# Patient Record
Sex: MALE | Race: WHITE | Employment: OTHER | URBAN - METROPOLITAN AREA
[De-identification: names, ages, dates, MRNs, and addresses within clinical notes are randomized per-mention and may not be internally consistent; named-entity substitution may affect disease eponyms.]

---

## 2017-03-04 ENCOUNTER — HOSPITAL ENCOUNTER (INPATIENT)
Age: 72
LOS: 2 days | Discharge: HOME | DRG: 282 | End: 2017-03-06
Attending: EMERGENCY MEDICINE | Admitting: INTERNAL MEDICINE
Payer: MEDICARE

## 2017-03-04 ENCOUNTER — APPOINTMENT (OUTPATIENT)
Dept: GENERAL RADIOLOGY | Age: 72
DRG: 282 | End: 2017-03-04
Attending: EMERGENCY MEDICINE
Payer: MEDICARE

## 2017-03-04 DIAGNOSIS — I21.4 NSTEMI (NON-ST ELEVATED MYOCARDIAL INFARCTION) (HCC): Primary | ICD-10-CM

## 2017-03-04 PROBLEM — I10 HTN (HYPERTENSION): Status: ACTIVE | Noted: 2017-03-04

## 2017-03-04 PROBLEM — I25.10 CAD (CORONARY ARTERY DISEASE): Status: ACTIVE | Noted: 2017-03-04

## 2017-03-04 PROBLEM — I10 HTN (HYPERTENSION): Chronic | Status: ACTIVE | Noted: 2017-03-04

## 2017-03-04 PROBLEM — I25.10 CAD (CORONARY ARTERY DISEASE): Chronic | Status: ACTIVE | Noted: 2017-03-04

## 2017-03-04 PROBLEM — I48.91 ATRIAL FIBRILLATION (HCC): Chronic | Status: ACTIVE | Noted: 2017-03-04

## 2017-03-04 LAB
ANION GAP BLD CALC-SCNC: 14 MMOL/L (ref 5–15)
BASOPHILS # BLD AUTO: 0 K/UL (ref 0–0.1)
BASOPHILS # BLD: 0 % (ref 0–1)
BUN SERPL-MCNC: 21 MG/DL (ref 6–20)
BUN/CREAT SERPL: 19 (ref 12–20)
CALCIUM SERPL-MCNC: 9.6 MG/DL (ref 8.5–10.1)
CHLORIDE SERPL-SCNC: 105 MMOL/L (ref 97–108)
CO2 SERPL-SCNC: 21 MMOL/L (ref 21–32)
CREAT SERPL-MCNC: 1.12 MG/DL (ref 0.7–1.3)
DIFFERENTIAL METHOD BLD: ABNORMAL
EOSINOPHIL # BLD: 0.3 K/UL (ref 0–0.4)
EOSINOPHIL NFR BLD: 3 % (ref 0–7)
ERYTHROCYTE [DISTWIDTH] IN BLOOD BY AUTOMATED COUNT: 13.3 % (ref 11.5–14.5)
GLUCOSE SERPL-MCNC: 95 MG/DL (ref 65–100)
HCT VFR BLD AUTO: 43.3 % (ref 36.6–50.3)
HGB BLD-MCNC: 14.5 G/DL (ref 12.1–17)
LYMPHOCYTES # BLD AUTO: 52 % (ref 12–49)
LYMPHOCYTES # BLD: 5.8 K/UL
MCH RBC QN AUTO: 31.3 PG (ref 26–34)
MCHC RBC AUTO-ENTMCNC: 33.5 G/DL (ref 30–36.5)
MCV RBC AUTO: 93.3 FL (ref 80–99)
MONOCYTES # BLD: 1.2 K/UL (ref 0–1)
MONOCYTES NFR BLD AUTO: 11 % (ref 5–13)
NEUTS SEG # BLD: 3.8 K/UL (ref 1.8–8)
NEUTS SEG NFR BLD AUTO: 34 % (ref 32–75)
PLATELET # BLD AUTO: 215 K/UL (ref 150–400)
POTASSIUM SERPL-SCNC: 3.5 MMOL/L (ref 3.5–5.1)
RBC # BLD AUTO: 4.64 M/UL (ref 4.1–5.7)
SODIUM SERPL-SCNC: 140 MMOL/L (ref 136–145)
TROPONIN I SERPL-MCNC: 0.04 NG/ML
TROPONIN I SERPL-MCNC: 0.18 NG/ML
WBC # BLD AUTO: 11.2 K/UL (ref 4.1–11.1)

## 2017-03-04 PROCEDURE — 82550 ASSAY OF CK (CPK): CPT | Performed by: INTERNAL MEDICINE

## 2017-03-04 PROCEDURE — 74011250636 HC RX REV CODE- 250/636: Performed by: EMERGENCY MEDICINE

## 2017-03-04 PROCEDURE — 71020 XR CHEST PA LAT: CPT

## 2017-03-04 PROCEDURE — 74011250637 HC RX REV CODE- 250/637: Performed by: INTERNAL MEDICINE

## 2017-03-04 PROCEDURE — B246ZZZ ULTRASONOGRAPHY OF RIGHT AND LEFT HEART: ICD-10-PCS | Performed by: INTERNAL MEDICINE

## 2017-03-04 PROCEDURE — 84484 ASSAY OF TROPONIN QUANT: CPT | Performed by: EMERGENCY MEDICINE

## 2017-03-04 PROCEDURE — 36415 COLL VENOUS BLD VENIPUNCTURE: CPT | Performed by: INTERNAL MEDICINE

## 2017-03-04 PROCEDURE — 85025 COMPLETE CBC W/AUTO DIFF WBC: CPT | Performed by: EMERGENCY MEDICINE

## 2017-03-04 PROCEDURE — 74011250636 HC RX REV CODE- 250/636: Performed by: INTERNAL MEDICINE

## 2017-03-04 PROCEDURE — 96374 THER/PROPH/DIAG INJ IV PUSH: CPT

## 2017-03-04 PROCEDURE — 93005 ELECTROCARDIOGRAM TRACING: CPT

## 2017-03-04 PROCEDURE — 65660000000 HC RM CCU STEPDOWN

## 2017-03-04 PROCEDURE — 99285 EMERGENCY DEPT VISIT HI MDM: CPT

## 2017-03-04 PROCEDURE — 80048 BASIC METABOLIC PNL TOTAL CA: CPT | Performed by: EMERGENCY MEDICINE

## 2017-03-04 PROCEDURE — 74011250637 HC RX REV CODE- 250/637: Performed by: EMERGENCY MEDICINE

## 2017-03-04 RX ORDER — METOPROLOL SUCCINATE 25 MG/1
25 TABLET, EXTENDED RELEASE ORAL 2 TIMES DAILY
Status: ON HOLD | COMMUNITY
End: 2017-03-04

## 2017-03-04 RX ORDER — BENZONATATE 100 MG/1
100 CAPSULE ORAL
Status: DISCONTINUED | OUTPATIENT
Start: 2017-03-04 | End: 2017-03-06 | Stop reason: HOSPADM

## 2017-03-04 RX ORDER — DICYCLOMINE HYDROCHLORIDE 20 MG/1
20 TABLET ORAL
COMMUNITY

## 2017-03-04 RX ORDER — PANTOPRAZOLE SODIUM 40 MG/1
40 TABLET, DELAYED RELEASE ORAL
Status: DISCONTINUED | OUTPATIENT
Start: 2017-03-05 | End: 2017-03-06 | Stop reason: HOSPADM

## 2017-03-04 RX ORDER — OXYCODONE AND ACETAMINOPHEN 5; 325 MG/1; MG/1
1 TABLET ORAL
Status: DISCONTINUED | OUTPATIENT
Start: 2017-03-04 | End: 2017-03-06 | Stop reason: HOSPADM

## 2017-03-04 RX ORDER — METOPROLOL TARTRATE 25 MG/1
25 TABLET, FILM COATED ORAL 2 TIMES DAILY
Status: DISCONTINUED | OUTPATIENT
Start: 2017-03-05 | End: 2017-03-06 | Stop reason: HOSPADM

## 2017-03-04 RX ORDER — ALPRAZOLAM 0.5 MG/1
0.5 TABLET ORAL
Status: DISCONTINUED | OUTPATIENT
Start: 2017-03-04 | End: 2017-03-06 | Stop reason: HOSPADM

## 2017-03-04 RX ORDER — ALPRAZOLAM 0.25 MG/1
.25-.5 TABLET ORAL
Status: DISCONTINUED | OUTPATIENT
Start: 2017-03-04 | End: 2017-03-04

## 2017-03-04 RX ORDER — ALPRAZOLAM 0.5 MG/1
.25-.5 TABLET ORAL
COMMUNITY
End: 2019-02-25

## 2017-03-04 RX ORDER — BENZONATATE 100 MG/1
100 CAPSULE ORAL
COMMUNITY
End: 2019-02-25

## 2017-03-04 RX ORDER — HYDROMORPHONE HYDROCHLORIDE 1 MG/ML
0.5 INJECTION, SOLUTION INTRAMUSCULAR; INTRAVENOUS; SUBCUTANEOUS
Status: DISCONTINUED | OUTPATIENT
Start: 2017-03-04 | End: 2017-03-06 | Stop reason: HOSPADM

## 2017-03-04 RX ORDER — OXYMETAZOLINE HCL 0.05 %
2 SPRAY, NON-AEROSOL (ML) NASAL
COMMUNITY
End: 2019-02-25

## 2017-03-04 RX ORDER — ACETAMINOPHEN 325 MG/1
650 TABLET ORAL
Status: DISCONTINUED | OUTPATIENT
Start: 2017-03-04 | End: 2017-03-06 | Stop reason: HOSPADM

## 2017-03-04 RX ORDER — ZOLPIDEM TARTRATE 5 MG/1
5 TABLET ORAL
Status: DISCONTINUED | OUTPATIENT
Start: 2017-03-04 | End: 2017-03-06 | Stop reason: HOSPADM

## 2017-03-04 RX ORDER — SODIUM CHLORIDE 0.9 % (FLUSH) 0.9 %
5-10 SYRINGE (ML) INJECTION EVERY 8 HOURS
Status: DISCONTINUED | OUTPATIENT
Start: 2017-03-04 | End: 2017-03-06 | Stop reason: HOSPADM

## 2017-03-04 RX ORDER — DILTIAZEM HYDROCHLORIDE 240 MG/1
240 CAPSULE, COATED, EXTENDED RELEASE ORAL DAILY
COMMUNITY
End: 2019-03-12

## 2017-03-04 RX ORDER — GUAIFENESIN 100 MG/5ML
324 LIQUID (ML) ORAL
Status: COMPLETED | OUTPATIENT
Start: 2017-03-04 | End: 2017-03-04

## 2017-03-04 RX ORDER — AZITHROMYCIN 250 MG/1
250 TABLET, FILM COATED ORAL DAILY
COMMUNITY
Start: 2017-02-28 | End: 2017-03-06

## 2017-03-04 RX ORDER — ENOXAPARIN SODIUM 100 MG/ML
1 INJECTION SUBCUTANEOUS EVERY 12 HOURS
Status: DISCONTINUED | OUTPATIENT
Start: 2017-03-05 | End: 2017-03-06 | Stop reason: HOSPADM

## 2017-03-04 RX ORDER — DILTIAZEM HYDROCHLORIDE 240 MG/1
240 CAPSULE, COATED, EXTENDED RELEASE ORAL
Status: DISCONTINUED | OUTPATIENT
Start: 2017-03-05 | End: 2017-03-06

## 2017-03-04 RX ORDER — DICYCLOMINE HYDROCHLORIDE 20 MG/1
20 TABLET ORAL
Status: DISCONTINUED | OUTPATIENT
Start: 2017-03-04 | End: 2017-03-06 | Stop reason: HOSPADM

## 2017-03-04 RX ORDER — PANTOPRAZOLE SODIUM 40 MG/1
40 TABLET, DELAYED RELEASE ORAL
COMMUNITY
End: 2019-03-09

## 2017-03-04 RX ORDER — SODIUM CHLORIDE 0.9 % (FLUSH) 0.9 %
5-10 SYRINGE (ML) INJECTION AS NEEDED
Status: DISCONTINUED | OUTPATIENT
Start: 2017-03-04 | End: 2017-03-06 | Stop reason: HOSPADM

## 2017-03-04 RX ORDER — MORPHINE SULFATE 4 MG/ML
4 INJECTION, SOLUTION INTRAMUSCULAR; INTRAVENOUS ONCE
Status: COMPLETED | OUTPATIENT
Start: 2017-03-04 | End: 2017-03-04

## 2017-03-04 RX ORDER — ALPRAZOLAM 0.25 MG/1
0.25 TABLET ORAL
Status: DISCONTINUED | OUTPATIENT
Start: 2017-03-04 | End: 2017-03-06 | Stop reason: HOSPADM

## 2017-03-04 RX ORDER — ENOXAPARIN SODIUM 100 MG/ML
1 INJECTION SUBCUTANEOUS
Status: COMPLETED | OUTPATIENT
Start: 2017-03-04 | End: 2017-03-04

## 2017-03-04 RX ORDER — LISINOPRIL 5 MG/1
10 TABLET ORAL
Status: DISCONTINUED | OUTPATIENT
Start: 2017-03-05 | End: 2017-03-06 | Stop reason: HOSPADM

## 2017-03-04 RX ORDER — METOPROLOL TARTRATE 25 MG/1
50 TABLET, FILM COATED ORAL 2 TIMES DAILY
COMMUNITY
End: 2019-03-09

## 2017-03-04 RX ORDER — SODIUM CHLORIDE 9 MG/ML
75 INJECTION, SOLUTION INTRAVENOUS CONTINUOUS
Status: DISCONTINUED | OUTPATIENT
Start: 2017-03-04 | End: 2017-03-05

## 2017-03-04 RX ORDER — LISINOPRIL 10 MG/1
10 TABLET ORAL
COMMUNITY
End: 2019-03-12

## 2017-03-04 RX ADMIN — ASPIRIN 81 MG CHEWABLE TABLET 324 MG: 81 TABLET CHEWABLE at 16:33

## 2017-03-04 RX ADMIN — DILTIAZEM HYDROCHLORIDE 240 MG: 240 CAPSULE, EXTENDED RELEASE ORAL at 23:48

## 2017-03-04 RX ADMIN — LISINOPRIL 10 MG: 5 TABLET ORAL at 23:49

## 2017-03-04 RX ADMIN — Medication 10 ML: at 22:32

## 2017-03-04 RX ADMIN — SODIUM CHLORIDE 75 ML/HR: 900 INJECTION, SOLUTION INTRAVENOUS at 22:31

## 2017-03-04 RX ADMIN — ENOXAPARIN SODIUM 80 MG: 80 INJECTION SUBCUTANEOUS at 19:49

## 2017-03-04 RX ADMIN — Medication 4 MG: at 16:33

## 2017-03-04 RX ADMIN — ALPRAZOLAM 0.5 MG: 0.5 TABLET ORAL at 23:48

## 2017-03-04 NOTE — IP AVS SNAPSHOT
Karsten Moore 
 
 
 [address] [facility] 
[phone] Patient: Viktor Tineo MRN: [id number] :1945 You are allergic to the following No active allergies Immunizations Administered for This Admission Name Date Influenza Vaccine (Quad) PF 3/6/2017 Recent Documentation Height Weight BMI Smoking Status 1.829 m 84.6 kg 25.3 kg/m2 Never Smoker Emergency Contacts Name Discharge Info Relation Home Work Mobile Chantal Christianson  Spouse [3] [phone] About your hospitalization You were admitted on:  2017 You last received care in the:  [facility] 3 INTENSIVE CARE You were discharged on:  2017 Unit phone number:  [phone] Why you were hospitalized Your primary diagnosis was:  Nstemi (Non-St Elevated Myocardial Infarction) (Hcc) Your diagnoses also included:  Htn (Hypertension), Cad (Coronary Artery Disease), Atrial Fibrillation (Hcc) Providers Seen During Your Hospitalizations Provider Role Specialty Primary office phone Alexandria Curry. Evalina Castleman, MD Attending Provider Emergency Medicine [phone] Jose L Adan MD Attending Provider Internal Medicine [phone] Nick Barber MD Attending Provider Internal Medicine [phone] Your Primary Care Physician (PCP) Primary Care Physician Office Phone Office Fax Belkis Blackburn [phone] Follow-up Information Follow up With Details Comments Contact Info Bon Bose MD Schedule an appointment as soon as possible for a visit in 1 week  [address] [place] [address] [address] 
[phone] Tito Chow MD   [address] [facility] [address] [facility] 
[phone] Current Discharge Medication List  
  
START taking these medications Dose & Instructions Dispensing Information Comments Morning Noon Evening Bedtime  
 aspirin 81 mg chewable tablet Your next dose is: Today, Tomorrow Other:  _________ Dose:  81 mg Take 1 Tab by mouth daily. Quantity:  30 Tab Refills:  0  
     
   
   
   
  
 clopidogrel 75 mg Tab Commonly known as:  PLAVIX Start taking on:  3/7/2017 Your next dose is: Today, Tomorrow Other:  _________ Dose:  75 mg Take 1 Tab by mouth daily. Quantity:  30 Tab Refills:  0 CONTINUE these medications which have NOT CHANGED Dose & Instructions Dispensing Information Comments Morning Noon Evening Bedtime AFRIN (OXYMETAZOLINE) 0.05 % nasal spray Generic drug:  oxymetazoline Your next dose is: Today, Tomorrow Other:  _________ Dose:  2 Spray 2 Sprays by Both Nostrils route daily as needed for Congestion. Refills:  0 ALPRAZolam 0.5 mg tablet Commonly known as:  Ace Sa Your next dose is: Today, Tomorrow Other:  _________ Dose:  0.25-0.5 mg Take 0.25-0.5 mg by mouth two (2) times daily as needed for Anxiety (Patient reports anxiety attacks). Refills:  0 BENTYL 20 mg tablet Generic drug:  dicyclomine Your next dose is: Today, Tomorrow Other:  _________ Dose:  20 mg Take 20 mg by mouth two (2) times daily as needed. Refills:  0 CARDIZEM  mg ER capsule Generic drug:  dilTIAZem CD Your next dose is: Today, Tomorrow Other:  _________ Dose:  240 mg Take 240 mg by mouth nightly. Refills:  0  
     
   
   
   
  
 lisinopril 10 mg tablet Commonly known as:  Ora Bee Your next dose is: Today, Tomorrow Other:  _________ Dose:  10 mg Take 10 mg by mouth nightly. Refills:  0 metoprolol tartrate 25 mg tablet Commonly known as:  LOPRESSOR Your next dose is: Today, Tomorrow Other:  _________ Dose:  25 mg Take 25 mg by mouth two (2) times a day. Refills:  0  
     
   
   
   
  
 pantoprazole 40 mg tablet Commonly known as:  PROTONIX Your next dose is: Today, Tomorrow Other:  _________ Dose:  40 mg Take 40 mg by mouth Daily (before breakfast). Indications: GASTROESOPHAGEAL REFLUX Refills:  0  
     
   
   
   
  
 TESSALON PERLES 100 mg capsule Generic drug:  benzonatate Your next dose is: Today, Tomorrow Other:  _________ Dose:  100 mg Take 100 mg by mouth three (3) times daily as needed for Cough. Refills:  0 VICKS DAYQUIL COLD-FLU RELIEF 5- mg/15 mL Liqd Generic drug:  Phenylephrine-DM-Acetaminophen Your next dose is: Today, Tomorrow Other:  _________ Dose:  15 mL Take 15 mL by mouth daily as needed. Refills:  0 VICKS NYQUIL COUGH 6.25-15 mg/15 mL Soln Generic drug:  doxylamine-dm Your next dose is: Today, Tomorrow Other:  _________ Dose:  15 mL Take 15 mL by mouth nightly as needed. Refills:  0 STOP taking these medications   
 azithromycin 250 mg tablet Commonly known as:  Mary Grace Collins Where to Get Your Medications Information on where to get these meds will be given to you by the nurse or doctor. ! Ask your nurse or doctor about these medications  
  aspirin 81 mg chewable tablet  
 clopidogrel 75 mg Tab Discharge Instructions Jeanna Quigley Office: [address] 
    [phone] [facility] office: [address] [place] [address]  
                           [phone] Patient Discharge Instructions Trenton Wang / [id number] : 1945 Admitted 3/4/2017 Discharged: 3/6/2017 Cardiologist:  Dr. Vida Resendiz     PCP: Eleanor Hicks MD 
   
Procedures/Test:CATH: no coronary artery disease · It is important that you take the medication exactly as they are prescribed. · Keep your medication in the bottles provided by the pharmacist and keep a list of the medication names, dosages, and times to be taken in your wallet. · Do not take other medications without consulting your doctor. BRING ALL of YOUR MEDICINES TO YOUR OFFICE VISIT with Dr.  
 
Cardiac Catheterization  Discharge Instructions *Check the puncture site frequently for swelling or bleeding. If you see any bleeding, lie down and apply pressure over the area with a clean town or washcloth. Notify your doctor for any redness, swelling, drainage or oozing from the puncture site. Notify your doctor for any fever or chills. *If the leg or arm with the puncture becomes cold, numb or painful, call Dr Nydia Merino *Activity should be limited for the next 48 hours. Climb stairs as little as possible and avoid any stooping, bending or strenuous activity for 48 hours. No strenuous activity or heavy lifting over 10 lbs. for 7 days. · You may take a shower 24 hours after your cardiac catheterization. Be sure to get the dressing wet and then remove it; gently wash the area with warm soapy water. Pat dry and leave open to air. To help prevent infections, be sure to keep the cath site clean and dry. No lotions, creams, powders, ointments, etc. in the cath site for approximately 1 week. · Do not take a tub bath, get in a hot tub or swimming pool for approximately 5 days or until the cath site is completely healed. · Drink plenty of fluids for 24-48 hours after your cath to flush the contrast dye from your kidneys. No alcoholic beverages for 24 hours. You may resume your previous diet (low fat, low cholesterol) after your cath. · Do not drive or operate heavy machinery for 48 hours. · You may resume your usual diet. Drink more fluids than usual. 
· Have a responsible person drive you home and stay with you for at least 24 hours after your heart catheterization/angiography. · *After your cath, some bruising or discomfort is common during the healing process. Tylenol, 1-2 tablets every 6 hours as needed, is recommended if you experience any discomfort. If you experience any signs or symptoms of infection such as fever, chills, or poorly healing incision, persistent tenderness or swelling in the groin, redness and/or warmth to the touch, numbness, significant tingling or pain at the groin site or affected extremity, rash, drainage from the cath site, or if the leg feels tight or swollen, call your physician right away. ? If bleeding at the cath site occurs, take a clean gauze pad and apply direct pressure to the groin just above the puncture site. Call 911 immediately, and continue to apply direct pressure until an ambulance gets to your location. ? You may return to work  2  days after your cardiac cath if no groin bleeding. Activity: Resume normal activity letting fatigue be the guide. Do not lift over 10 pounds for 7 days. Diet: American Heart association, low fat, 1500 mg sodium  Diabetic. Call for or return to ER for recurrent or prolonged chest pain, shortness of breath, lightheadedness, dizzy, palpitations, passing out, swelling in the legs or abdomen, pain or swelling  At the cath site. Lifestyle changes Eat a heart-healthy diet that is low in cholesterol, saturated fat, and salt, and is full of fruits, vegetables and whole-grains. Eat at least two servings of fish each week.  You may get more details about how to eat healthy, but these tips can help you get started. Www.[facility].com When should you call for help? Call 911 anytime you think you may need emergency care. For example, call if: 
You have signs of a heart attack. These may include: 
Chest pain or pressure. Sweating. Shortness of breath. Nausea or vomiting. Pain that spreads from the chest to the neck, jaw, or one or both shoulders or arms. Dizziness or lightheadedness. A fast or irregular pulse. After calling 911, chew 1 adult-strength aspirin. Wait for an ambulance. Do not try to drive yourself. You passed out (lost consciousness). You feel like you are having another heart attack. Call your doctor now or seek immediate medical care if: 
You have had any chest pain, even if it has gone away. You have new or increased shortness of breath. You are dizzy or lightheaded, or you feel like you may faint. Watch closely for changes in your health, and be sure to contact your doctor if you have any problem Information obtained by : 
I understand that if any problems occur once I am at home I am to contact my physician. I understand and acknowledge receipt of the instructions indicated above. R.N.'s Signature                                                                  Date/Time Patient or Representative Signature                                                          Date/Time HOSPITALIST DISCHARGE INSTRUCTIONS 
NAME: Reggie Holter :  1945 MRN:  [id number] Date/Time:  3/6/2017 2:52 PM 
 
ADMIT DATE: 3/4/2017 DISCHARGE DATE: 3/6/2017 ADMITTING DIAGNOSIS: 
Elevated troponin Possibly small vessel disease v. Viral myocarditis DISCHARGE DIAGNOSIS: 
As above MEDICATIONS: 
  
· It is important that you take the medication exactly as they are prescribed. · Keep your medication in the bottles provided by the pharmacist and keep a list of the medication names, dosages, and times to be taken in your wallet. · Do not take other medications without consulting your doctor. Pain Management: per above medications What to do at [facility] Recommended diet:  Cardiac Diet Recommended activity: Activity as tolerated If you experience any of the following symptoms then please call your primary care physician or return to the emergency room if you cannot get hold of your doctor: 
Fever, chills, nausea, vomiting, diarrhea, change in mentation, falling, bleeding, shortness of breath, chest pain Follow Up: 
Dr. Inga Horne MD  you are to call and set up an appointment to see them in 2 weeks. Follow-up Information Follow up With Details Comments Contact Info Dawood Scott MD Schedule an appointment as soon as possible for a visit in 1 week  [address] [address] [place] [address] 
[phone] Inga Horne MD   [address] [facility] [address] [facility] 
[phone] Information obtained by : 
I understand that if any problems occur once I am at home I am to contact my physician. I understand and acknowledge receipt of the instructions indicated above. Physician's or R.N.'s Signature                                                                  Date/Time Patient or Representative Signature                                                          Date/Time Discharge Orders None [employer] Announcement We are excited to announce that we are making your provider's discharge notes available to you in [employer]. You will see these notes when they are completed and signed by the physician that discharged you from your recent hospital stay. If you have any questions or concerns about any information you see in [employer], please call the Health Information Department where you were seen or reach out to your Primary Care Provider for more information about your plan of care. Introducing \[facility] [place] [facility]\"" & HEALTH SERVICES! Carmen Ford introduces [employer] patient portal. Now you can access parts of your medical record, email your doctor's office, and request medication refills online. 1. In your internet browser, go to [email or web address] 2. Click on the First Time User? Click Here link in the Sign In box. You will see the New Member Sign Up page. 3. Enter your [employer] Access Code exactly as it appears below. You will not need to use this code after youve completed the sign-up process. If you do not sign up before the expiration date, you must request a new code. · [employer] Access Code: [email or web address] Expires: 6/2/2017  3:10 PM 
 
4. Enter the last four digits of your Social Security Number (xxxx) and Date of Birth (mm/dd/yyyy) as indicated and click Submit. You will be taken to the next sign-up page. 5. Create a [employer] ID. This will be your [employer] login ID and cannot be changed, so think of one that is secure and easy to remember. 6. Create a [employer] password. You can change your password at any time. 7. Enter your Password Reset Question and Answer. This can be used at a later time if you forget your password. 8. Enter your e-mail address. You will receive e-mail notification when new information is available in [address] 9. Click Sign Up. You can now view and download portions of your medical record.  
10. Click the Download Summary menu link to download a portable copy of your medical information. If you have questions, please visit the Frequently Asked Questions section of the [employer] website. Remember, MyChart is NOT to be used for urgent needs. For medical emergencies, dial 911. Now available from your iPhone and Android! General Information Please provide this summary of care documentation to your next provider. Patient Signature:  ____________________________________________________________ Date:  ____________________________________________________________  
  
Elenita Moulds Provider Signature:  ____________________________________________________________ Date:  ____________________________________________________________

## 2017-03-04 NOTE — ED PROVIDER NOTES
HPI Comments: 80 y.o. male with past medical history significant for HTN, CAD, A-fib who presents from home with chief complaint of chest pain. Describes chest pain as right sided and as a pressure. Denies SOB or nausea. Started while walking his dog. Has been sick recently with nasal congestion and a cough for 2 weeks. There are no other acute medical concerns at this time. Social hx:   Significant FMHx: none  PCP: Uzma Monet MD           Past Medical History:   Diagnosis Date    [facility]     Acid reflux     CAD (coronary artery disease)     Hypertension        Past Surgical History:   Procedure Laterality Date    HX AORTIC VALVE REPLACEMENT           History reviewed. No pertinent family history. Social History     Social History    Marital status:      Spouse name: N/A    Number of children: N/A    Years of education: N/A     Occupational History    Not on file. Social History Main Topics    Smoking status: Never Smoker    Smokeless tobacco: Never Used    Alcohol use 4.0 oz/week     8 Cans of beer per week      Comment: every other night    Drug use: No    Sexual activity: Not on file     Other Topics Concern    Not on file     Social History Narrative         ALLERGIES: Review of patient's allergies indicates no known allergies. Review of Systems   Constitutional: Negative for chills and fever. HENT: Positive for postnasal drip. Negative for ear pain and sore throat. Eyes: Negative for pain. Respiratory: Positive for cough. Negative for chest tightness and shortness of breath. Cardiovascular: Positive for chest pain. Negative for leg swelling. Gastrointestinal: Negative for abdominal pain, nausea and vomiting. Genitourinary: Negative for dysuria and flank pain. Musculoskeletal: Negative for back pain. Skin: Negative for rash. Neurological: Negative for headaches. All other systems reviewed and are negative.       Vitals:    03/04/17 1700 03/04/17 1720 03/04/17 1730 03/04/17 1740   BP: 132/80 143/90 141/78 128/81   Pulse: (!) 53 (!) 56  (!) 54   Resp: 18 11 17   Temp:       SpO2: 93% 96%  97%   Weight:       Height:                Physical Exam   Constitutional: He appears well-developed and well-nourished. No distress. HENT:   Head: Normocephalic and atraumatic. Eyes: Pupils are equal, round, and reactive to light. No scleral icterus. Neck: Normal range of motion. Neck supple. No tracheal deviation present. Cardiovascular: Normal rate, regular rhythm, normal heart sounds and intact distal pulses. Exam reveals no gallop and no friction rub. No murmur heard. Pulmonary/Chest: Effort normal and breath sounds normal. No respiratory distress. He has no wheezes. He has no rales. Abdominal: Soft. He exhibits no distension. There is no tenderness. There is no rebound and no guarding. Musculoskeletal: He exhibits no edema. Neurological: He is alert. Skin: Skin is warm and dry. Psychiatric: He has a normal mood and affect. Nursing note and vitals reviewed. MDM  ED Course       Procedures    The patient presents with chest pain with a differential diagnosis of  ACS, acute MI, chest wall pain and GERD    ED Course:  Pain greatly improved with ASA and morphine. 6:22 PM  Spoke with Dr. Mitchel Rubio.  Agreed with plan. Patient can follow up with Dr. Ashia Cabral on Monday in mid morning. EKG Per My Interpretation:  Rhythm: normal sinus rhythm;  Rate (approx.): 70; Axis: left axis deviation; QRS interval: prolonged; ST/T wave: non-specific changes; Other findings: left ventricular hypertrophy.      LABORATORY TESTS:  Recent Results (from the past 24 hour(s))   EKG, 12 LEAD, INITIAL    Collection Time: 03/04/17  3:13 PM   Result Value Ref Range    Ventricular Rate 70 BPM    Atrial Rate 70 BPM    P-R Interval 206 ms    QRS Duration 128 ms    Q-T Interval 446 ms    QTC Calculation (Bezet) 481 ms    Calculated P Axis 61 degrees    Calculated R Axis -52 degrees    Calculated T Axis 82 degrees    Diagnosis       Sinus rhythm with occasional and consecutive premature ventricular complexes   and fusion complexes  Left axis deviation  Left ventricular hypertrophy with QRS widening and repolarization abnormality  Abnormal ECG  No previous ECGs available     CBC WITH AUTOMATED DIFF    Collection Time: 03/04/17  3:25 PM   Result Value Ref Range    WBC 11.2 (H) 4.1 - 11.1 K/uL    RBC 4.64 4.10 - 5.70 M/uL    HGB 14.5 12.1 - 17.0 g/dL    HCT 43.3 36.6 - 50.3 %    MCV 93.3 80.0 - 99.0 FL    MCH 31.3 26.0 - 34.0 PG    MCHC 33.5 30.0 - 36.5 g/dL    RDW 13.3 11.5 - 14.5 %    PLATELET 440 596 - 178 K/uL    NEUTROPHILS 34 32 - 75 %    LYMPHOCYTES 52 (H) 12 - 49 %    MONOCYTES 11 5 - 13 %    EOSINOPHILS 3 0 - 7 %    BASOPHILS 0 0 - 1 %    ABS. NEUTROPHILS 3.8 1.8 - 8.0 K/UL    ABS. LYMPHOCYTES 5.8 K/UL    ABS. MONOCYTES 1.2 (H) 0.0 - 1.0 K/UL    ABS. EOSINOPHILS 0.3 0.0 - 0.4 K/UL    ABS. BASOPHILS 0.0 0.0 - 0.1 K/UL    DF AUTOMATED     TROPONIN I    Collection Time: 03/04/17  3:25 PM   Result Value Ref Range    Troponin-I, Qt. 0.04 <9.31 ng/mL   METABOLIC PANEL, BASIC    Collection Time: 03/04/17  3:25 PM   Result Value Ref Range    Sodium 140 136 - 145 mmol/L    Potassium 3.5 3.5 - 5.1 mmol/L    Chloride 105 97 - 108 mmol/L    CO2 21 21 - 32 mmol/L    Anion gap 14 5 - 15 mmol/L    Glucose 95 65 - 100 mg/dL    BUN 21 (H) 6 - 20 MG/DL    Creatinine 1.12 0.70 - 1.30 MG/DL    BUN/Creatinine ratio 19 12 - 20      GFR est AA >60 >60 ml/min/1.73m2    GFR est non-AA >60 >60 ml/min/1.73m2    Calcium 9.6 8.5 - 10.1 MG/DL       IMAGING RESULTS:  Xr Chest Pa Lat    Result Date: 3/4/2017  CLINICAL HISTORY: Chest pain , cough as well, congestion for 2 weeks INDICATION: Chest pain COMPARISON: None FINDINGS: PA and lateral views of the chest are obtained. The cardiopericardial silhouette is within normal limits. There is no pleural effusion, pneumothorax or focal consolidation present. Sternotomy. IMPRESSION: No acute intrathoracic disease.        MEDICATIONS GIVEN:  Medications   aspirin chewable tablet 324 mg (324 mg Oral Given 3/4/17 1633)   morphine injection 4 mg (4 mg IntraVENous Given 3/4/17 1633)       IMPRESSION:  1. NSTEMI (non-ST elevated myocardial infarction) ([facility]        PLAN:  -  Lovenox    Disposition:  Admit     Condition: stable    Total critical care time spend exclusive of procedures:  33 minutes    Perfecto Mensah MD

## 2017-03-04 NOTE — IP AVS SNAPSHOT
Current Discharge Medication List  
  
Take these medications at their scheduled times Dose & Instructions Dispensing Information Comments Morning Noon Evening Bedtime  
 aspirin 81 mg chewable tablet Your next dose is: Today, Tomorrow Other:  ____________ Dose:  81 mg Take 1 Tab by mouth daily. Quantity:  30 Tab Refills:  0 CARDIZEM  mg ER capsule Generic drug:  dilTIAZem CD Your next dose is: Today, Tomorrow Other:  ____________ Dose:  240 mg Take 240 mg by mouth nightly. Refills:  0  
     
   
   
   
  
 clopidogrel 75 mg Tab Commonly known as:  PLAVIX Start taking on:  3/7/2017 Your next dose is: Today, Tomorrow Other:  ____________ Dose:  75 mg Take 1 Tab by mouth daily. Quantity:  30 Tab Refills:  0  
     
   
   
   
  
 lisinopril 10 mg tablet Commonly known as:  Magda Loss Your next dose is: Today, Tomorrow Other:  ____________ Dose:  10 mg Take 10 mg by mouth nightly. Refills:  0  
     
   
   
   
  
 metoprolol tartrate 25 mg tablet Commonly known as:  LOPRESSOR Your next dose is: Today, Tomorrow Other:  ____________ Dose:  25 mg Take 25 mg by mouth two (2) times a day. Refills:  0  
     
   
   
   
  
 pantoprazole 40 mg tablet Commonly known as:  PROTONIX Your next dose is: Today, Tomorrow Other:  ____________ Dose:  40 mg Take 40 mg by mouth Daily (before breakfast). Indications: GASTROESOPHAGEAL REFLUX Refills:  0 Take these medications as needed Dose & Instructions Dispensing Information Comments Morning Noon Evening Bedtime AFRIN (OXYMETAZOLINE) 0.05 % nasal spray Generic drug:  oxymetazoline Your next dose is: Today, Tomorrow Other:  ____________ Dose:  2 Spray 2 Sprays by Both Nostrils route daily as needed for Congestion. Refills:  0 ALPRAZolam 0.5 mg tablet Commonly known as:  Pam [place]in Your next dose is: Today, Tomorrow Other:  ____________ Dose:  0.25-0.5 mg Take 0.25-0.5 mg by mouth two (2) times daily as needed for Anxiety (Patient reports anxiety attacks). Refills:  0 BENTYL 20 mg tablet Generic drug:  dicyclomine Your next dose is: Today, Tomorrow Other:  ____________ Dose:  20 mg Take 20 mg by mouth two (2) times daily as needed. Refills:  0  
     
   
   
   
  
 TESSALON PERLES 100 mg capsule Generic drug:  benzonatate Your next dose is: Today, Tomorrow Other:  ____________ Dose:  100 mg Take 100 mg by mouth three (3) times daily as needed for Cough. Refills:  0 VICKS DAYQUIL COLD-FLU RELIEF 5- mg/15 mL Liqd Generic drug:  Phenylephrine-DM-Acetaminophen Your next dose is: Today, Tomorrow Other:  ____________ Dose:  15 mL Take 15 mL by mouth daily as needed. Refills:  0 VICKS NYQUIL COUGH 6.25-15 mg/15 mL Soln Generic drug:  doxylamine-dm Your next dose is: Today, Tomorrow Other:  ____________ Dose:  15 mL Take 15 mL by mouth nightly as needed. Refills:  0 Where to Get Your Medications Information about where to get these medications is not yet available ! Ask your nurse or doctor about these medications  
  aspirin 81 mg chewable tablet  
 clopidogrel 75 mg Tab

## 2017-03-04 NOTE — ED NOTES
Based on TRST score of 1, functional assessment discussed in collaboration with Provider  Dr Kindra Ibrahim , Family member/representative  spouse , and/or Patient and no additional referrals needed at this time.

## 2017-03-04 NOTE — ED TRIAGE NOTES
Pt assisted to treatment area via wheelchair he states that about 20 minutes ago while walking the dog he began with a pressure to his mid chest but he notes more to the right mid chest.  The pressure continues at this time, pt denies any N/V, SOB or diaphoresis with the pressure. Pt notes that for the past week he has been dealing with a chest cold with a  productive cough of yellowish mucous. He just finished a Zpack for this chest cold, and he is not feeling much better.

## 2017-03-05 LAB
ANION GAP BLD CALC-SCNC: 9 MMOL/L (ref 5–15)
ATRIAL RATE: 70 BPM
BUN SERPL-MCNC: 17 MG/DL (ref 6–20)
BUN/CREAT SERPL: 21 (ref 12–20)
CALCIUM SERPL-MCNC: 8.5 MG/DL (ref 8.5–10.1)
CALCULATED P AXIS, ECG09: 61 DEGREES
CALCULATED R AXIS, ECG10: -52 DEGREES
CALCULATED T AXIS, ECG11: 82 DEGREES
CHLORIDE SERPL-SCNC: 109 MMOL/L (ref 97–108)
CHOLEST SERPL-MCNC: 153 MG/DL
CK SERPL-CCNC: 232 U/L (ref 39–308)
CK SERPL-CCNC: 421 U/L (ref 39–308)
CK SERPL-CCNC: 439 U/L (ref 39–308)
CO2 SERPL-SCNC: 24 MMOL/L (ref 21–32)
CREAT SERPL-MCNC: 0.82 MG/DL (ref 0.7–1.3)
DIAGNOSIS, 93000: NORMAL
ERYTHROCYTE [DISTWIDTH] IN BLOOD BY AUTOMATED COUNT: 13.5 % (ref 11.5–14.5)
GLUCOSE SERPL-MCNC: 98 MG/DL (ref 65–100)
HCT VFR BLD AUTO: 41 % (ref 36.6–50.3)
HDLC SERPL-MCNC: 64 MG/DL
HDLC SERPL: 2.4 {RATIO} (ref 0–5)
HGB BLD-MCNC: 13 G/DL (ref 12.1–17)
LDLC SERPL CALC-MCNC: 75.8 MG/DL (ref 0–100)
LIPID PROFILE,FLP: NORMAL
MAGNESIUM SERPL-MCNC: 2 MG/DL (ref 1.6–2.4)
MCH RBC QN AUTO: 30.2 PG (ref 26–34)
MCHC RBC AUTO-ENTMCNC: 31.7 G/DL (ref 30–36.5)
MCV RBC AUTO: 95.1 FL (ref 80–99)
P-R INTERVAL, ECG05: 206 MS
PHOSPHATE SERPL-MCNC: 4 MG/DL (ref 2.6–4.7)
PLATELET # BLD AUTO: 174 K/UL (ref 150–400)
POTASSIUM SERPL-SCNC: 4.5 MMOL/L (ref 3.5–5.1)
Q-T INTERVAL, ECG07: 446 MS
QRS DURATION, ECG06: 128 MS
QTC CALCULATION (BEZET), ECG08: 481 MS
RBC # BLD AUTO: 4.31 M/UL (ref 4.1–5.7)
SODIUM SERPL-SCNC: 142 MMOL/L (ref 136–145)
TRIGL SERPL-MCNC: 66 MG/DL (ref ?–150)
TROPONIN I SERPL-MCNC: 15.13 NG/ML
TROPONIN I SERPL-MCNC: 4.07 NG/ML
TROPONIN I SERPL-MCNC: 9.64 NG/ML
VENTRICULAR RATE, ECG03: 70 BPM
VLDLC SERPL CALC-MCNC: 13.2 MG/DL
WBC # BLD AUTO: 6.4 K/UL (ref 4.1–11.1)

## 2017-03-05 PROCEDURE — 83735 ASSAY OF MAGNESIUM: CPT | Performed by: INTERNAL MEDICINE

## 2017-03-05 PROCEDURE — 65660000000 HC RM CCU STEPDOWN

## 2017-03-05 PROCEDURE — 74011250636 HC RX REV CODE- 250/636: Performed by: INTERNAL MEDICINE

## 2017-03-05 PROCEDURE — 93306 TTE W/DOPPLER COMPLETE: CPT

## 2017-03-05 PROCEDURE — 85027 COMPLETE CBC AUTOMATED: CPT | Performed by: INTERNAL MEDICINE

## 2017-03-05 PROCEDURE — 82550 ASSAY OF CK (CPK): CPT | Performed by: INTERNAL MEDICINE

## 2017-03-05 PROCEDURE — 80061 LIPID PANEL: CPT | Performed by: INTERNAL MEDICINE

## 2017-03-05 PROCEDURE — 77030033269 HC SLV COMPR SCD KNE2 CARD -B

## 2017-03-05 PROCEDURE — 84484 ASSAY OF TROPONIN QUANT: CPT | Performed by: INTERNAL MEDICINE

## 2017-03-05 PROCEDURE — 80048 BASIC METABOLIC PNL TOTAL CA: CPT | Performed by: INTERNAL MEDICINE

## 2017-03-05 PROCEDURE — 84100 ASSAY OF PHOSPHORUS: CPT | Performed by: INTERNAL MEDICINE

## 2017-03-05 PROCEDURE — 74011250637 HC RX REV CODE- 250/637: Performed by: INTERNAL MEDICINE

## 2017-03-05 PROCEDURE — 36415 COLL VENOUS BLD VENIPUNCTURE: CPT | Performed by: INTERNAL MEDICINE

## 2017-03-05 RX ORDER — FLUTICASONE PROPIONATE 50 MCG
2 SPRAY, SUSPENSION (ML) NASAL DAILY
Status: DISCONTINUED | OUTPATIENT
Start: 2017-03-05 | End: 2017-03-06 | Stop reason: HOSPADM

## 2017-03-05 RX ORDER — GUAIFENESIN 100 MG/5ML
81 LIQUID (ML) ORAL DAILY
Status: DISCONTINUED | OUTPATIENT
Start: 2017-03-05 | End: 2017-03-06 | Stop reason: HOSPADM

## 2017-03-05 RX ORDER — GUAIFENESIN 100 MG/5ML
81 LIQUID (ML) ORAL
Status: DISCONTINUED | OUTPATIENT
Start: 2017-03-05 | End: 2017-03-05 | Stop reason: ALTCHOICE

## 2017-03-05 RX ORDER — SODIUM CHLORIDE 9 MG/ML
75 INJECTION, SOLUTION INTRAVENOUS CONTINUOUS
Status: DISCONTINUED | OUTPATIENT
Start: 2017-03-06 | End: 2017-03-06 | Stop reason: HOSPADM

## 2017-03-05 RX ADMIN — PANTOPRAZOLE SODIUM 40 MG: 40 TABLET, DELAYED RELEASE ORAL at 08:44

## 2017-03-05 RX ADMIN — ENOXAPARIN SODIUM 80 MG: 80 INJECTION SUBCUTANEOUS at 21:29

## 2017-03-05 RX ADMIN — Medication 10 ML: at 06:15

## 2017-03-05 RX ADMIN — DILTIAZEM HYDROCHLORIDE 240 MG: 240 CAPSULE, EXTENDED RELEASE ORAL at 21:33

## 2017-03-05 RX ADMIN — METOPROLOL TARTRATE 25 MG: 25 TABLET ORAL at 21:33

## 2017-03-05 RX ADMIN — FLUTICASONE PROPIONATE 2 SPRAY: 50 SPRAY, METERED NASAL at 13:30

## 2017-03-05 RX ADMIN — SODIUM CHLORIDE 50 ML/HR: 900 INJECTION, SOLUTION INTRAVENOUS at 23:36

## 2017-03-05 RX ADMIN — METOPROLOL TARTRATE 25 MG: 25 TABLET ORAL at 08:44

## 2017-03-05 RX ADMIN — ENOXAPARIN SODIUM 80 MG: 80 INJECTION SUBCUTANEOUS at 08:44

## 2017-03-05 RX ADMIN — ASPIRIN 81 MG CHEWABLE TABLET 81 MG: 81 TABLET CHEWABLE at 08:44

## 2017-03-05 RX ADMIN — Medication 10 ML: at 21:34

## 2017-03-05 RX ADMIN — LISINOPRIL 10 MG: 5 TABLET ORAL at 21:33

## 2017-03-05 RX ADMIN — ALPRAZOLAM 0.5 MG: 0.5 TABLET ORAL at 21:33

## 2017-03-05 NOTE — ED NOTES
Bedside verbal report given to [facility] with [facility]. Time out completed.   Verification of hospital location [facility] and room IVCU 3007 completed with EMS provider

## 2017-03-05 NOTE — PROGRESS NOTES
[facility] [address], [place], [address] [facility]  [phone]      Medical Progress Note      NAME: Yusuf Orr   :  1945  MRM:  [id number]    Date/Time: 3/5/2017  1:13 PM         Subjective:     Chief Complaint: \"I'm okay\"   Pt without complaints of chest pain or SOB. Trop has peaked at 13. Plan for cath it the AM     ROS:  (bold if positive, if negative)      Tolerating Diet          Objective:       Vitals:          Last 24hrs VS reviewed since prior progress note.  Most recent are:    Visit Vitals    /78    Pulse (!) 55    Temp 97.8 °F (36.6 °C)    Resp 13    Ht 6' (1.829 m)    Wt 84.6 kg (186 lb 8.2 oz)    SpO2 100%    BMI 25.3 kg/m2     SpO2 Readings from Last 6 Encounters:   17 100%   14 98%          Intake/Output Summary (Last 24 hours) at 17 1313  Last data filed at 17 1000   Gross per 24 hour   Intake          1101.25 ml   Output             1820 ml   Net          -718.75 ml          Exam:     Physical Exam:    Gen:  Well-developed, well-nourished, in no acute distress  HEENT:  Pink conjunctivae, PERRL, hearing intact to voice, moist mucous membranes  Neck:  Supple, without masses, thyroid non-tender  Resp:  No accessory muscle use, clear breath sounds without wheezes rales or rhonchi  Card:  No murmurs, normal S1, S2 without thrills, bruits or peripheral edema  Abd:  Soft, non-tender, non-distended, normoactive bowel sounds are present  Musc:  No cyanosis or clubbing  Skin:  No rashes or ulcers, skin turgor is good  Neuro:  Cranial nerves 3-12 are grossly intact,  strength is 5/5 bilaterally and dorsi / plantarflexion is 5/5 bilaterally, follows commands appropriately  Psych:  Good insight, oriented to person, place and time, alert    Medications Reviewed: (see below)    Lab Data Reviewed: (see below)    ______________________________________________________________________    Medications:     Current Facility-Administered Medications   Medication Dose Route Frequency    aspirin chewable tablet 81 mg  81 mg Oral DAILY    fluticasone (FLONASE) 50 mcg/actuation nasal spray 2 Spray  2 Spray Both Nostrils DAILY    [START ON 3/6/2017] 0.9% sodium chloride infusion  50 mL/hr IntraVENous CONTINUOUS    enoxaparin (LOVENOX) injection 80 mg  1 mg/kg SubCUTAneous Q12H    sodium chloride (NS) flush 5-10 mL  5-10 mL IntraVENous Q8H    sodium chloride (NS) flush 5-10 mL  5-10 mL IntraVENous PRN    acetaminophen (TYLENOL) tablet 650 mg  650 mg Oral Q4H PRN    oxyCODONE-acetaminophen (PERCOCET) 5-325 mg per tablet 1 Tab  1 Tab Oral Q4H PRN    HYDROmorphone (PF) (DILAUDID) injection 0.5 mg  0.5 mg IntraVENous Q4H PRN    zolpidem (AMBIEN) tablet 5 mg  5 mg Oral QHS PRN    prochlorperazine (COMPAZINE) with saline injection 5 mg  5 mg IntraVENous Q6H PRN    influenza vaccine 2016-17 (36mos+)(PF) (FLUZONE/FLUARIX/FLULAVAL QUAD) injection 0.5 mL  0.5 mL IntraMUSCular PRIOR TO DISCHARGE    benzonatate (TESSALON) capsule 100 mg  100 mg Oral TID PRN    dicyclomine (BENTYL) tablet 20 mg  20 mg Oral BID PRN    dilTIAZem CD (CARDIZEM CD) capsule 240 mg  240 mg Oral QHS    lisinopril (PRINIVIL, ZESTRIL) tablet 10 mg  10 mg Oral QHS    metoprolol tartrate (LOPRESSOR) tablet 25 mg  25 mg Oral BID    pantoprazole (PROTONIX) tablet 40 mg  40 mg Oral ACB    ALPRAZolam (XANAX) tablet 0.25 mg  0.25 mg Oral BID PRN    Or    ALPRAZolam (XANAX) tablet 0.5 mg  0.5 mg Oral BID PRN            Lab Review:     Recent Labs      03/05/17 0527 03/04/17   1525   WBC  6.4  11.2*   HGB  13.0  14.5   HCT  41.0  43.3   PLT  174  215     Recent Labs      03/05/17 0527  03/04/17   1525   NA  142  140   K  4.5  3.5   CL  109*  105   CO2  24  21   GLU  98  95   BUN  17  21*   CREA  0.82  1.12   CA  8.5  9.6   MG  2.0   --    PHOS  4.0   --      No components found for: [place]         Assessment / Plan:   NSTEMI (non-ST elevated myocardial infarction) ([facility] (3/4/2017)  -continue ASA   -morphine PRN   -continue O2  -nitrates PRN   -continue therapeutic lovenox   -LDL roughly at goal; 75  -plan for cath in the AM; NPO at midnight       HTN (hypertension) (3/4/2017)  -continue metoprolol, dilt, ACE I       CAD (coronary artery disease) (3/4/2017)  -continue ASA, beta blocker   -not on statin; defer to cardiology re: statin as LDL 75      Atrial fibrillation ([facility] (3/4/2017)   -continue dilt and metoprolol  -not on NOAC; defer to cardiology about this as CHADS2 score =>2     Total time spent with patient: 28 Minutes                  Care Plan discussed with: Patient and Family    Discussed:  Code Status, Care Plan and D/C Planning    Prophylaxis:  Lovenox    Disposition:  Home w/Family           ___________________________________________________    Attending Physician: Per Stone MD

## 2017-03-05 NOTE — ED NOTES
TRANSFER - OUT REPORT:    Verbal report given to Elliot Carrero RN (name) on Jennifer Esteban  being transferred to [facility] IVCU/ ICU 3007(unit) for routine progression of care       Report consisted of patients Situation, Background, Assessment and   Recommendations(SBAR). Information from the following report(s) SBAR, Kardex, ED Summary, Intake/Output and MAR was reviewed with the receiving nurse. Lines:   Peripheral IV 03/04/17 Right Antecubital (Active)   Site Assessment Clean, dry, & intact 3/4/2017  3:20 PM   Phlebitis Assessment 0 3/4/2017  3:20 PM   Infiltration Assessment 0 3/4/2017  3:20 PM   Dressing Status Clean, dry, & intact; New 3/4/2017  3:20 PM   Dressing Type Tape;Transparent 3/4/2017  3:20 PM   Hub Color/Line Status Pink;Capped;Flushed;Patent 3/4/2017  3:20 PM   Action Taken Blood drawn 3/4/2017  3:20 PM        Opportunity for questions and clarification was provided.       Patient transported with:   Monitor

## 2017-03-05 NOTE — CONSULTS
Reason for Consult: Chest pain, NSTEMI    HPI: Víctor Potter is a 80 y.o. male with history of PAF, MV repair admitted with symptoms of chest pain. Pain started last night while walking the dog, felt chest heaviness, anterior retrosternal region, radiating to the right, mild SOB, continous. No palpitations, diaphoresis. Presented to [facility] ER and was given ASA. No NTG. Pain spontanously subsided. EKG non ischemic with PVC and couplet. Trop positive and rising. ROS: No cough, fever, chills, abdominal pain, nausea, vomiting, diarrhea, lightheadedness, dizziness, presyncope or syncope. No dysuria or constipation. Past Medical History:   Diagnosis Date    A-fib ([address]     Acid reflux     CAD (coronary artery disease)     Hypertension             Past Surgical History:   Procedure Laterality Date    HX AORTIC VALVE REPLACEMENT               Family History   Problem Relation Age of Onset    Stroke Mother     Hypertension Father            Social History     Social History    Marital status:      Spouse name: N/A    Number of children: N/A    Years of education: N/A     Occupational History    Not on file.      Social History Main Topics    Smoking status: Never Smoker    Smokeless tobacco: Never Used    Alcohol use 4.0 oz/week     8 Cans of beer per week      Comment: every other night    Drug use: No    Sexual activity: Not on file     Other Topics Concern    Not on file     Social History Narrative         No Known Allergies         Current Facility-Administered Medications   Medication Dose Route Frequency Provider Last Rate Last Dose    aspirin chewable tablet 81 mg  81 mg Oral DAILY Annabelle Acosta MD   81 mg at 03/05/17 0844    fluticasone (FLONASE) 50 mcg/actuation nasal spray 2 Spray  2 Spray Both Nostrils DAILY Niya Lundberg MD        enoxaparin (LOVENOX) injection 80 mg  1 mg/kg SubCUTAneous Q12H Marina Payne MD   80 mg at 03/05/17 0844    0.9% sodium chloride infusion  75 mL/hr IntraVENous CONTINUOUS Zack Mcmullen MD 75 mL/hr at 03/04/17 2231 75 mL/hr at 03/04/17 2231    sodium chloride (NS) flush 5-10 mL  5-10 mL IntraVENous Q8H Zack Mcmullen MD   10 mL at 03/05/17 0615    sodium chloride (NS) flush 5-10 mL  5-10 mL IntraVENous PRN Zack Mcmullen MD        acetaminophen (TYLENOL) tablet 650 mg  650 mg Oral Q4H PRN Zack Mcmullen MD        oxyCODONE-acetaminophen (PERCOCET) 5-325 mg per tablet 1 Tab  1 Tab Oral Q4H PRN Zack Mcmullen MD        HYDROmorphone (PF) (DILAUDID) injection 0.5 mg  0.5 mg IntraVENous Q4H PRN Zack Mcmullen MD        zolpidem [facility]) tablet 5 mg  5 mg Oral QHS PRN Zack Mcmullen MD        prochlorperazine (COMPAZINE) with saline injection 5 mg  5 mg IntraVENous Q6H PRN Zack Mcmullen MD        influenza vaccine 2114-06 (36mos+)(PF) (FLUZONE/FLUARIX/FLULAVAL QUAD) injection 0.5 mL  0.5 mL IntraMUSCular PRIOR TO DISCHARGE Zack Mcmullen MD        benzonatate (TESSALON) capsule 100 mg  100 mg Oral TID PRN Zack Mcmullen MD        dicyclomine (BENTYL) tablet 20 mg  20 mg Oral BID PRN Zack Mcmullen MD        dilTIAZem CD (CARDIZEM CD) capsule 240 mg  240 mg Oral QHS Zack Mcmullen MD   240 mg at 03/04/17 2348    lisinopril (PRINIVIL, ZESTRIL) tablet 10 mg  10 mg Oral QHS Zack Mcmullen MD   10 mg at 03/04/17 2349    metoprolol tartrate (LOPRESSOR) tablet 25 mg  25 mg Oral BID Zack Mcmullen MD   25 mg at 03/05/17 0844    pantoprazole (PROTONIX) tablet 40 mg  40 mg Oral ACB Zack Mcmullen MD   40 mg at 03/05/17 6807    ALPRAZolam Marrion Jeans) tablet 0.25 mg  0.25 mg Oral BID PRN Zack Mcmullen MD        Or   Lynelle Coffin ALPRAZolam Marrion Jeans) tablet 0.5 mg  0.5 mg Oral BID PRN Zack Mcmullen MD   0.5 mg at 03/04/17 3478        ROS:  12 point review of systems was performed.  All negative except for HPI     Physical Exam:  Visit Vitals    /78    Pulse (!) 55    Temp 97.8 °F (36.6 °C)    Resp 13    Ht 6' (1.829 m)    Wt 186 lb 8.2 oz (84.6 kg)    SpO2 100%    BMI 25.3 kg/m2       Gen:  Well-developed, well-nourished, in no acute distress  HEENT:  Pink conjunctivae, PERRL, hearing intact to voice, moist mucous membranes  Neck:  Supple, without masses, thyroid non-tender  Resp:  No accessory muscle use, clear breath sounds without wheezes rales or rhonchi  Card:  No murmurs, normal S1, S2 without thrills, bruits or peripheral edema  Abd:  Soft, non-tender, non-distended, normoactive bowel sounds are present, no palpable organomegaly and no detectable hernias  Lymph:  No cervical or inguinal adenopathy  Musc:  No cyanosis or clubbing  Skin:  No rashes or ulcers, skin turgor is good  Neuro:  Cranial nerves are grossly intact, no focal motor weakness, follows commands appropriately  Psych:  Good insight, oriented to person, place and time, alert     Labs:     Lab Results  Component Value Date/Time   WBC 6.4 03/05/2017 05:27 AM   HGB 13.0 03/05/2017 05:27 AM   HCT 41.0 03/05/2017 05:27 AM   PLATELET 136 48/88/6471 05:27 AM   MCV 95.1 03/05/2017 05:27 AM       Lab Results  Component Value Date/Time   Glucose 98 03/05/2017 05:27 AM   LDL, calculated 75.8 03/05/2017 05:27 AM   Creatinine 0.82 03/05/2017 05:27 AM      Lab Results  Component Value Date/Time   Cholesterol, total 153 03/05/2017 05:27 AM   HDL Cholesterol 64 03/05/2017 05:27 AM   LDL, calculated 75.8 03/05/2017 05:27 AM   Triglyceride 66 03/05/2017 05:27 AM   CHOL/HDL Ratio 2.4 03/05/2017 05:27 AM       No results found for: GPT, ALT, SGOT, GGT, GGTP, AP, APIT, APX, CBIL, TBIL, TBILI    No results found for: INR, PTMR, PTP, PT1, PT2   Lab Results  Component Value Date/Time   GFR est AA >60 03/05/2017 05:27 AM   GFR est non-AA >60 03/05/2017 05:27 AM   Creatinine 0.82 03/05/2017 05:27 AM   BUN 17 03/05/2017 05:27 AM   Sodium 142 03/05/2017 05:27 AM   Potassium 4.5 03/05/2017 05:27 AM   Chloride 109 03/05/2017 05:27 AM   CO2 24 03/05/2017 05:27 AM      No results found for: PSA, PSA2, Edmund Rojas, PSAR3, [id number], [id number], PSALT  No results found for: TSH, TSH2, TSH3, TSHP, TSHELE, TSHEXT, TT3, T3U, T3UP, FRT3, FT3, FT4, FT4P, T4, T4P, FT4T, TT7, TSHEXT   Lab Results   Component Value Date/Time    Glucose 98 03/05/2017 05:27 AM      Lab Results   Component Value Date/Time     03/05/2017 05:27 AM    Troponin-I, Qt. 15.13 03/05/2017 05:27 AM      No results found for: BNP, BNPP, BNPPPOC, XBNPT, BNPNT   Lab Results   Component Value Date/Time    Sodium 142 03/05/2017 05:27 AM    Potassium 4.5 03/05/2017 05:27 AM    Chloride 109 03/05/2017 05:27 AM    CO2 24 03/05/2017 05:27 AM    Anion gap 9 03/05/2017 05:27 AM    Glucose 98 03/05/2017 05:27 AM    BUN 17 03/05/2017 05:27 AM    Creatinine 0.82 03/05/2017 05:27 AM    BUN/Creatinine ratio 21 03/05/2017 05:27 AM    GFR est AA >60 03/05/2017 05:27 AM    GFR est non-AA >60 03/05/2017 05:27 AM    Calcium 8.5 03/05/2017 05:27 AM      Lab Results   Component Value Date/Time    Sodium 142 03/05/2017 05:27 AM    Potassium 4.5 03/05/2017 05:27 AM    Chloride 109 03/05/2017 05:27 AM    CO2 24 03/05/2017 05:27 AM    Anion gap 9 03/05/2017 05:27 AM    Glucose 98 03/05/2017 05:27 AM    BUN 17 03/05/2017 05:27 AM    Creatinine 0.82 03/05/2017 05:27 AM    BUN/Creatinine ratio 21 03/05/2017 05:27 AM    GFR est AA >60 03/05/2017 05:27 AM    GFR est non-AA >60 03/05/2017 05:27 AM    Calcium 8.5 03/05/2017 05:27 AM      No results found for: HBA1C, FNH2EOXO, HGBE8, RGK3SIVR, FES0EBWM        Recent Labs      03/05/17   0527   CPK  421*   TROIQ  15.13*     Diagnostic Tests:   EKG: S bradycardia, PVC, Couplet. No ST changes.        Problem List:     Problem List  Date Reviewed: 3/5/2017          Codes Class Noted    * (Principal)[facility] (non-ST elevated [facility] infarction) [facility] ICD-10-CM: I21.4  ICD-9-CM: 410.70  3/4/2017        HTN (hypertension) (Chronic) ICD-10-CM: I10  ICD-9-CM: 401.9  3/4/2017        CAD (coronary artery disease) (Chronic) ICD-10-CM: I25.10  ICD-9-CM: 414.00  3/4/2017        Atrial fibrillation (HCC) (Chronic) ICD-10-CM: I48.91  ICD-9-CM: 427.31  3/4/2017              Plan:    1. NSTEMI: Rapid rise of troponin to 15. No chest pain and no EKG changes of ischemia. Now on ASA, Lovenox, Cardizem. Continue. Check Echo for MV and LV function. If Trop is plateaued then Cath tomorrow but if continue to rise then cath today. Keep NPO. LHC +/- PCI/RHC consent   The risks (including but not limited to death, myocardial infarction, cerebrovascular accident, dysrhythmia, renal failure +/-dialysis, vascular complication, allergy, and/or need for emergency surgery), indications, benefits, and alternatives of cardiac catheterization +/- PCI have been explained in detail to the patient. Patient and family informed that if patient needs surgery - it will be at [facility] as [facility] does not have onsite surgery. All questions answered to patient's satisfaction. Patient agrees to proceed with the procedure and informed consent was obtained. MP (2) ASA (2)    2. PAF: Was prior to MV repair. No recurrence. Not on anticoagulation. 3. MV repair: Check Echo. 4. HTN: Continue Lisinopril, Cardizem. Kareem Merino MD, [facility]

## 2017-03-05 NOTE — PROGRESS NOTES
[facility]: MED RECONCILIATION    Comments/Recommendations:   Patient is awake, alert, and knowledgeable about home medications   Verifies NKA  Reports compliance to prescribed regimen  Counseled the patient to:   avoid all products containing pseudoephedrine and phenylephrine  Limit Afrin use to 3 consecutive days    Medications added:      Tessalon   Bentyl   NyQuil cough   DayQuil   Afrin      Medications adjusted:    · Metoprolol changed from succinate to tartate    Information obtained from: patient , Rx Query    Significant PMH/Disease States:   Patient Active Problem List   Diagnosis Code    NSTEMI (non-ST elevated myocardial infarction) ([facility] I21.4    HTN (hypertension) I10    CAD (coronary artery disease) I25.10    [facility] I48.91     Past Medical History:   Diagnosis Date    A-fib ([facility]     Acid reflux     CAD (coronary artery disease)     Hypertension      Chief Complaint for this Admission:   Chief Complaint   Patient presents with    Chest Pain (Angina)     Allergies: Review of patient's allergies indicates no known allergies. Prior to Admission Medications:     Prior to Admission Medications   Prescriptions Last Dose Informant Patient Reported? Taking? ALPRAZolam (XANAX) 0.5 mg tablet 3/4/2017 Self Yes Yes   Sig: Take 0.25-0.5 mg by mouth two (2) times daily as needed for Anxiety (Patient reports anxiety attacks). Phenylephrine-DM-Acetaminophen (VICKS DAYQUIL COLD-FLU RELIEF) 5- mg/15 mL liqd  Self Yes Yes   Sig: Take 15 mL by mouth daily as needed. azithromycin (ZITHROMAX) 250 mg tablet 3/4/2017 at Unknown time Self Yes Yes   Sig: Take 250 mg by mouth daily. X 5 days started 2/28/17   benzonatate (TESSALON PERLES) 100 mg capsule  Self Yes Yes   Sig: Take 100 mg by mouth three (3) times daily as needed for Cough. dicyclomine (BENTYL) 20 mg tablet  Self Yes Yes   Sig: Take 20 mg by mouth two (2) times daily as needed.    dilTIAZem CD (CARDIZEM CD) 240 mg ER capsule 3/3/2017 at Unknown time Self Yes Yes   Sig: Take 240 mg by mouth nightly. doxylamine-dm (VICKS NYQUIL COUGH) 6.25-15 mg/15 mL soln 3/3/2017 at Unknown time Self Yes Yes   Sig: Take 15 mL by mouth nightly as needed. lisinopril (PRINIVIL, ZESTRIL) 10 mg tablet 3/3/2017 at Unknown time Self Yes Yes   Sig: Take 10 mg by mouth nightly. metoprolol tartrate (LOPRESSOR) 25 mg tablet 3/4/2017 at am Self Yes Yes   Sig: Take 25 mg by mouth two (2) times a day. oxymetazoline (AFRIN, OXYMETAZOLINE,) 0.05 % nasal spray 3/3/2017 at Unknown time Self Yes Yes   Si Sprays by Both Nostrils route daily as needed for Congestion. pantoprazole (PROTONIX) 40 mg tablet 3/4/2017 at am Self Yes Yes   Sig: Take 40 mg by mouth Daily (before breakfast).  Indications: GASTROESOPHAGEAL REFLUX      Facility-Administered Medications: None        Thank you,      Racheal Crenshaw, PharmD, BCPS

## 2017-03-05 NOTE — PROGRESS NOTES
2211: TRANSFER - IN REPORT:    Verbal report received from [facility]) on Víctor Potter  being received from Abelardo RN(unit) for routine progression of care      Report consisted of patients Situation, Background, Assessment and   Recommendations(SBAR). Information from the following report(s) SBAR, Kardex and ED Summary was reviewed with the receiving nurse. Opportunity for questions and clarification was provided. Assessment completed upon patients arrival to unit and care assumed. Primary Nurse Ammy Gomez, REJI and Sammi RN, performed a dual skin assessment on this patient No acute impairment noted. Patient does have chest scarring from previous open heart surgery  Zane score is 23    Received patient to unit. VSS. Patient complaining of moderate chest pain. Patient repositioned. Reports relieved pain. Dr. Zoila Boggs paged to notify that patient has arrived to unit from [facility] [place], operated by [facility]    2230: Dr. Zoila Boggs at bedside discussing plan of care with patient and patient's wife    21 : Pharmacist at bedside for med rec    0018: Lab called to report Troponin 4.07. Dr. Fermin Crain cardiology group paged with critical result. Per Dr. Drea Godfrey, consult Dr. Vinny Peguero group. Dr. Zoila Boggs called and made aware of elevated troponin value and change in cardiology consult. Dr. Pelon Velasquez paged for STAT consult. 0020: Spoke with Dr. Pelon Velasquez. Informed him of patient's status, critical labs, etc. Physician states that because the patient is not complaining of any chest pain and is hemodynamically stable, no immediate action is necessary. States that they will likely do an exploratory cath later on. Per Dr. Pelon Velasquez, continue NS IVF and repeat troponin Q6H. Physician states that the next troponin result may be more elevated than the last one, but he does not need to be paged about this result. Dr. Pelon Velasquez did request patient be NPO overnight in the event that a STAT cath is required for pt status change.     0520: AM labs drawn, including second tube of serial troponin. 7752: Cardiology paged regarding elevated troponin with AM labs. Patient remains asymptomatic, without chest pain or SOB.     0701: Physician (Dr. Adan Yanez) notified, no new orders    0700: Bedside and Verbal shift change report given to Hailey Caraballo RN (oncoming nurse) by Marichuy Merino (offgoing nurse). Report included the following information SBAR, Kardex and ED Summary.

## 2017-03-05 NOTE — ED NOTES
Bedside and Verbal shift change report given to Leni Bey RN (oncoming nurse) by Sherry Pozo RN (offgoing nurse). Report included the following information ED Summary and Recent Results.

## 2017-03-05 NOTE — PROGRESS NOTES
Problem: Falls - Risk of  Goal: *Absence of falls  Outcome: Progressing Towards Goal     BEDSIDE REPORT - ASSUME CARE     0700: Bedside shift change report received by Jayda Gupta RN. Report given with SBAR, Kardex, Intake/Output and Recent Results. Opportunity for questions and clarification was provided. Assumed care of patient. Safety instructions given and received by pt. Problem: Pressure Ulcer - Risk of  Goal: *Prevention of pressure ulcer  Outcome: Progressing Towards Goal  Turns self. Denies bedsores. Problem: Deep Venous Thrombosis - Risk of  Goal: *Absence of deep venous thrombosis signs and symptoms(Stroke Metric)  Outcome: Progressing Towards Goal  In progress. Problem: Unstable angina/NSTEMI: Day of Admission/Day 1  Goal: Activity/Safety  Outcome: Progressing Towards Goal  Advised he needs to remain on bedrest for everything. Stable VS. Denies chest discomfort. 1000: Labs to dept. 1100: Informed Dr. Inna Cornelius of Trop results. Stated pt. May resume diet. Problem: Cath Lab Procedures: Pre-Procedure  Goal: *Verbalize description of procedure  Outcome: Progressing Towards Goal  Asks questions appropriately. BEDSIDE REPORT TO ONCOMING RN    1900: Bedside shift change report given to RN (oncoming nurse) by Joelle Peguero (offgoing nurse). Report given with SBAR, Kardex, Intake/Output and Recent Results. Opportunity for questions and clarification was provided.

## 2017-03-05 NOTE — PROGRESS NOTES
2211: TRANSFER - IN REPORT:    Verbal report received from [facility]) on Nigel Patel  being received from Abelardo RN(unit) for routine progression of care      Report consisted of patients Situation, Background, Assessment and   Recommendations(SBAR). Information from the following report(s) SBAR, Kardex and ED Summary was reviewed with the receiving nurse. Opportunity for questions and clarification was provided. Assessment completed upon patients arrival to unit and care assumed. Primary Nurse Juanito Hameed RN and Sammi MENDOZA, RN performed a dual skin assessment on this patient No acute impairment noted. Patient does have chest scarring from previous open heart surgery  Zane score is 23    Received patient to unit. VSS. Patient complaining of moderate chest pain. Patient repositioned. Reports relieved pain. Dr. Moni Olivas paged to notify that patient has arrived to unit from [facility]    2230: Dr. Moni Olivas at bedside discussing plan of care with patient and patient's wife    21 : Pharmacist at bedside for med rec    0018: Lab called to report Troponin 4.07. Dr. Hieu Mckenzie cardiology paged with critical result. Per Dr. Angel Arias, consult Dr. Jes العراقي group.  Dr. Moni Olivas aware of elevated troponin

## 2017-03-05 NOTE — PROGRESS NOTES
Problem: Unstable angina/NSTEMI: Day of Admission/Day 1  Goal: Consults, if ordered  Outcome: Progressing Towards Goal  Cardiology consult done by previous RN  Goal: Diagnostic Test/Procedures  Outcome: Progressing Towards Goal  Ordered cardiac tests in AM. EKG complete

## 2017-03-05 NOTE — H&P
[facility]  [address], [facility] [address]  [phone]    Admission History and Physical      NAME:  Aleah Siu   :   1945   MRN:  [id number]     PCP:  Orion Albrecht MD     Date/Time:  3/4/2017         Subjective:     CHIEF COMPLAINT: Chest pain     HISTORY OF PRESENT ILLNESS:     Mr. Wilton Black is a 80 y.o.  male who is admitted with possible NSTEMI. Mr. Wilton Black presented to the [facility] Emergency Department this PM complaining of chest pain: this evening, while walking his dog, right sided, pressure-like, constant, lasted ~30 minutes. Has recently been treated for URI with azithromycin    History obtained from spouse, chart review and the patient. Past Medical History:   Diagnosis Date    A-fib ([facility]     Acid reflux     CAD (coronary artery disease)     Hypertension         Past Surgical History:   Procedure Laterality Date    HX AORTIC VALVE REPLACEMENT         Social History   Substance Use Topics    Smoking status: Never Smoker    Smokeless tobacco: Never Used    Alcohol use 4.0 oz/week     8 Cans of beer per week      Comment: every other night        Family History   Problem Relation Age of Onset    Stroke Mother     Hypertension Father         No Known Allergies     Prior to Admission medications    Medication Sig Start Date End Date Taking? Authorizing Provider   azithromycin (ZITHROMAX) 250 mg tablet Take 250 mg by mouth daily. Yes Pauline Engle MD   dilTIAZem CD (CARDIZEM CD) 240 mg ER capsule Take 240 mg by mouth daily. Yes Pauline Engle MD   metoprolol succinate (TOPROL-XL) 25 mg XL tablet Take 25 mg by mouth two (2) times a day. Yes Pauline Engle MD   lisinopril (PRINIVIL, ZESTRIL) 5 mg tablet Take 10 mg by mouth daily.    Yes Pauline Engle MD         Review of Systems:  (bold if positive, if negative)    Gen:  Eyes:  ENT:  rhinorrheaCVS:  chest painPulm:  CoughGI:    :    MS:  Skin:  Psych:  Endo:    Hem: Renal:    Neuro:            Objective:      VITALS:    Vital signs reviewed; most recent are:    Visit Vitals    /76    Pulse (!) 57    Temp 97.5 °F (36.4 °C)    Resp 11    Ht 6' (1.829 m)    Wt 80.7 kg (178 lb)    SpO2 98%    BMI 24.14 kg/m2     SpO2 Readings from Last 6 Encounters:   03/04/17 98%   01/25/14 98%            Intake/Output Summary (Last 24 hours) at 03/04/17 2225  Last data filed at 03/04/17 2100   Gross per 24 hour   Intake                0 ml   Output              440 ml   Net             -440 ml            Exam:     Physical Exam:    Gen: Well-developed, well-nourished, in no acute distress  HEENT:  Pink conjunctivae, PERRL, hearing intact to voice, moist mucous membranes  Neck: Supple, without masses, thyroid non-tender  Resp: No accessory muscle use, clear breath sounds without wheezes rales or rhonchi  Card: No murmurs, normal S1, S2 without thrills, bruits or peripheral edema  Abd:  Soft, non-tender, non-distended, normoactive bowel sounds are present, no palpable organomegaly and no detectable hernias  Lymph:  No cervical or inguinal adenopathy  Musc: No cyanosis or clubbing  Skin: No rashes or ulcers, skin turgor is good  Neuro:  Cranial nerves are grossly intact, no focal motor weakness, follows commands appropriately  Psych:  Good insight, oriented to person, place and time, alert             Labs:    Recent Labs      03/04/17   1525   WBC  11.2*   HGB  14.5   HCT  43.3   PLT  215     Recent Labs      03/04/17   1525   NA  140   K  3.5   CL  105   CO2  21   GLU  95   BUN  21*   CREA  1.12   CA  9.6     No results found for: GLUCPOC  No results for input(s): PH, PCO2, PO2, HCO3, FIO2 in the last 72 hours. No results for input(s): INR in the last 72 hours.     No lab exists for component: INREXT, INREXT    Additional testing:  Initial EKG: sinus rhythm with prequent PVCs without acute ischemia       Assessment/Plan:       Principal Problem:    NSTEMI (non-ST elevated myocardial infarction) ([facility] (3/4/2017)   - possible, need further enzymes to clarify   - serial enzymes overnight, close monitoring in IVCU in light of frequent ectopy   - Cardiology consult   - echocardiogram   - full dose enoxaparin   - check lipids in AM    Active Problems:    HTN (hypertension) (3/4/2017)   - BP okay, follow on home meds      CAD (coronary artery disease) (3/4/2017)   - possible NSTEMI as above   - continue cardiac meds      Atrial fibrillation ([facility] (3/4/2017)   - in sinus here, not on chronic anticoagulation, full dose enoxaparin as above for possible NSTEMI   - continue rate control medications       Code status:   - patient is FULL CODE      Total time spent with patient: [address] discussed with: Patient, Family, Nursing Staff and Dr. Bharath Peralta    Discussed:  Code Status, Care Plan and D/C Planning    Prophylaxis:  Lovenox and SCD's    Probable Disposition:  Home w/Family           ___________________________________________________    Attending Physician: Susan Bailey MD

## 2017-03-05 NOTE — ED NOTES
Dr Celso Valencia at bedside. Plan of care explained and all questions answered. Pt agreed to admission at [facility]. Wife and daughter at bedside. Pillow and blanket given for comfort. Pt denies pain at present.

## 2017-03-06 VITALS
WEIGHT: 186.51 LBS | SYSTOLIC BLOOD PRESSURE: 100 MMHG | TEMPERATURE: 98.2 F | RESPIRATION RATE: 16 BRPM | HEIGHT: 72 IN | DIASTOLIC BLOOD PRESSURE: 68 MMHG | OXYGEN SATURATION: 96 % | HEART RATE: 51 BPM | BODY MASS INDEX: 25.26 KG/M2

## 2017-03-06 PROBLEM — Z98.890 S/P MITRAL VALVE REPAIR: Chronic | Status: ACTIVE | Noted: 2017-03-06

## 2017-03-06 LAB
ANION GAP BLD CALC-SCNC: 8 MMOL/L (ref 5–15)
BACTERIA SPEC CULT: NORMAL
BACTERIA SPEC CULT: NORMAL
BASOPHILS # BLD AUTO: 0 K/UL (ref 0–0.1)
BASOPHILS # BLD: 1 % (ref 0–1)
BUN SERPL-MCNC: 16 MG/DL (ref 6–20)
BUN/CREAT SERPL: 20 (ref 12–20)
CALCIUM SERPL-MCNC: 8.2 MG/DL (ref 8.5–10.1)
CHLORIDE SERPL-SCNC: 109 MMOL/L (ref 97–108)
CO2 SERPL-SCNC: 25 MMOL/L (ref 21–32)
CREAT SERPL-MCNC: 0.81 MG/DL (ref 0.7–1.3)
EOSINOPHIL # BLD: 0.2 K/UL (ref 0–0.4)
EOSINOPHIL NFR BLD: 3 % (ref 0–7)
ERYTHROCYTE [DISTWIDTH] IN BLOOD BY AUTOMATED COUNT: 13.5 % (ref 11.5–14.5)
GLUCOSE SERPL-MCNC: 109 MG/DL (ref 65–100)
HCT VFR BLD AUTO: 39.6 % (ref 36.6–50.3)
HGB BLD-MCNC: 13 G/DL (ref 12.1–17)
LYMPHOCYTES # BLD AUTO: 28 % (ref 12–49)
LYMPHOCYTES # BLD: 2 K/UL (ref 0.8–3.5)
MAGNESIUM SERPL-MCNC: 1.9 MG/DL (ref 1.6–2.4)
MCH RBC QN AUTO: 31.1 PG (ref 26–34)
MCHC RBC AUTO-ENTMCNC: 32.8 G/DL (ref 30–36.5)
MCV RBC AUTO: 94.7 FL (ref 80–99)
MONOCYTES # BLD: 0.6 K/UL (ref 0–1)
MONOCYTES NFR BLD AUTO: 8 % (ref 5–13)
NEUTS SEG # BLD: 4.3 K/UL (ref 1.8–8)
NEUTS SEG NFR BLD AUTO: 60 % (ref 32–75)
PLATELET # BLD AUTO: 161 K/UL (ref 150–400)
POTASSIUM SERPL-SCNC: 3.9 MMOL/L (ref 3.5–5.1)
RBC # BLD AUTO: 4.18 M/UL (ref 4.1–5.7)
SERVICE CMNT-IMP: NORMAL
SODIUM SERPL-SCNC: 142 MMOL/L (ref 136–145)
WBC # BLD AUTO: 7.2 K/UL (ref 4.1–11.1)

## 2017-03-06 PROCEDURE — 83735 ASSAY OF MAGNESIUM: CPT | Performed by: INTERNAL MEDICINE

## 2017-03-06 PROCEDURE — 36415 COLL VENOUS BLD VENIPUNCTURE: CPT | Performed by: INTERNAL MEDICINE

## 2017-03-06 PROCEDURE — 77030028837 HC SYR ANGI PWR INJ COEU -A

## 2017-03-06 PROCEDURE — 74011250637 HC RX REV CODE- 250/637: Performed by: INTERNAL MEDICINE

## 2017-03-06 PROCEDURE — 74011000250 HC RX REV CODE- 250: Performed by: INTERNAL MEDICINE

## 2017-03-06 PROCEDURE — 74011250636 HC RX REV CODE- 250/636: Performed by: INTERNAL MEDICINE

## 2017-03-06 PROCEDURE — C1760 CLOSURE DEV, VASC: HCPCS

## 2017-03-06 PROCEDURE — 4A023N7 MEASUREMENT OF CARDIAC SAMPLING AND PRESSURE, LEFT HEART, PERCUTANEOUS APPROACH: ICD-10-PCS | Performed by: INTERNAL MEDICINE

## 2017-03-06 PROCEDURE — 74011250636 HC RX REV CODE- 250/636: Performed by: FAMILY MEDICINE

## 2017-03-06 PROCEDURE — B2151ZZ FLUOROSCOPY OF LEFT HEART USING LOW OSMOLAR CONTRAST: ICD-10-PCS | Performed by: INTERNAL MEDICINE

## 2017-03-06 PROCEDURE — 80048 BASIC METABOLIC PNL TOTAL CA: CPT | Performed by: INTERNAL MEDICINE

## 2017-03-06 PROCEDURE — 77030004532 HC CATH ANGI DX IMP BSC -A

## 2017-03-06 PROCEDURE — 74011250637 HC RX REV CODE- 250/637: Performed by: FAMILY MEDICINE

## 2017-03-06 PROCEDURE — 90686 IIV4 VACC NO PRSV 0.5 ML IM: CPT | Performed by: INTERNAL MEDICINE

## 2017-03-06 PROCEDURE — C1894 INTRO/SHEATH, NON-LASER: HCPCS

## 2017-03-06 PROCEDURE — 93458 L HRT ARTERY/VENTRICLE ANGIO: CPT

## 2017-03-06 PROCEDURE — B2111ZZ FLUOROSCOPY OF MULTIPLE CORONARY ARTERIES USING LOW OSMOLAR CONTRAST: ICD-10-PCS | Performed by: INTERNAL MEDICINE

## 2017-03-06 PROCEDURE — 85025 COMPLETE CBC W/AUTO DIFF WBC: CPT | Performed by: INTERNAL MEDICINE

## 2017-03-06 PROCEDURE — 74011636320 HC RX REV CODE- 636/320: Performed by: INTERNAL MEDICINE

## 2017-03-06 PROCEDURE — 90471 IMMUNIZATION ADMIN: CPT

## 2017-03-06 PROCEDURE — 3E0234Z INTRODUCTION OF SERUM, TOXOID AND VACCINE INTO MUSCLE, PERCUTANEOUS APPROACH: ICD-10-PCS | Performed by: INTERNAL MEDICINE

## 2017-03-06 RX ORDER — SODIUM CHLORIDE 0.9 % (FLUSH) 0.9 %
5-10 SYRINGE (ML) INJECTION AS NEEDED
Status: DISCONTINUED | OUTPATIENT
Start: 2017-03-06 | End: 2017-03-06 | Stop reason: HOSPADM

## 2017-03-06 RX ORDER — SODIUM CHLORIDE 0.9 % (FLUSH) 0.9 %
5-10 SYRINGE (ML) INJECTION EVERY 8 HOURS
Status: DISCONTINUED | OUTPATIENT
Start: 2017-03-06 | End: 2017-03-06 | Stop reason: HOSPADM

## 2017-03-06 RX ORDER — CLOPIDOGREL BISULFATE 75 MG/1
75 TABLET ORAL DAILY
Qty: 30 TAB | Refills: 0 | Status: SHIPPED | OUTPATIENT
Start: 2017-03-07 | End: 2019-02-25

## 2017-03-06 RX ORDER — HYDROCODONE BITARTRATE AND ACETAMINOPHEN 7.5; 325 MG/1; MG/1
1 TABLET ORAL
Status: DISCONTINUED | OUTPATIENT
Start: 2017-03-06 | End: 2017-03-06 | Stop reason: HOSPADM

## 2017-03-06 RX ORDER — POTASSIUM CHLORIDE 750 MG/1
20 TABLET, FILM COATED, EXTENDED RELEASE ORAL
Status: COMPLETED | OUTPATIENT
Start: 2017-03-06 | End: 2017-03-06

## 2017-03-06 RX ORDER — MORPHINE SULFATE 2 MG/ML
2 INJECTION, SOLUTION INTRAMUSCULAR; INTRAVENOUS
Status: DISCONTINUED | OUTPATIENT
Start: 2017-03-06 | End: 2017-03-06 | Stop reason: HOSPADM

## 2017-03-06 RX ORDER — DILTIAZEM HYDROCHLORIDE 240 MG/1
240 CAPSULE, COATED, EXTENDED RELEASE ORAL
Status: DISCONTINUED | OUTPATIENT
Start: 2017-03-06 | End: 2017-03-06 | Stop reason: HOSPADM

## 2017-03-06 RX ORDER — GUAIFENESIN 100 MG/5ML
81 LIQUID (ML) ORAL DAILY
Qty: 30 TAB | Refills: 0 | Status: SHIPPED | OUTPATIENT
Start: 2017-03-06 | End: 2019-02-25

## 2017-03-06 RX ORDER — MAGNESIUM SULFATE HEPTAHYDRATE 40 MG/ML
2 INJECTION, SOLUTION INTRAVENOUS ONCE
Status: COMPLETED | OUTPATIENT
Start: 2017-03-06 | End: 2017-03-06

## 2017-03-06 RX ORDER — MIDAZOLAM HYDROCHLORIDE 1 MG/ML
.5-1 INJECTION, SOLUTION INTRAMUSCULAR; INTRAVENOUS
Status: DISCONTINUED | OUTPATIENT
Start: 2017-03-06 | End: 2017-03-06 | Stop reason: HOSPADM

## 2017-03-06 RX ORDER — FENTANYL CITRATE 50 UG/ML
25-200 INJECTION, SOLUTION INTRAMUSCULAR; INTRAVENOUS
Status: DISCONTINUED | OUTPATIENT
Start: 2017-03-06 | End: 2017-03-06 | Stop reason: HOSPADM

## 2017-03-06 RX ORDER — HEPARIN SODIUM 200 [USP'U]/100ML
500 INJECTION, SOLUTION INTRAVENOUS ONCE
Status: COMPLETED | OUTPATIENT
Start: 2017-03-06 | End: 2017-03-06

## 2017-03-06 RX ORDER — DILTIAZEM HYDROCHLORIDE 120 MG/1
120 CAPSULE, COATED, EXTENDED RELEASE ORAL
Status: DISCONTINUED | OUTPATIENT
Start: 2017-03-06 | End: 2017-03-06

## 2017-03-06 RX ORDER — CLOPIDOGREL BISULFATE 75 MG/1
75 TABLET ORAL DAILY
Status: DISCONTINUED | OUTPATIENT
Start: 2017-03-07 | End: 2017-03-06 | Stop reason: HOSPADM

## 2017-03-06 RX ORDER — HEPARIN SODIUM 200 [USP'U]/100ML
500 INJECTION, SOLUTION INTRAVENOUS
Status: DISCONTINUED | OUTPATIENT
Start: 2017-03-06 | End: 2017-03-06 | Stop reason: HOSPADM

## 2017-03-06 RX ORDER — LIDOCAINE HYDROCHLORIDE 10 MG/ML
10-30 INJECTION INFILTRATION; PERINEURAL
Status: DISCONTINUED | OUTPATIENT
Start: 2017-03-06 | End: 2017-03-06 | Stop reason: HOSPADM

## 2017-03-06 RX ORDER — CLOPIDOGREL BISULFATE 75 MG/1
300 TABLET ORAL ONCE
Status: DISCONTINUED | OUTPATIENT
Start: 2017-03-06 | End: 2017-03-06

## 2017-03-06 RX ORDER — CLOPIDOGREL BISULFATE 75 MG/1
75 TABLET ORAL ONCE
Status: COMPLETED | OUTPATIENT
Start: 2017-03-06 | End: 2017-03-06

## 2017-03-06 RX ADMIN — PANTOPRAZOLE SODIUM 40 MG: 40 TABLET, DELAYED RELEASE ORAL at 09:02

## 2017-03-06 RX ADMIN — POTASSIUM CHLORIDE 20 MEQ: 750 TABLET, FILM COATED, EXTENDED RELEASE ORAL at 09:02

## 2017-03-06 RX ADMIN — FENTANYL CITRATE 25 MCG: 50 INJECTION, SOLUTION INTRAMUSCULAR; INTRAVENOUS at 13:04

## 2017-03-06 RX ADMIN — CLOPIDOGREL 75 MG: 75 TABLET, FILM COATED ORAL at 15:39

## 2017-03-06 RX ADMIN — METOPROLOL TARTRATE 25 MG: 25 TABLET ORAL at 09:02

## 2017-03-06 RX ADMIN — Medication 10 ML: at 16:19

## 2017-03-06 RX ADMIN — HEPARIN SODIUM 1000 UNITS: 200 INJECTION, SOLUTION INTRAVENOUS at 13:07

## 2017-03-06 RX ADMIN — MIDAZOLAM HYDROCHLORIDE 2 MG: 1 INJECTION, SOLUTION INTRAMUSCULAR; INTRAVENOUS at 13:00

## 2017-03-06 RX ADMIN — FLUTICASONE PROPIONATE 2 SPRAY: 50 SPRAY, METERED NASAL at 11:24

## 2017-03-06 RX ADMIN — MIDAZOLAM HYDROCHLORIDE 2 MG: 1 INJECTION, SOLUTION INTRAMUSCULAR; INTRAVENOUS at 13:04

## 2017-03-06 RX ADMIN — FENTANYL CITRATE 50 MCG: 50 INJECTION, SOLUTION INTRAMUSCULAR; INTRAVENOUS at 12:54

## 2017-03-06 RX ADMIN — MAGNESIUM SULFATE HEPTAHYDRATE 2 G: 40 INJECTION, SOLUTION INTRAVENOUS at 09:02

## 2017-03-06 RX ADMIN — MIDAZOLAM HYDROCHLORIDE 1 MG: 1 INJECTION, SOLUTION INTRAMUSCULAR; INTRAVENOUS at 13:10

## 2017-03-06 RX ADMIN — INFLUENZA VIRUS VACCINE 0.5 ML: 15; 15; 15; 15 SUSPENSION INTRAMUSCULAR at 16:50

## 2017-03-06 RX ADMIN — LIDOCAINE HYDROCHLORIDE 20 ML: 10 INJECTION, SOLUTION INFILTRATION; PERINEURAL at 13:07

## 2017-03-06 RX ADMIN — IOPAMIDOL 100 ML: 755 INJECTION, SOLUTION INTRAVENOUS at 13:34

## 2017-03-06 RX ADMIN — ASPIRIN 81 MG CHEWABLE TABLET 81 MG: 81 TABLET CHEWABLE at 09:02

## 2017-03-06 RX ADMIN — MIDAZOLAM HYDROCHLORIDE 1 MG: 1 INJECTION, SOLUTION INTRAMUSCULAR; INTRAVENOUS at 13:20

## 2017-03-06 NOTE — CARDIO/PULMONARY
Cardiac Rehab: 81 yo male admitted with chest pain (3/4). Initially NSTEMI was suspected with peak troponin 15. S/P cath (3/6). LVEF 60% by echo (3/4/17), with mod LAE, mild MR/TR, and mild AR. Cardiologist is Dr Geeta Adan. 3/6/2017 Received consult for cardiac teaching on MI. Cardiac hx includes: PAF & MV repair. Info attached to AVS on MI. Met briefly with patient, who was sitting up in bed on CCU. His wife of 50 years was also present. Pt reported he resides with his wife in [place], and is retired from [place] with [employer]. Pt indicated he is anxiously waiting for his heart cath. Cardiac teaching deferred at this time, due to pt's anxiety level. Patient was discharged from CCU after cath. Per Cardiology, small vessel disease vs viral etiology. Recommend outpatient cardiac MRI. Pt to f/u with Dr Lei Smith. Mailed cardiac teaching packet to patient, with handouts on Mnyx vascular closure device, cardiac MRI, nutrition, and cholesterol. 3/7/2017 Spoke to patient on telephone to review cardiac teaching. Discussed pt's understanding of cath results, tx plan, and cardiac hx. Pt reported nurse practitioner SUZY Leonard told him his cath did not show any blockages & explained possible small vessel disease. He was aware of recommendation for cardiac MRI. Pt reported his MV repair was done at [facility] [place] (2012), because he did not want to have a MV replacement. Also had episodes of AFib in the past. Stated, \"I always knew when I was in AFib. \" Aware of increased risk of CVA/PE with PAF, but has not been on anticoagulation. Reviewed post-cath instructions via femoral site, with emphasis on temporary restrictions, signs/symptoms of infection, and what to do if bleeding occurs. Pt reported he removed dressing in the shower today and site looked fine. Reviewed lipid panel results. Pt reported he walks his rat terrier dog for exercise; walked 2 miles today. Has scheduled appt with Dr Jony Schulte scheduled. Mailed to patient cardiac teaching packet with handouts on cardiac MRI, Mynx vascular closure device, nutrition, cholesterol, warning signs, know your numbers, and heart risk assessment. Cardiac Meds:  ACE/ARB - lisinopril  BB - metoprolol  Statin - none (see lipid panel, 3/5/17). ASA - started 81 mg  Prior to admission meds also included: diltiazem. Diet education: 3/6/2017 Pt is NPO for cath. Expressed frustration with not having eaten in over 18 hours. Hgb A1c unavailable. 3/7/2017 Reviewed Heart Healthy diet. Provided booklet on Eating for a Healthier Heart, and handout on dining out tips. Smoking Cessation: Never smoked. Medication education: 3/6/2017 Medication teaching provided by CCU nurse with discharge instructions. 3/7/2017 Reviewed current meds. Pt denied any difficulties obtaining or taking his meds. New Scheduled PO Meds this admission: 3/6/2017 Plavix and aspirin. Handouts on new PO meds provided with discharge instructions. 3/7/2017 Discussed Plavix & aspirin. Pt reported he just got his Plavix RX filled and started taking Plavix today. Reinforced med compliance.    Concern for Knowledge Deficit: 3/7/2017 Pt verbalized understanding of info provided, and all questions were answered.

## 2017-03-06 NOTE — ADVANCED PRACTICE NURSE
Cath results noted,   Suspect MI- small vessel  Vs viral etiology-  obtain cardiac MRI - as OP     add  plavix x 1 month   Favor  anticoagulation w/ NOAC  Given PAF and [id number] Vasc risk- defer to Dr Isabelle Bryan      F/u Dr Isabelle Bryan 1 week     OK for DC later today from [facility]

## 2017-03-06 NOTE — PROGRESS NOTES
Bedside and Verbal shift change report given to Noreen Lazaro RN (oncoming nurse) by Alma Tamayo RN (offgoing nurse).  Report included the following information SBAR, Kardex, ED Summary, Intake/Output, MAR, Recent Results and Cardiac Rhythm SB.

## 2017-03-06 NOTE — PROGRESS NOTES
0700: Bedside SBAR report received from [address] [facility]. Labs and plan of care reviewed at this time. Pt stable. ADLs addressed. Call light within reach, bed exit alarm on. Will continue to monitor. 0745: Initial assessment completed. Dr. Krystina Gonzalez at bedside  0900: Wife at bedside  1230: Patient being transferred to cath lab via cath RN   [id number]: Patient transferred back to ICU from cath lab. Patient stable at this time. Patient to remain flat for 2 hours  .

## 2017-03-06 NOTE — PROGRESS NOTES
Cardiology Progress Note IVCU         NAME:  Khanh Soto   :   1945   MRN:   [id number]     Assessment/Plan:   NSTEMI -POA peak trop 15        - ASA,statin, BB       - cath today        - Refer patient to phase II outpatient cardiac rehab @ [address]       - f/u Dr Conde He  Hx PAF- remains SR on BB and CCB PTA       - cont PTA doses       - [id number] score 3 placing him at 4.3 5 annual risk of embolic stroke- he is aware when he is in afib and decision was made to not use anticoag   HTN- BP OK  S/p MV repair- obtain records from Dr Francis Pham: 35 minutes spent reviewing data, dx, treatment w/ pt   coordinating care with team including care management, and  Obtaining records. Cardiology Attending:    Patient personally seen and examined. All the elements of history and examination were personally performed. Assessment and plan was discussed and agree as written above. Kareem Cartagena MD, [facility]       Guy's tests shows bilateral circulation           Subjective:   Khanh Soto is a 80y.o. year old male w/ hx: HTN cx by PAF (last episode summer 2016- BB and CCB) , and valvular heart disease s/p MV repair  PW:  chest pain- onset night PTA  while walking the dog, felt chest heaviness, anterior retrosternal region, radiating to the right, mild SOB, continous. No palpitations, diaphoresis. Tx w/ ASA. No NTG. Pain spontanously subsided. EKG non ischemic with PVC and couplet. Trop positive and rising.    Trop: peak 15    Overnight activities reviewed:    - -119/77   - Tele sinus mae    -  K 3.9   Mg++,     Cr 0.81 glucose 109    -  Trop better 9.64              -  HDL 64, LDL 76, TG 66    - UO: 1200      I/O -100/24,        Cardiac ROS:Patient denies any exertional chest pain, dyspnea, palpitations, syncope, orthopnea, edema or paroxysmal nocturnal dyspnea. Review of Systems: No nausea, indigestion, vomiting, pain, cough, sputum. No bleeding. NPO, up inroom. CARDIAC EVALUATION   ECHO 3/6/2017: EF 60%, mild HK basal-mid inferolateral wall. Mod LAE, mild MAC, prioe MVR,  mild MR, MI, TR        Objective:     Visit Vitals    /66    Pulse (!) 53    Temp 98 °F (36.7 °C)    Resp 16    Ht 6' (1.829 m)    Wt 186 lb 8.2 oz (84.6 kg)    SpO2 95%    BMI 25.3 kg/m2        O2 Device: Room air    Temp (24hrs), Av.9 °F (36.6 °C), Min:97.8 °F (36.6 °C), Max:98 °F (36.7 °C)        Intake/Output Summary (Last 24 hours) at 17 0748  Last data filed at 17 0745   Gross per 24 hour   Intake           1352.5 ml   Output             1825 ml   Net           -472.5 ml       TELE:  SR     General: AAOx3 cooperative, no acute distress. HEENT: Atraumatic. Pink  and moist.  Anicteric sclerae. Neck: Supple,     Lungs: CTA bilaterally. No wheezing/rhonchi/crackles. Heart: PMI: nondisplaced, medians sternotomy scar,   Regular rhythm, no murmur, no S3, no rubs, no gallops. No JVD. No carotid bruits. Abdomen: Soft, non-distended, non-tender. + Bowel sounds. No bruits. : voiding   Extremities: No edema, no clubbing, no cyanosis. No calf tenderness  Groin:  No bruit   Neurologic: Grossly intact. Alert and oriented X 3. No acute neurological distress. Psych: Good insight. Not anxious nor agitated.       Care Plan discussed with:    Comments   Patient y    Family      RN y    Care Manager                    Consultant:          Data Review:   CMP:   Lab Results   Component Value Date/Time     2017 03:39 AM    K 3.9 2017 03:39 AM     (H) 2017 03:39 AM    CO2 25 2017 03:39 AM    AGAP 8 2017 03:39 AM     (H) 2017 03:39 AM    BUN 16 2017 03:39 AM    CREA 0.81 2017 03:39 AM    GFRAA >60 2017 03:39 AM    GFRNA >60 2017 03:39 AM    CA 8.2 (L) 2017 03:39 AM    MG 1.9 2017 03:39 AM     CBC:   Lab Results   Component Value Date/Time    WBC 7.2 2017 03:39 AM    HGB 13.0 03/06/2017 03:39 AM    HCT 39.6 03/06/2017 03:39 AM     03/06/2017 03:39 AM     All Cardiac Markers in the last 24 hours:   Lab Results   Component Value Date/Time     (H) 03/05/2017 10:02 AM    TROIQ 9.64 (H) 03/05/2017 10:02 AM     Recent Glucose Results:   Lab Results   Component Value Date/Time     (H) 03/06/2017 03:39 AM     ABG: No results found for: PH, PHI, PCO2, PCO2I, PO2, PO2I, HCO3, HCO3I, FIO2, FIO2I  LIPIDS:  Cholesterol, total   Date Value Ref Range Status   03/05/2017 153 <200 MG/DL Final     Triglyceride   Date Value Ref Range Status   03/05/2017 66 <150 MG/DL Final     Comment:     Based on NCEP-ATP III:  Triglycerides <150 mg/dL  is considered normal, 150-199 mg/dL  borderline high,  200-499 mg/dL high and  greater than or equal to 500 mg/dL very high. HDL Cholesterol   Date Value Ref Range Status   03/05/2017 64 MG/DL Final     Comment:     Based on NCEP ATP III, HDL Cholesterol <40 mg/dL is considered low and >60 mg/dL is elevated.      LDL, calculated   Date Value Ref Range Status   03/05/2017 75.8 0 - 100 MG/DL Final     Comment:     Based on the NCEP-ATP: LDL-C concentrations are considered  optimal <100 mg/dL,  near optimal/above Normal 100-129 mg/dL  Borderline High: 130-159, High: 160-189 mg/dL  Very High: Greater than or equal to 190 mg/dL       VLDL, calculated   Date Value Ref Range Status   03/05/2017 13.2 MG/DL Final     CHOL/HDL Ratio   Date Value Ref Range Status   03/05/2017 2.4 0 - 5.0   Final       Medications reviewed  Current Facility-Administered Medications   Medication Dose Route Frequency    aspirin chewable tablet 81 mg  81 mg Oral DAILY    fluticasone (FLONASE) 50 mcg/actuation nasal spray 2 Spray  2 Spray Both Nostrils DAILY    0.9% sodium chloride infusion  75 mL/hr IntraVENous CONTINUOUS    enoxaparin (LOVENOX) injection 80 mg  1 mg/kg SubCUTAneous Q12H    sodium chloride (NS) flush 5-10 mL  5-10 mL IntraVENous Q8H    sodium chloride (NS) flush 5-10 mL  5-10 mL IntraVENous PRN    acetaminophen (TYLENOL) tablet 650 mg  650 mg Oral Q4H PRN    oxyCODONE-acetaminophen (PERCOCET) 5-325 mg per tablet 1 Tab  1 Tab Oral Q4H PRN    HYDROmorphone (PF) (DILAUDID) injection 0.5 mg  0.5 mg IntraVENous Q4H PRN    zolpidem (AMBIEN) tablet 5 mg  5 mg Oral QHS PRN    prochlorperazine (COMPAZINE) with saline injection 5 mg  5 mg IntraVENous Q6H PRN    influenza vaccine 2016-17 (36mos+)(PF) (FLUZONE/FLUARIX/FLULAVAL QUAD) injection 0.5 mL  0.5 mL IntraMUSCular PRIOR TO DISCHARGE    benzonatate (TESSALON) capsule 100 mg  100 mg Oral TID PRN    dicyclomine (BENTYL) tablet 20 mg  20 mg Oral BID PRN    dilTIAZem CD (CARDIZEM CD) capsule 240 mg  240 mg Oral QHS    lisinopril (PRINIVIL, ZESTRIL) tablet 10 mg  10 mg Oral QHS    metoprolol tartrate (LOPRESSOR) tablet 25 mg  25 mg Oral BID    pantoprazole (PROTONIX) tablet 40 mg  40 mg Oral ACB    ALPRAZolam (XANAX) tablet 0.25 mg  0.25 mg Oral BID PRN    Or    ALPRAZolam (XANAX) tablet 0.5 mg  0.5 mg Oral BID PRN         Vijaya Grey, RN, ACNP-BC, [facility]

## 2017-03-06 NOTE — DISCHARGE INSTRUCTIONS
[address] [facility] Office: [address] [facility], [address] [place] [address]      [address] [facility] office: [facility]                              [phone]    Patient Discharge Instructions    Trenton Wang / [id number] : 1945    Admitted 3/4/2017 Discharged: 3/6/2017   Cardiologist:  Dr. Faith Peguero     PCP: Robe Mart MD      Procedures/Test:CATH: no coronary artery disease     · It is important that you take the medication exactly as they are prescribed. · Keep your medication in the bottles provided by the pharmacist and keep a list of the medication names, dosages, and times to be taken in your wallet. · Do not take other medications without consulting your doctor. BRING ALL of YOUR MEDICINES TO YOUR OFFICE VISIT with .     Cardiac Catheterization  Discharge Instructions  *Check the puncture site frequently for swelling or bleeding. If you see any bleeding, lie down and apply pressure over the area with a clean town or washcloth. Notify your doctor for any redness, swelling, drainage or oozing from the puncture site. Notify your doctor for any fever or chills. *If the leg or arm with the puncture becomes cold, numb or painful, call Dr Day Pena     *Activity should be limited for the next 48 hours. Climb stairs as little as possible and avoid any stooping, bending or strenuous activity for 48 hours. No strenuous activity or heavy lifting over 10 lbs. for 7 days. · You may take a shower 24 hours after your cardiac catheterization. Be sure to get the dressing wet and then remove it; gently wash the area with warm soapy water. Pat dry and leave open to air. To help prevent infections, be sure to keep the cath site clean and dry. No lotions, creams, powders, ointments, etc. in the cath site for approximately 1 week.     · Do not take a tub bath, get in a hot tub or swimming pool for approximately 5 days or until the cath site is completely healed. · Drink plenty of fluids for 24-48 hours after your cath to flush the contrast dye from your kidneys. No alcoholic beverages for 24 hours. You may resume your previous diet (low fat, low cholesterol) after your cath. · Do not drive or operate heavy machinery for 48 hours. · You may resume your usual diet. Drink more fluids than usual.  · Have a responsible person drive you home and stay with you for at least 24 hours after your heart catheterization/angiography. · *After your cath, some bruising or discomfort is common during the healing process. Tylenol, 1-2 tablets every 6 hours as needed, is recommended if you experience any discomfort. If you experience any signs or symptoms of infection such as fever, chills, or poorly healing incision, persistent tenderness or swelling in the groin, redness and/or warmth to the touch, numbness, significant tingling or pain at the groin site or affected extremity, rash, drainage from the cath site, or if the leg feels tight or swollen, call your physician right away.  If bleeding at the cath site occurs, take a clean gauze pad and apply direct pressure to the groin just above the puncture site. Call 911 immediately, and continue to apply direct pressure until an ambulance gets to your location.  You may return to work  2  days after your cardiac cath if no groin bleeding. Activity: Resume normal activity letting fatigue be the guide. Do not lift over 10 pounds for 7 days. Diet: American Heart association, low fat, 1500 mg sodium  Diabetic. Call for or return to ER for recurrent or prolonged chest pain, shortness of breath, lightheadedness, dizzy, palpitations, passing out, swelling in the legs or abdomen, pain or swelling  At the cath site.      Lifestyle changes   Eat a heart-healthy diet that is low in cholesterol, saturated fat, and salt, and is full of fruits, vegetables and whole-grains. Eat at least two servings of fish each week. You may get more details about how to eat healthy, but these tips can help you get started. Www.[facility].[employer]  When should you call for help? Call 911 anytime you think you may need emergency care. For example, call if:  You have signs of a heart attack. These may include:  Chest pain or pressure. Sweating. Shortness of breath. Nausea or vomiting. Pain that spreads from the chest to the neck, jaw, or one or both shoulders or arms. Dizziness or lightheadedness. A fast or irregular pulse. After calling 911, chew 1 adult-strength aspirin. Wait for an ambulance. Do not try to drive yourself. You passed out (lost consciousness). You feel like you are having another heart attack. Call your doctor now or seek immediate medical care if:  You have had any chest pain, even if it has gone away. You have new or increased shortness of breath. You are dizzy or lightheaded, or you feel like you may faint. Watch closely for changes in your health, and be sure to contact your doctor if you have any problem      Information obtained by :  I understand that if any problems occur once I am at home I am to contact my physician. I understand and acknowledge receipt of the instructions indicated above.                                                                                                                                            R.N.'s Signature                                                                  Date/Time                                                                                                                                              Patient or Representative Signature                                                          Date/Time    HOSPITALIST DISCHARGE INSTRUCTIONS  NAME: Carolyn Eugene   :  1945   MRN:  [id number] Date/Time:  3/6/2017 2:52 PM    ADMIT DATE: 3/4/2017     DISCHARGE DATE: 3/6/2017     ADMITTING DIAGNOSIS:  Elevated troponin   Possibly small vessel disease v. Viral myocarditis     DISCHARGE DIAGNOSIS:  As above     MEDICATIONS:     · It is important that you take the medication exactly as they are prescribed. · Keep your medication in the bottles provided by the pharmacist and keep a list of the medication names, dosages, and times to be taken in your wallet. · Do not take other medications without consulting your doctor. Pain Management: per above medications    What to do at Home    Recommended diet:  Cardiac Diet    Recommended activity: Activity as tolerated    If you experience any of the following symptoms then please call your primary care physician or return to the emergency room if you cannot get hold of your doctor:  Fever, chills, nausea, vomiting, diarrhea, change in mentation, falling, bleeding, shortness of breath, chest pain     Follow Up:  Dr. Azucena Cardoso MD  you are to call and set up an appointment to see them in 2 weeks. Follow-up Information     Follow up With Details Comments Contact Info    Belem Esparza MD Schedule an appointment as soon as possible for a visit in 1 week  [address] [place] [address]  [address] [facility], [address] [place] [address]  [phone]              Information obtained by :  I understand that if any problems occur once I am at home I am to contact my physician. I understand and acknowledge receipt of the instructions indicated above.                                                                                                                                            Physician's or R.N.'s Signature                                                                  Date/Time Patient or Representative Signature                                                          Date/Time

## 2017-03-06 NOTE — DISCHARGE SUMMARY
Physician Discharge Summary     Patient ID:  Sherman Brittle  [id number]  80 y.o.  1945    Admit date: 3/4/2017    Discharge date and time: 3/6/2017    Admission Diagnoses: NSTEMI (non-ST elevated myocardial infarction) [facility])    Discharge Diagnoses:    Mr. Ramiro Lam is a pleasant 79 yo male with PMH of HTN and a-fib as well as mitral valve repair in the past admitted for NSTEMI. He was started on ASA and therapeutic Lovenox. His troponin peaked at 15. He was taken to the cath lab. No interventions indicated. Suspect small vessel disease. He will be started on plavix for one month in addition to ASA and follow-up with his PCP    HTN - on metoprolol and dilt     A-fib - on metoprolol and dilt; defer NOAC to outpt     PCP: Eleanor Hicks MD     Consults: cardiology    Discharge Exam:  Visit Vitals    /68    Pulse (!) 51    Temp 98.2 °F (36.8 °C)    Resp 16    Ht 6' (1.829 m)    Wt 84.6 kg (186 lb 8.2 oz)    SpO2 96%    BMI 25.3 kg/m2     Gen: Well-developed, well-nourished, in no acute distress  HEENT: Pink conjunctivae, PERRL, hearing intact to voice, moist mucous membranes  Neck: Supple, without masses, thyroid non-tender  Resp: No accessory muscle use, clear breath sounds without wheezes rales or rhonchi  Card: No murmurs, normal S1, S2 without thrills, bruits or peripheral edema  Abd: Soft, non-tender, non-distended, normoactive bowel sounds are present  Musc: No cyanosis or clubbing  Skin: No rashes or ulcers, skin turgor is good  Neuro: Cranial nerves 3-12 are grossly intact,  strength is 5/5 bilaterally and dorsi / plantarflexion is 5/5 bilaterally, follows commands appropriately  Psych: Good insight, oriented to person, place and time, alert    Disposition: home    Patient Instructions:   Current Discharge Medication List      START taking these medications    Details   aspirin 81 mg chewable tablet Take 1 Tab by mouth daily.   Qty: 30 Tab, Refills: 0      clopidogrel (PLAVIX) 75 mg tab Take 1 Tab by mouth daily. Qty: 30 Tab, Refills: 0         CONTINUE these medications which have NOT CHANGED    Details   dilTIAZem CD (CARDIZEM CD) 240 mg ER capsule Take 240 mg by mouth nightly. pantoprazole (PROTONIX) 40 mg tablet Take 40 mg by mouth Daily (before breakfast). Indications: GASTROESOPHAGEAL REFLUX      ALPRAZolam (XANAX) 0.5 mg tablet Take 0.25-0.5 mg by mouth two (2) times daily as needed for Anxiety (Patient reports anxiety attacks). lisinopril (PRINIVIL, ZESTRIL) 10 mg tablet Take 10 mg by mouth nightly. metoprolol tartrate (LOPRESSOR) 25 mg tablet Take 25 mg by mouth two (2) times a day. dicyclomine (BENTYL) 20 mg tablet Take 20 mg by mouth two (2) times daily as needed. benzonatate (TESSALON PERLES) 100 mg capsule Take 100 mg by mouth three (3) times daily as needed for Cough. Phenylephrine-DM-Acetaminophen (VICKS DAYQUIL COLD-FLU RELIEF) 5- mg/15 mL liqd Take 15 mL by mouth daily as needed. oxymetazoline (AFRIN, OXYMETAZOLINE,) 0.05 % nasal spray 2 Sprays by Both Nostrils route daily as needed for Congestion. doxylamine-dm (VICKS NYQUIL COUGH) 6.25-15 mg/15 mL soln Take 15 mL by mouth nightly as needed.          STOP taking these medications       azithromycin (ZITHROMAX) 250 mg tablet Comments:   Reason for Stopping:             Activity: as tolerated  Diet: cardiac  Wound Care: none    Follow-up with Azucena Cardoso MD in 1-2 weeks     Approximate time spent in patient care on day of discharge: 33 minutes     Signed:  Quoc Rai MD  3/6/2017  2:53 PM

## 2017-03-06 NOTE — PROGRESS NOTES
[address]       INTENSIVIST DAILY PROGRESS NOTE  Name: Eulogio Correa   : 1945   MRN: [id number]   Date: 3/6/2017 10:16 AM     I have reviewed the flowsheet and previous days notes. Patient reports feeling well. No further CP or palpitations. Denies dyspnea, LE swelling, N/V, diaphoresis. Overnight events reviewed:  · NAEO    Vital Signs:    Visit Vitals    /66    Pulse (!) 53    Temp 98 °F (36.7 °C)    Resp 16    Ht 6' (1.829 m)    Wt 186 lb 8.2 oz (84.6 kg)    SpO2 95%    BMI 25.3 kg/m2       O2 Device: Room air       Temp (24hrs), Av.9 °F (36.6 °C), Min:97.8 °F (36.6 °C), Max:98 °F (36.7 °C)       Intake/Output:   Last shift:       07 -  1900  In: 407.5 [I.V.:407.5]  Out: -   Last 3 shifts:  190 -  0700  In: 1821.3 [P.O.:660; I.V.:1161.3]  Out: 3045 [Urine:2605]    Intake/Output Summary (Last 24 hours) at 17 1016  Last data filed at 17 0745   Gross per 24 hour   Intake           1127.5 ml   Output             1225 ml   Net            -97.5 ml       Hemodynamics:   PAP:     Wedge:     CVP:      CO:     CI:     SVR:     PVR:        Physical Exam:  General:  NAD, alert and conversant   Head:  Normocephalic, without obvious abnormality, atraumatic. Eyes:  Conjunctivae/corneas clear. PERRL. Nose: Nares normal. Septum midline. Mucosa normal. No drainage. Throat: Lips, mucosa, and tongue normal. Teeth and gums normal.    Neck: Supple, symmetrical, trachea midline, no adenopathy, no carotid bruit, and no JVD   Lungs:   Clear to auscultation bilaterally. Heart:  Regular rhythm, bradycardic, S1, S2 normal, no murmur, click, rub or gallop. Abdomen:   Soft, non-tender. Bowel sounds normal. No masses,  No organomegaly. Extremities: Extremities normal, atraumatic, no cyanosis or edema. Pulses: 2+ and symmetric all extremities.    Skin: Skin color, texture, turgor normal.   Neurologic: Grossly nonfocal     DATA: Current Facility-Administered Medications   Medication Dose Route Frequency    dilTIAZem CD (CARDIZEM CD) capsule 240 mg  240 mg Oral QHS    aspirin chewable tablet 81 mg  81 mg Oral DAILY    fluticasone (FLONASE) 50 mcg/actuation nasal spray 2 Spray  2 Spray Both Nostrils DAILY    0.9% sodium chloride infusion  75 mL/hr IntraVENous CONTINUOUS    enoxaparin (LOVENOX) injection 80 mg  1 mg/kg SubCUTAneous Q12H    sodium chloride (NS) flush 5-10 mL  5-10 mL IntraVENous Q8H    sodium chloride (NS) flush 5-10 mL  5-10 mL IntraVENous PRN    acetaminophen (TYLENOL) tablet 650 mg  650 mg Oral Q4H PRN    oxyCODONE-acetaminophen (PERCOCET) 5-325 mg per tablet 1 Tab  1 Tab Oral Q4H PRN    HYDROmorphone (PF) (DILAUDID) injection 0.5 mg  0.5 mg IntraVENous Q4H PRN    zolpidem (AMBIEN) tablet 5 mg  5 mg Oral QHS PRN    prochlorperazine (COMPAZINE) with saline injection 5 mg  5 mg IntraVENous Q6H PRN    influenza vaccine 2016-17 (36mos+)(PF) (FLUZONE/FLUARIX/FLULAVAL QUAD) injection 0.5 mL  0.5 mL IntraMUSCular PRIOR TO DISCHARGE    benzonatate (TESSALON) capsule 100 mg  100 mg Oral TID PRN    dicyclomine (BENTYL) tablet 20 mg  20 mg Oral BID PRN    lisinopril (PRINIVIL, ZESTRIL) tablet 10 mg  10 mg Oral QHS    metoprolol tartrate (LOPRESSOR) tablet 25 mg  25 mg Oral BID    pantoprazole (PROTONIX) tablet 40 mg  40 mg Oral ACB    ALPRAZolam (XANAX) tablet 0.25 mg  0.25 mg Oral BID PRN    Or    ALPRAZolam (XANAX) tablet 0.5 mg  0.5 mg Oral BID PRN             Labs:  Recent Labs      03/06/17   0339  03/05/17   0527 03/04/17   1525   WBC  7.2  6.4  11.2*   HGB  13.0  13.0  14.5   HCT  39.6  41.0  43.3   PLT  161  174  215     Recent Labs      03/06/17   0339  03/05/17 0527  03/04/17   1525   NA  142  142  140   K  3.9  4.5  3.5   CL  109*  109*  105   CO2  25  24  21   GLU  109*  98  95   BUN  16  17  21*   CREA  0.81  0.82  1.12   CA  8.2*  8.5  9.6   MG  1.9  2.0   --    PHOS   --   4.0   --      No results for input(s): PH, PCO2, PO2, HCO3, FIO2 in the last 72 hours. Imaging:  I have personally reviewed the patients radiographs and reports. Echo 3/4/17  SUMMARY:  Left ventricle: Size was normal. Systolic function was preserved. Ejection  fraction was estimated to be 60 %. There was mild hypokinesis of the  basal-mid inferolateral wall(s). Left atrium: The atrium was moderately dilated. Mitral valve: There was mild annular calcification. Prior repair  procedures: surgical repair There was mild regurgitation. Regurgitation  grade was 1+ on a scale of 0 to 4+. Aortic valve: Regurgitation grade was 1+ on a scale of 0 to 4+. Tricuspid valve: There was mild regurgitation. Regurgitation grade was 1+  on a scale of 0 to 4+. IMPRESSION:   · NSTEMI, troponins have plateaued  · PAF likely resolved following MV repair  · MVR, ok on echo  · Allergic rhinitis   PLAN:   · Cath today, NPO  · Continue ASA, statin, BB, CCB, ACEi  · Continue home protonix  · Replete Mg K today  · NS at 75cc/hr while NPO  · flonase  · Daily PT/OT  · Nutrition   GLOBAL ISSUES:   · GI Prophylaxis: none, home PO protonix  · DVT Prophylaxis: holding for cath, s/p tx dose lovenox  · Lines: R PIV AC 20G day 1  · Medical Decision Maker: self     The pt is critically ill.     See my orders for details    My assessment/plan was discussed with: Dr. Yecenia Tyson (attending physician), nurse, pt's family      Fely Cordero MD  Family Medicine Resident

## 2019-02-25 ENCOUNTER — HOSPITAL ENCOUNTER (OUTPATIENT)
Dept: NON INVASIVE DIAGNOSTICS | Age: 74
Discharge: HOME | End: 2019-02-25
Attending: SURGERY
Payer: MEDICARE

## 2019-02-25 ENCOUNTER — HOSPITAL ENCOUNTER (OUTPATIENT)
Dept: PREADMISSION TESTING | Age: 74
Discharge: HOME | End: 2019-02-25
Payer: MEDICARE

## 2019-02-25 VITALS
OXYGEN SATURATION: 99 % | WEIGHT: 181.38 LBS | DIASTOLIC BLOOD PRESSURE: 62 MMHG | BODY MASS INDEX: 24.57 KG/M2 | HEART RATE: 47 BPM | TEMPERATURE: 97.4 F | SYSTOLIC BLOOD PRESSURE: 135 MMHG | HEIGHT: 72 IN | RESPIRATION RATE: 18 BRPM

## 2019-02-25 LAB
ANION GAP SERPL CALC-SCNC: 10 MMOL/L (ref 5–15)
ATRIAL RATE: 48 BPM
BUN SERPL-MCNC: 20 MG/DL (ref 6–20)
BUN/CREAT SERPL: 20 (ref 12–20)
CALCIUM SERPL-MCNC: 9.2 MG/DL (ref 8.5–10.1)
CALCULATED R AXIS, ECG10: -58 DEGREES
CALCULATED T AXIS, ECG11: -55 DEGREES
CHLORIDE SERPL-SCNC: 106 MMOL/L (ref 97–108)
CO2 SERPL-SCNC: 26 MMOL/L (ref 21–32)
CREAT SERPL-MCNC: 0.99 MG/DL (ref 0.7–1.3)
DIAGNOSIS, 93000: NORMAL
GLUCOSE SERPL-MCNC: 91 MG/DL (ref 65–100)
P-R INTERVAL, ECG05: 234 MS
POTASSIUM SERPL-SCNC: 4.4 MMOL/L (ref 3.5–5.1)
Q-T INTERVAL, ECG07: 490 MS
QRS DURATION, ECG06: 134 MS
QTC CALCULATION (BEZET), ECG08: 437 MS
SODIUM SERPL-SCNC: 142 MMOL/L (ref 136–145)
VENTRICULAR RATE, ECG03: 48 BPM

## 2019-02-25 PROCEDURE — 36415 COLL VENOUS BLD VENIPUNCTURE: CPT

## 2019-02-25 PROCEDURE — 93005 ELECTROCARDIOGRAM TRACING: CPT

## 2019-02-25 PROCEDURE — 80048 BASIC METABOLIC PNL TOTAL CA: CPT

## 2019-02-25 RX ORDER — ALPRAZOLAM 1 MG/1
0.5 TABLET ORAL
COMMUNITY

## 2019-02-25 NOTE — PERIOP NOTES
[address] [address] [facility]                            MAIN OR                                  [phone]   MAIN PRE OP                          [phone]                                                                                AMBULATORY PRE OP          [phone]  PRE-ADMISSION TESTING    [phone]     Surgery Date:   Thursday 3/719         Is surgery arrival time given by surgeon? NO  If NO, [facility] staff will call you (Wednesday) between 3 and 7pm the day before your surgery with your arrival time. (If your surgery is on a Monday, we will call you the Friday before.)    Call [phone] after 7pm Monday-Friday if you did not receive your arrival time. INSTRUCTIONS BEFORE YOUR SURGERY   When You  Arrive   Arrive at the 2nd [facility] on the day of your surgery  Have your insurance card, photo ID, and any copayment (if needed)     Food   and   Drink   NO food or drink after midnight the night before surgery    This means NO water, gum, mints, coffee, juice, etc.  No alcohol (beer, wine, liquor) 24 hours before and after surgery     Medications to   TAKE   Morning of Surgery 1) home medications reviewed and verified with patient during PAT encounter:      2) MEDICATIONS TO TAKE THE MORNING OF SURGERY WITH A SIP OF WATER:    Cardizem, Metoprolol, Pantoprazole     Medications  To  STOP      7 days before surgery    Non-Steroidal anti-inflammatory Drugs (NSAID's): for example, Ibuprofen (Advil, Motrin), Naproxen (Aleve)   Aspirin, if taking for pain    Herbal supplements, vitamins, and fish oil     Blood  Thinners  If you take  Aspirin, Plavix, Coumadin, or any blood-thinning or anti-blood clot medicine, talk to the doctor who prescribed the medications for pre-operative instructions.      Bathing Clothing  Jewelry  Valuables       If you shower the morning of surgery, please do not apply anything to your skin (lotions, powders, deodorant, or makeup, especially mascara)   Do not shave or trim anywhere 24 hours before surgery   Wear your hair loose or down; no pony-tails, buns, or metal hair clips   Wear loose, comfortable, clean clothes   Wear glasses instead of contacts   Leave money, valuables, and jewelry, including body piercings, at home     Going Home       or Spending the Night    SAME-DAY SURGERY: You must have a responsible adult drive you home and stay with you 24 hours after surgery   ADMITS: If your doctor is keeping you into the hospital after surgery, leave personal belongings/luggage in your car until you have a hospital room number. Hospital discharge time is 12 noon  Drivers must be here before 12 noon unless you are told differently   Special Instructions Free  parking 7a-5p. Bring completed medication list on day of surgery. Follow all instructions so your surgery wont be cancelled. Please, be on time. If a situation occurs and you are delayed the day of surgery, call [phone] or          [phone]. If your physical condition changes (like a fever, cold, flu, etc.) call your surgeon. The patient was contacted  in person. Home medication reviewed and verified during PAT appointment. The patient verbalizes understanding of all instructions and does not  need reinforcement.

## 2019-02-26 NOTE — PERIOP NOTES
Message left for Dr. Roel Winston office to request that H&P be refaxed. Call placed to Dr. Nadia Spence office requesting last OV notes and EKG. Spoke with Katy Hoang who states that xarelto plan will be returned to PAT this afternoon. H&P received from Dr. Roel Winston office and placed on chart. Message left for patient requesting call back to discuss instructions for xarelto. Call placed to Dr. Nadia Spence office requesting last OV notes and EKG. Call placed to Dr. Nadia Spence office to request CTA results. Patients history, medications, and EKGs reviewed with anesthesia, okay to proceed with surgery.

## 2019-02-27 NOTE — PERIOP NOTES
Spoke with patient to inform to stop Xarelto 2 days prior to surgery per Dr. Job Trejo. Verbalized understanding.  Informed that surgeon would give him restart date for [facility]

## 2019-03-06 ENCOUNTER — ANESTHESIA EVENT (OUTPATIENT)
Dept: SURGERY | Age: 74
End: 2019-03-06
Payer: MEDICARE

## 2019-03-07 ENCOUNTER — HOSPITAL ENCOUNTER (OUTPATIENT)
Age: 74
Setting detail: OUTPATIENT SURGERY
Discharge: HOME | End: 2019-03-07
Attending: SURGERY | Admitting: SURGERY
Payer: MEDICARE

## 2019-03-07 ENCOUNTER — ANESTHESIA (OUTPATIENT)
Dept: SURGERY | Age: 74
End: 2019-03-07
Payer: MEDICARE

## 2019-03-07 VITALS
TEMPERATURE: 97.5 F | HEART RATE: 60 BPM | DIASTOLIC BLOOD PRESSURE: 77 MMHG | OXYGEN SATURATION: 98 % | WEIGHT: 181.22 LBS | SYSTOLIC BLOOD PRESSURE: 125 MMHG | BODY MASS INDEX: 24.55 KG/M2 | HEIGHT: 72 IN | RESPIRATION RATE: 13 BRPM

## 2019-03-07 DIAGNOSIS — K40.20 NON-RECURRENT BILATERAL INGUINAL HERNIA WITHOUT OBSTRUCTION OR GANGRENE: Primary | ICD-10-CM

## 2019-03-07 PROCEDURE — 77030008684 HC TU ET CUF COVD -B: Performed by: ANESTHESIOLOGY

## 2019-03-07 PROCEDURE — 74011000250 HC RX REV CODE- 250

## 2019-03-07 PROCEDURE — 77030013079 HC BLNKT BAIR HGGR 3M -A: Performed by: ANESTHESIOLOGY

## 2019-03-07 PROCEDURE — 74011000250 HC RX REV CODE- 250: Performed by: SURGERY

## 2019-03-07 PROCEDURE — 76010000149 HC OR TIME 1 TO 1.5 HR: Performed by: SURGERY

## 2019-03-07 PROCEDURE — 93005 ELECTROCARDIOGRAM TRACING: CPT

## 2019-03-07 PROCEDURE — 77030032490 HC SLV COMPR SCD KNE COVD -B: Performed by: SURGERY

## 2019-03-07 PROCEDURE — C9290 INJ, BUPIVACAINE LIPOSOME: HCPCS | Performed by: SURGERY

## 2019-03-07 PROCEDURE — 76210000026 HC REC RM PH II 1 TO 1.5 HR: Performed by: SURGERY

## 2019-03-07 PROCEDURE — 77030037032 HC INSRT SCIS CLICKLLINE DISP STOR -B: Performed by: SURGERY

## 2019-03-07 PROCEDURE — 77030011640 HC PAD GRND REM COVD -A: Performed by: SURGERY

## 2019-03-07 PROCEDURE — 77030018836 HC SOL IRR NACL ICUM -A: Performed by: SURGERY

## 2019-03-07 PROCEDURE — 77030026438 HC STYL ET INTUB CARD -A: Performed by: ANESTHESIOLOGY

## 2019-03-07 PROCEDURE — 77030019908 HC STETH ESOPH SIMS -A: Performed by: ANESTHESIOLOGY

## 2019-03-07 PROCEDURE — 74011250636 HC RX REV CODE- 250/636: Performed by: SURGERY

## 2019-03-07 PROCEDURE — 77030018684: Performed by: SURGERY

## 2019-03-07 PROCEDURE — 77030034850: Performed by: SURGERY

## 2019-03-07 PROCEDURE — 77030008606 HC TRCR ENDOSC KII AMR -B: Performed by: SURGERY

## 2019-03-07 PROCEDURE — 76060000033 HC ANESTHESIA 1 TO 1.5 HR: Performed by: SURGERY

## 2019-03-07 PROCEDURE — 74011250636 HC RX REV CODE- 250/636: Performed by: ANESTHESIOLOGY

## 2019-03-07 PROCEDURE — C1781 MESH (IMPLANTABLE): HCPCS | Performed by: SURGERY

## 2019-03-07 PROCEDURE — 76210000006 HC OR PH I REC 0.5 TO 1 HR: Performed by: SURGERY

## 2019-03-07 PROCEDURE — C1727 CATH, BAL TIS DIS, NON-VAS: HCPCS | Performed by: SURGERY

## 2019-03-07 PROCEDURE — 74011250637 HC RX REV CODE- 250/637: Performed by: SURGERY

## 2019-03-07 PROCEDURE — 74011250636 HC RX REV CODE- 250/636

## 2019-03-07 PROCEDURE — 77030020747 HC TU INSUF ENDOSC TELE -A: Performed by: SURGERY

## 2019-03-07 PROCEDURE — 77030031139 HC SUT VCRL2 J&J -A: Performed by: SURGERY

## 2019-03-07 PROCEDURE — 77030020782 HC GWN BAIR PAWS FLX 3M -B

## 2019-03-07 PROCEDURE — 77030002933 HC SUT MCRYL J&J -A: Performed by: SURGERY

## 2019-03-07 DEVICE — 3DMAX MESH, 10.8 CM X 16.0 CM (4.3" X 6.3"), LEFT, LARGE
Type: IMPLANTABLE DEVICE | Site: GROIN | Status: FUNCTIONAL
Brand: 3DMAX

## 2019-03-07 DEVICE — 3DMAX MESH, 10.8 CM X 16.0 CM (4.3" X 6.3"), RIGHT, LARGE
Type: IMPLANTABLE DEVICE | Site: GROIN | Status: FUNCTIONAL
Brand: 3DMAX

## 2019-03-07 RX ORDER — SODIUM CHLORIDE, SODIUM LACTATE, POTASSIUM CHLORIDE, CALCIUM CHLORIDE 600; 310; 30; 20 MG/100ML; MG/100ML; MG/100ML; MG/100ML
125 INJECTION, SOLUTION INTRAVENOUS CONTINUOUS
Status: DISCONTINUED | OUTPATIENT
Start: 2019-03-07 | End: 2019-03-07 | Stop reason: HOSPADM

## 2019-03-07 RX ORDER — HYDROMORPHONE HYDROCHLORIDE 1 MG/ML
.25-1 INJECTION, SOLUTION INTRAMUSCULAR; INTRAVENOUS; SUBCUTANEOUS
Status: DISCONTINUED | OUTPATIENT
Start: 2019-03-07 | End: 2019-03-07 | Stop reason: HOSPADM

## 2019-03-07 RX ORDER — OXYCODONE AND ACETAMINOPHEN 5; 325 MG/1; MG/1
1 TABLET ORAL
Qty: 30 TAB | Refills: 0 | Status: SHIPPED | OUTPATIENT
Start: 2019-03-07 | End: 2019-03-12

## 2019-03-07 RX ORDER — CEFAZOLIN SODIUM/WATER 2 G/20 ML
2 SYRINGE (ML) INTRAVENOUS ONCE
Status: COMPLETED | OUTPATIENT
Start: 2019-03-07 | End: 2019-03-07

## 2019-03-07 RX ORDER — MIDAZOLAM HYDROCHLORIDE 1 MG/ML
INJECTION, SOLUTION INTRAMUSCULAR; INTRAVENOUS AS NEEDED
Status: DISCONTINUED | OUTPATIENT
Start: 2019-03-07 | End: 2019-03-07 | Stop reason: HOSPADM

## 2019-03-07 RX ORDER — FENTANYL CITRATE 50 UG/ML
INJECTION, SOLUTION INTRAMUSCULAR; INTRAVENOUS AS NEEDED
Status: DISCONTINUED | OUTPATIENT
Start: 2019-03-07 | End: 2019-03-07 | Stop reason: HOSPADM

## 2019-03-07 RX ORDER — ROCURONIUM BROMIDE 10 MG/ML
INJECTION, SOLUTION INTRAVENOUS AS NEEDED
Status: DISCONTINUED | OUTPATIENT
Start: 2019-03-07 | End: 2019-03-07 | Stop reason: HOSPADM

## 2019-03-07 RX ORDER — KETOROLAC TROMETHAMINE 30 MG/ML
INJECTION, SOLUTION INTRAMUSCULAR; INTRAVENOUS AS NEEDED
Status: DISCONTINUED | OUTPATIENT
Start: 2019-03-07 | End: 2019-03-07 | Stop reason: HOSPADM

## 2019-03-07 RX ORDER — NALOXONE HYDROCHLORIDE 0.4 MG/ML
0.2 INJECTION, SOLUTION INTRAMUSCULAR; INTRAVENOUS; SUBCUTANEOUS
Status: DISCONTINUED | OUTPATIENT
Start: 2019-03-07 | End: 2019-03-07 | Stop reason: HOSPADM

## 2019-03-07 RX ORDER — MIDAZOLAM HYDROCHLORIDE 1 MG/ML
2 INJECTION, SOLUTION INTRAMUSCULAR; INTRAVENOUS
Status: DISCONTINUED | OUTPATIENT
Start: 2019-03-07 | End: 2019-03-07 | Stop reason: HOSPADM

## 2019-03-07 RX ORDER — DIPHENHYDRAMINE HYDROCHLORIDE 50 MG/ML
12.5 INJECTION, SOLUTION INTRAMUSCULAR; INTRAVENOUS AS NEEDED
Status: DISCONTINUED | OUTPATIENT
Start: 2019-03-07 | End: 2019-03-07 | Stop reason: HOSPADM

## 2019-03-07 RX ORDER — OXYCODONE AND ACETAMINOPHEN 5; 325 MG/1; MG/1
2 TABLET ORAL
Status: COMPLETED | OUTPATIENT
Start: 2019-03-07 | End: 2019-03-07

## 2019-03-07 RX ORDER — ONDANSETRON 2 MG/ML
INJECTION INTRAMUSCULAR; INTRAVENOUS AS NEEDED
Status: DISCONTINUED | OUTPATIENT
Start: 2019-03-07 | End: 2019-03-07 | Stop reason: HOSPADM

## 2019-03-07 RX ORDER — EPHEDRINE SULFATE 50 MG/ML
INJECTION, SOLUTION INTRAVENOUS AS NEEDED
Status: DISCONTINUED | OUTPATIENT
Start: 2019-03-07 | End: 2019-03-07 | Stop reason: HOSPADM

## 2019-03-07 RX ORDER — FLUMAZENIL 0.1 MG/ML
0.2 INJECTION INTRAVENOUS
Status: DISCONTINUED | OUTPATIENT
Start: 2019-03-07 | End: 2019-03-07 | Stop reason: HOSPADM

## 2019-03-07 RX ORDER — DEXAMETHASONE SODIUM PHOSPHATE 4 MG/ML
INJECTION, SOLUTION INTRA-ARTICULAR; INTRALESIONAL; INTRAMUSCULAR; INTRAVENOUS; SOFT TISSUE AS NEEDED
Status: DISCONTINUED | OUTPATIENT
Start: 2019-03-07 | End: 2019-03-07 | Stop reason: HOSPADM

## 2019-03-07 RX ORDER — GLYCOPYRROLATE 0.2 MG/ML
INJECTION INTRAMUSCULAR; INTRAVENOUS AS NEEDED
Status: DISCONTINUED | OUTPATIENT
Start: 2019-03-07 | End: 2019-03-07 | Stop reason: HOSPADM

## 2019-03-07 RX ORDER — LIDOCAINE HYDROCHLORIDE 10 MG/ML
0.1 INJECTION, SOLUTION EPIDURAL; INFILTRATION; INTRACAUDAL; PERINEURAL AS NEEDED
Status: DISCONTINUED | OUTPATIENT
Start: 2019-03-07 | End: 2019-03-07 | Stop reason: HOSPADM

## 2019-03-07 RX ORDER — LIDOCAINE HYDROCHLORIDE 20 MG/ML
INJECTION, SOLUTION EPIDURAL; INFILTRATION; INTRACAUDAL; PERINEURAL AS NEEDED
Status: DISCONTINUED | OUTPATIENT
Start: 2019-03-07 | End: 2019-03-07 | Stop reason: HOSPADM

## 2019-03-07 RX ORDER — PROPOFOL 10 MG/ML
INJECTION, EMULSION INTRAVENOUS AS NEEDED
Status: DISCONTINUED | OUTPATIENT
Start: 2019-03-07 | End: 2019-03-07 | Stop reason: HOSPADM

## 2019-03-07 RX ADMIN — KETOROLAC TROMETHAMINE 15 MG: 30 INJECTION, SOLUTION INTRAMUSCULAR; INTRAVENOUS at 11:20

## 2019-03-07 RX ADMIN — MIDAZOLAM HYDROCHLORIDE 1 MG: 1 INJECTION, SOLUTION INTRAMUSCULAR; INTRAVENOUS at 10:05

## 2019-03-07 RX ADMIN — OXYCODONE AND ACETAMINOPHEN 2 TABLET: 5; 325 TABLET ORAL at 13:51

## 2019-03-07 RX ADMIN — EPHEDRINE SULFATE 10 MG: 50 INJECTION, SOLUTION INTRAVENOUS at 10:34

## 2019-03-07 RX ADMIN — FENTANYL CITRATE 125 MCG: 50 INJECTION, SOLUTION INTRAMUSCULAR; INTRAVENOUS at 10:12

## 2019-03-07 RX ADMIN — ONDANSETRON 4 MG: 2 INJECTION INTRAMUSCULAR; INTRAVENOUS at 11:16

## 2019-03-07 RX ADMIN — DEXAMETHASONE SODIUM PHOSPHATE 8 MG: 4 INJECTION, SOLUTION INTRA-ARTICULAR; INTRALESIONAL; INTRAMUSCULAR; INTRAVENOUS; SOFT TISSUE at 10:21

## 2019-03-07 RX ADMIN — Medication 2 G: at 10:11

## 2019-03-07 RX ADMIN — GLYCOPYRROLATE 0.1 MG: 0.2 INJECTION INTRAMUSCULAR; INTRAVENOUS at 10:43

## 2019-03-07 RX ADMIN — SODIUM CHLORIDE, SODIUM LACTATE, POTASSIUM CHLORIDE, AND CALCIUM CHLORIDE 125 ML/HR: 600; 310; 30; 20 INJECTION, SOLUTION INTRAVENOUS at 09:04

## 2019-03-07 RX ADMIN — MIDAZOLAM HYDROCHLORIDE 1 MG: 1 INJECTION, SOLUTION INTRAMUSCULAR; INTRAVENOUS at 10:09

## 2019-03-07 RX ADMIN — ROCURONIUM BROMIDE 50 MG: 10 INJECTION, SOLUTION INTRAVENOUS at 10:13

## 2019-03-07 RX ADMIN — LIDOCAINE HYDROCHLORIDE 40 MG: 20 INJECTION, SOLUTION EPIDURAL; INFILTRATION; INTRACAUDAL; PERINEURAL at 10:13

## 2019-03-07 RX ADMIN — PROPOFOL 130 MG: 10 INJECTION, EMULSION INTRAVENOUS at 10:13

## 2019-03-07 RX ADMIN — EPHEDRINE SULFATE 10 MG: 50 INJECTION, SOLUTION INTRAVENOUS at 10:27

## 2019-03-07 RX ADMIN — FENTANYL CITRATE 25 MCG: 50 INJECTION, SOLUTION INTRAMUSCULAR; INTRAVENOUS at 10:55

## 2019-03-07 NOTE — DISCHARGE SUMMARY
Discharge Summary    Patient: Ryan Resendez Sr.               Sex: male          DOA: 3/7/2019  7:45 AM       YOB: 1945      Age:  68 y.o.        LOS:  LOS: 0 days                Discharge Date:      Admission Diagnoses: BILATERAL INGUINAL HERNIAS    Discharge Diagnoses:  Right indirect/direct defect, left indirect defect    Procedure:  Procedure(s):  LAPAROSCOPIC BILATERAL INGUINAL HERNIA REPAIR WITH MESH    Discharge Condition: Good    Hospital Course: Unremarkable operative procedure. Discharge to home in stable condition. Consults: None    Significant Diagnostic Studies: See full electronic record. Discharge Medications:     Current Discharge Medication List      START taking these medications    Details   oxyCODONE-acetaminophen (PERCOCET) 5-325 mg per tablet Take 1 Tab by mouth every four (4) hours as needed for Pain for up to 7 days. Max Daily Amount: 6 Tabs. Qty: 30 Tab, Refills: 0    Associated Diagnoses: Non-recurrent bilateral inguinal hernia without obstruction or gangrene         CONTINUE these medications which have NOT CHANGED    Details   ALPRAZolam (XANAX) 1 mg tablet Take 0.5 mg by mouth nightly as needed for Anxiety. pantoprazole (PROTONIX) 40 mg tablet Take 40 mg by mouth Daily (before breakfast). Indications: GASTROESOPHAGEAL REFLUX      rivaroxaban (XARELTO) 20 mg tab tablet Take 20 mg by mouth nightly. dilTIAZem CD (CARDIZEM CD) 240 mg ER capsule Take 240 mg by mouth daily. lisinopril (PRINIVIL, ZESTRIL) 10 mg tablet Take 10 mg by mouth nightly. metoprolol tartrate (LOPRESSOR) 25 mg tablet Take 50 mg by mouth two (2) times a day. dicyclomine (BENTYL) 20 mg tablet Take 20 mg by mouth two (2) times daily as needed. Activity/Diet/Wound Care: See patient administered discharge instructions.     Follow-up: 2 weeks    Sandra Gallagher MD  [facility]  Office:  [phone]

## 2019-03-07 NOTE — DISCHARGE INSTRUCTIONS
POST-OPERATIVE INSTRUCTIONS  OUTPATIENT SURGERY LAPAROSCOPIC HERNIA REPAIR    Today you underwent hernia repair which is usually well tolerated. However, below is a list of instructions which are important for you to follow. If you have any questions, please feel free to call the [facility] office. 1. EATING: Please eat lightly when you go home today for the first 24 hours. You may resume your regular diet after that. 2. EXERCISE: Limit your exercise for the first week, although stairs or other walking is fine. 3. DRESSING: You may shower in 24 hours; the clear dressing can get wet. 48 hours after your surgery, the top clear bandage may be removed and left undressed or covered with a lighter dressing. Do not bathe in a tub or pool for at least 4 weeks. 4. NO LIFTING: Until you have seen your surgeon in his/her office following surgery, at which time you will receive further instructions. 5. DRIVING: No driving for at least 48 hours postoperatively. When you are able to tolerate post-surgical pain WITHOUT the use of narcotics and manage the car without increased pain or restriction you may drive. If your vehicle requires you to pull or hoist yourself into the vehicle, driving that vehicle is not advised. You may ride as a passenger anytime. Otherwise, wait until the follow up visit. 6. PAIN: I try to make the post-operative course tolerable, but you will-and should-have some discomfort for a few days (even with pain medication.)      a)  Walking is fine, but too much activity after surgery can aggravate pain. On the other hand, you don't need to be in bed all day either. You should ambulate every few hours while awake. Focus on basic activities like ambulating to bathroom, to meals, and very short trips outside (even to mailbox may be excessive) and go back to comfortable resting position with ice packs.   b)  Very important:  Use ice packs as often as possible (fifteen minutes on, fifteen minutes off and exchange as needed). c) If pain is not managed with the above measures, a narcotic will be provided for break-through pain which should be used very carefully. In some cases, narcotic medication may cause constipation - Colace, senna or Milk of Magnesia may be used as needed. You should not go more than 24 hours outside of your usual time of passing stool. 7. WOUND: If you notice any increased redness, swelling, pain or fever, please call the office. If this is noticed at a time after normal office hours, please call our answering service at [phone]. The  will then contact your surgeon or the Surgical Associate Ronnie Balbuena. 8. URINATION: If unable to void (unable to pass your water) for more than 10 hours after your surgery please return to our hospital emergency department. 9. APPOINTMENT: I would like to see you in the office in in 14-21 days. If this appointment has not been made for you prior to your departure from Outpatient Surgery, please call the office to schedule your appointment. 10. DISCOLORATION: Do not be alarmed by black or bluish discoloration of your anatomy. This may extent to the scrotum or labia. This will be due to blood under the skin that will absorb itself and resolve over several days to weeks with no ill-effects. If you have any questions or concerns, please do not hesitate to call or ask any other staff member who will be able to assist you.     Isidro Sabillon MD  [address] from your Nurse    The following personal items collected during your admission are returned to you:   Dental Appliance: Dental Appliances: None  Vision: Visual Aid: Glasses  Hearing Aid:    Jewelry:    Clothing:    Other Valuables:    Valuables sent to safe:      PATIENT INSTRUCTIONS:    After general anesthesia or intravenous sedation, for 24 hours or while taking prescription Narcotics:  · Limit your activities  · Do not drive and operate hazardous machinery  · Do not make important personal or business decisions  · Do  not drink alcoholic beverages  · If you have not urinated within 8 hours after discharge, please contact your surgeon on call. Report the following to your surgeon:  · Excessive pain, swelling, redness or odor of or around the surgical area  · Temperature over 100.5  · Nausea and vomiting lasting longer than 4 hours or if unable to take medications  · Any signs of decreased circulation or nerve impairment to extremity: change in color, persistent  numbness, tingling, coldness or increase pain  · Any questions    COUGH AND DEEP BREATHE    Breathing deep and coughing are very important exercises to do after surgery. Deep breathing and coughing open the little air tubes and air sacks in your lungs. You take deep breaths every day. You may not even notice - it is just something you do when you sigh or yawn. It is a natural exercise you do to keep these air passages open. After surgery, take deep breaths and cough, on purpose. Coughing and deep breathing help prevent bronchitis and pneumonia after surgery. If you had chest or belly surgery, use a pillow as a \"hug buddy\" and hold it tightly to your chest or belly when you cough. DIRECTIONS:  6. Take 10 to 15 slow deep breaths every hour while awake. 7. Breathe in deeply, and hold it for 2 seconds. 8. Exhale slowly through puckered lips, like blowing up a balloon. 9. After every 4th or 5th deep breath, hug your pillow to your chest or belly and give a hard, deep cough. Yes, it will probably hurt. But doing this exercise is very important part of healing after surgery. Take your pain medicine to help you do this exercise without too much pain. IF YOU HAVE BEEN DIAGNOSED WITH SLEEP APNEA, PLEASE USE YOUR SLEEP APNEA DEVICE OR CPAP MACHINE WHEN YOU INTEND TO NAP AFTER TAKING PAIN MEDICATION.     Ankle Pumps    Ankle pumps increase the circulation of oxygenated blood to your lower extremities and decrease your risk for circulation problems such as blood clots. They also stretch the muscles, tendons and ligaments in your foot and ankle, and prevent joint contracture in the ankle and foot, especially after surgeries on the legs. It is important to do ankle pump exercises regularly after surgery because immobility increases your risk for developing a blood clot. Your doctor may also have you take an Aspirin for the next few days as well. If your doctor did not ask you to take an Aspirin, consult with him before starting Aspirin therapy on your own. Slowly point your foot forward, feeling the muscles on the top of your lower leg stretch, and hold this position for 5 seconds. Next, pull your foot back toward you as far as possible, stretching the calf muscles, and hold that position for 5 seconds. Repeat with the other foot. Perform 10 repetitions every hour while awake for both ankles if possible (down and then up with the foot once is one repetition). You should feel gentle stretching of the muscles in your lower leg when doing this exercise. If you feel pain, or your range of motion is limited, don't  Push too hard. Only go the limit your joint and muscles will let you go. If you have increasing pain, progressively worsening leg warmth or swelling, STOP the exercise and call your doctor. Below is information about the medications your doctor is prescribing after your visit:    Other information in your discharge envelope:  []     PRESCRIPTIONS  []     PHYSICAL THERAPY PRESCRIPTION  []     APPOINTMENT CARDS  []     Regional Anesthesia Pamphlet for block or block with On-Q Catheter from Anesthesia Service  []     Medical device information sheets/pamphlets from their    []     School/work excuse note. []     /parent work excuse note.       These are general instructions for a healthy lifestyle:    *  Please give a list of your current medications to your Primary Care Provider. *  Please update this list whenever your medications are discontinued, doses are      changed, or new medications (including over-the-counter products) are added. *  Please carry medication information at all times in case of emergency situations. About Smoking  No smoking / No tobacco products / Avoid exposure to second hand smoke    Surgeon General's Warning:  Quitting smoking now greatly reduces serious risk to your health. Obesity, smoking, and sedentary lifestyle greatly increases your risk for illness and disease. A healthy diet, regular physical exercise & weight monitoring are important for maintaining a healthy lifestyle. Congestive Heart Failure  You may be retaining fluid if you have a history of heart failure or if you experience any of the following symptoms:  Weight gain of 3 pounds or more overnight or 5 pounds in a week, increased swelling in our hands or feet or shortness of breath while lying flat in bed. Please call your doctor as soon as you notice any of these symptoms; do not wait until your next office visit. Recognize signs and symptoms of STROKE:  F - face looks uneven  A - arms unable to move or move even  S - speech slurred or non-existent  T - time-call 911 as soon as signs and symptoms begin-DO NOT go         Back to bed or wait to see if you get better-TIME IS BRAIN. Warning signs of HEART ATTACK  Call 911 if you have these symptoms    · Chest discomfort. Most heart attacks involve discomfort in the center of the chest that lasts more than a few minutes, or that goes away and comes back. It can feel like uncomfortable pressure, squeezing, fullness, or pain. · Discomfort in other areas of the upper body. Symptoms can include pain or discomfort in one or both        Arms, the back, neck, jaw, or stomach.   ·  Shortness of breath with or without chest discomfort. · Other signs may include breaking out in a cold sweat, nausea, or lightheadedness    Don't wait more than five minutes to call 911 - MINUTES MATTER! Fast action can save your life. Calling 911 is almost always the fastest way to get lifesaving treatment. Emergency Medical Services staff can begin treatment when they arrive - up to an hour sooner than if someone gets to the hospital by car. YULIYA RINALDI MEDICATION AND SIDE EFFECT GUIDE    The [employer] MEDICATION AND SIDE EFFECT GUIDE was provided to the PATIENT AND CARE PROVIDER.   Information provided includes instruction about drug purpose and common side effects for the following medications:    · Percocet

## 2019-03-07 NOTE — ANESTHESIA PREPROCEDURE EVALUATION
Anesthetic History   No history of anesthetic complications            Review of Systems / Medical History  Patient summary reviewed, nursing notes reviewed and pertinent labs reviewed    Pulmonary  Within defined limits                 Neuro/Psych   Within defined limits           Cardiovascular    Hypertension: well controlled        Dysrhythmias : atrial fibrillation  CAD      Comments: S/P cardioversion  S/P MVR 2012   GI/Hepatic/Renal     GERD: well controlled          Comments: IBS Endo/Other        Arthritis     Other Findings              Physical Exam    Airway  Mallampati: I    Neck ROM: normal range of motion   Mouth opening: Normal     Cardiovascular    Rhythm: regular  Rate: normal         Dental    Dentition: Caps/crowns     Pulmonary  Breath sounds clear to auscultation               Abdominal         Other Findings            Anesthetic Plan    ASA: 2  Anesthesia type: general          Induction: Intravenous  Anesthetic plan and risks discussed with: Patient      Informed consent obtained.

## 2019-03-07 NOTE — BRIEF OP NOTE
BRIEF OPERATIVE NOTE    Date of Procedure: 3/7/2019   Preoperative Diagnosis: BILATERAL INGUINAL HERNIAS  Postoperative Diagnosis: RIGHT DIRECT/INDIRECT INGUINAL HERNIA  LEFT INDIRECT INGUINAL HERNIA  Procedure(s):  LAPAROSCOPIC BILATERAL INGUINAL HERNIA REPAIR WITH MESH  Surgeon(s) and Role:     Jose Tierney MD - Primary    Surgical Staff:  Circ-1: Bianca Posada RN  Scrub Tech-1: Elmer Garcia  Surg Asst-1: Ineliane Garcia  Surg Asst-2: May, Garima  Event Time In Time Out   Incision Start 1037    Incision Close       Anesthesia: General   Estimated Blood Loss: Min  Specimens: * No specimens in log *   Findings: 2 cm direct, 1 cm indirect right side defect, hernia sac in inguinal canal  1 cm indirect defect, spermatic cord lipoma  Complications: none  Implants:   Implant Name Type Inv.  Item Serial No.  Lot No. LRB No. Used Action   MESH BETTE LG 4X6IN R LF -- 3DMAX - SNA Mesh MESH BETTE LG 4X6IN R LF -- 3DMAX NA BARD [employer] [id number] Right 1 Implanted   MESH BETTE LG LT 4X6IN -- 3DMAX - SNA Mesh MESH BETTE LG LT 4X6IN -- 3DMAX NA BARD DAVOL [id number] Left 1 Implanted

## 2019-03-07 NOTE — ANESTHESIA POSTPROCEDURE EVALUATION
Procedure(s):  LAPAROSCOPIC BILATERAL INGUINAL HERNIA REPAIR WITH MESH. Anesthesia Post Evaluation      Multimodal analgesia: multimodal analgesia not used between 6 hours prior to anesthesia start to PACU discharge  Patient location during evaluation: PACU  Patient participation: complete - patient participated  Level of consciousness: awake and alert  Pain score: 0  Pain management: adequate  Airway patency: patent  Anesthetic complications: no  Cardiovascular status: hemodynamically stable and acceptable  Respiratory status: acceptable  Hydration status: acceptable  Comments: Patient seen and evaluated; no concerns.   Post anesthesia nausea and vomiting:  none      Visit Vitals  /82   Pulse 65   Temp 36.4 °C (97.5 °F)   Resp 13   Ht 6' (1.829 m)   Wt 82.2 kg (181 lb 3.5 oz)   SpO2 95%   BMI 24.58 kg/m²

## 2019-03-07 NOTE — OP NOTES
OPERATIVE NOTE    Date of Procedure: 3/7/2019   Preoperative Diagnosis: BILATERAL INGUINAL HERNIAS  Postoperative Diagnosis: RIGHT DIRECT/INDIRECT INGUINAL HERNIA  LEFT INDIRECT INGUINAL HERNIA  Procedure(s):  LAPAROSCOPIC BILATERAL INGUINAL HERNIA REPAIR WITH MESH  Surgeon(s) and Role:     Katarzyna Barrow MD - Primary    Surgical Staff:  Circ-1: Narcisa Posada RN  Scrub Tech-1: Lakisha Pulse  Surg Asst-1: Pelon Congo  Surg Asst-2: May, Garima  Event Time In Time Out   Incision Start 1037    Incision Close       Anesthesia: General   Estimated Blood Loss: Min  Specimens: * No specimens in log *   Findings: 2 cm direct, 1 cm indirect right side defect, hernia sac in inguinal canal  1 cm indirect defect, spermatic cord lipoma  Complications: none  Implants:   Implant Name Type Inv. Item Serial No.  Lot No. LRB No. Used Action   MESH BETTE LG 4X6IN R LF -- 3DMAX - SNA Mesh MESH BETTE LG 4X6IN R LF -- 3DMAX NA BARD DAVOL [id number] Right 1 Implanted   MESH BETTE LG LT 4X6IN -- 3DMAX - SNA Mesh MESH BETTE LG LT 4X6IN -- 3DMAX NA BARD DAVOL [id number] Left 1 Implanted       Indication:  See H/P. Procedure:  Site of surgery and procedure was verified and marked in pre-operative holding and again in the operating room. Preoperative antibiotics were given 30 minutes prior to skin incision. Sequential compression devices were placed and turned on. Patient placed supine and general anesthesia was instituted sucessfully. Both arms were tucked and the abdomen was prepped and draped in usual fashion. Santiago catheter was placed. Exparel expanded with 0.5% plain marcaine was injected subcutaneously along the projected port sites. Right horizontal paraumbilical skin incision was made one minute later and carried down with blunt dissection and electrocautery to expose the right anterior rectus sheath. The anterior sheath was opened transversely with a #11 blade, exposing right rectus muscle fiber.   Using a finger and blunt dissection, a tunnel was created. The 10 mm structural balloon was inserted in the same position. Using the hand pump the structural balloon was inflated and seated. Carbon dioxide insufflation then created a satisfactory pneumopro-peritoneum. Two 5mm ports were then inserted ventrally and mid-line under direct vision. The right side was approached. Juice's ligament, the inferior epigastric vessels, the spermatic cord, the position of the iliac vessels and the iliopubic tract were clearly identified. The lateral dissection was performed satisfactorily. An indirect hernia with sac extending into the scrotum was dissected carefully from the spermatic cord and inguinal defect with gentle traction into the dorsal abdomen. A 1 cm direct defect was noted medial to epigastric vessels. His right sided indirect defect was approximately 2 cm. No femoral or obturator defect was identified. No dissection was performed posterior to the iliopubic tract. On the patient's left side, Juice's ligament, the inferior epigastric vessels, the spermatic cord, the position of the iliac vessels and the iliopubic tract were clearly identified  The lateral dissection was performed satisfactorily. A spermatic cord lipoma was dissected carefully from the inguinal defect with gentle traction into the dorsal abdomen. His left sided indirect defect was approximately 1 cm. No femoral, direct or obturator defect was identified. No dissection was performed posterior to the iliopubic tract. At this juncture, a large right anatomic synthetic mesh was prepared and introduced into the operative field. It was deployed over the myopectineal orifice to cover the direct, indirect and obturator spaces. The mesh was oriented appropriately to the iliac vessels and appropriately covered the defect. Large left anatomic synthetic mesh was then prepared and introduced into the operative field.   It was deployed over the myopectineal orifice to cover the defect. The mesh was oriented appropriately to the iliac vessels and covered the direct, indirect and obturator spaces. I visualized both hernia sacs on releasing pneumopro-peritoneum. Both hernia sacs were clearly posterior to the mesh and had no way to come lateral or medial around mesh placement. Hemostasis was satisfactory. Exparel expanded with 0.5% marcaine was injected directly into the posterior rectus sheath and incision sites. Peritoneum showed no defects. The 5mm trocars and 10 mm balloon were removed under direct vision and the properitoneal space collapsed slowly. The transverse anterior sheath defect was closed with 0 vicryl suture. Skin was then closed with 4-0 Monocryl, steristrips and tegaderm. Santiago was discontinued, and the patient was extubated. Two testicles were palpated in the scrotum. I went to discuss the findings of the case with his wife in the waiting area.     Sandra Gallagher MD

## 2019-03-07 NOTE — H&P
Patient interviewed and examined again. No change to paper H/P. Tess Farrell MD       Assessment:     Symptomatic right inguinal hernia, asymptomatic left inguinal hernia    Plan:     Laparoscopic bilateral inguinal hernia repair with mesh    Signed By: Tess Farrell MD  [facility]  Office:  [phone]    March 7, 2019          General Surgery History    Subjective:      Mora Bravo is a 68 y.o.  male who presents with symptomatic right inguinal hernia. See paper h/p for full details. Past Medical History:   Diagnosis Date    A-fib [facility])     cardioversion 12/2018    CAD (coronary artery disease)     Chronic systolic heart failure (HCC)     per Dr. Vera Ness note    GERD (gastroesophageal reflux disease)     Hypertension     IBS (irritable bowel syndrome)     Ill-defined condition     \"moderately dilated ascending aorta\" per Dr. Paul Mendez note    S/P mitral valve repair     ECHO 3/6/2017: EF 60%, mild HK basal-mid inferolateral wall. Mod LAE, mild MAC, prioe MVR,  mild MR, MI, TR     Sciatica 2009     Past Surgical History:   Procedure Laterality Date    HX COLONOSCOPY  2018    HX ENDOSCOPY      MITRALPLASTY W PROSTHETIC RING  2012    repair of mitral valve      Family History   Problem Relation Age of Onset    Stroke Mother     Hypertension Father      Social History     Socioeconomic History    Marital status:      Spouse name: Not on file    Number of children: Not on file    Years of education: Not on file    Highest education level: Not on file   Tobacco Use    Smoking status: Never Smoker    Smokeless tobacco: Never Used   Substance and Sexual Activity    Alcohol use:  Yes     Alcohol/week: 6.0 oz     Types: 8 Cans of beer, 1 Glasses of wine, 1 Shots of liquor per week     Comment: every other night    Drug use: No      Current Facility-Administered Medications   Medication Dose Route Frequency    lidocaine (PF) (XYLOCAINE) 10 mg/mL (1 %) injection 0.1 mL  0.1 mL SubCUTAneous PRN    lactated Ringers infusion  125 mL/hr IntraVENous CONTINUOUS    lactated ringers bolus infusion 1,000 mL  1,000 mL IntraVENous ONCE    naloxone (NARCAN) injection 0.2 mg  0.2 mg IntraVENous EVERY 2 MINUTES AS NEEDED    flumazenil (ROMAZICON) 0.1 mg/mL injection 0.2 mg  0.2 mg IntraVENous Multiple    lactated Ringers infusion  125 mL/hr IntraVENous CONTINUOUS    ceFAZolin (ANCEF) 2 g/20 mL in sterile water IV syringe  2 g IntraVENous ONCE      No Known Allergies    Review of Systems:     []     Unable to obtain  ROS due to  []    mental status change  []    sedated   []    intubated   [x]    Total of 12 system negative, unless specified below or in HPI:  Constitutional: negative fever, negative chills, negative weight loss  Eyes:   negative visual changes  ENT:   negative sore throat, tongue or lip swelling  Respiratory:  negative cough, negative dyspnea  Cards:  negative for chest pain, palpitations, lower extremity edema  GI:   negative for nausea, vomiting, diarrhea, and abdominal pain  :  negative for frequency, dysuria  Integument:  negative for rash and pruritus  Heme:  negative for easy bruising and gum/nose bleeding  Musculoskel: negative for myalgias,  back pain and muscle weakness  Neuro:  negative for headaches, dizziness, vertigo  Psych:  negative for feelings of anxiety, depression     Objective:        Patient Vitals for the past 8 hrs:   BP Temp Pulse Resp SpO2 Height Weight   19 0924      6' (1.829 m) 82.2 kg (181 lb 3.5 oz)   19 0833 125/83 98.1 °F (36.7 °C) (!) 57 16 96 %     19 0830       82.3 kg (181 lb 6 oz)       Temp (24hrs), Av.1 °F (36.7 °C), Min:98.1 °F (36.7 °C), Max:98.1 °F (36.7 °C)      Physical Exam:  General:  Alert, cooperative, no distress, appears stated age. Eyes:  Conjunctivae/corneas clear. PERRL, EOMs intact. Nose: Nares normal. Septum midline.  Mucosa normal. No drainage or sinus tenderness. Mouth/Throat: Lips, mucosa, and tongue normal. Teeth and gums normal.   Neck: Supple, symmetrical, trachea midline, no adenopathy, thyroid: no enlargment/tenderness/nodules, no carotid bruit and no JVD. Back:   Symmetric, no curvature. ROM normal. No CVA tenderness. Lungs:   Clear to auscultation bilaterally. Heart:  Regular rate and rhythm, S1, S2 normal, no murmur, click, rub or gallop. Abdomen:   Soft, non-tender. Bilateral reducible groin bulge. Bowel sounds normal. No masses,  No organomegaly. Extremities: Extremities normal, atraumatic, no cyanosis or edema. Pulses: 2+ and symmetric all extremities. Skin: Skin color, texture, turgor normal. No rashes or lesions   Lymph nodes: Cervical, supraclavicular, and axillary nodes normal.     Labs: No results for input(s): WBC, HGB, HCT, PLT, HGBEXT, HCTEXT, PLTEXT in the last 72 hours. No results for input(s): NA, K, CL, CO2, GLU, BUN, CREA, CA, MG, PHOS, ALB, TBIL, TBILI, SGOT, ALT in the last 72 hours. No results for input(s): INR in the last 72 hours.     No lab exists for component: INREXT

## 2019-03-08 LAB
ATRIAL RATE: 57 BPM
CALCULATED P AXIS, ECG09: 81 DEGREES
CALCULATED R AXIS, ECG10: -57 DEGREES
CALCULATED T AXIS, ECG11: -71 DEGREES
DIAGNOSIS, 93000: NORMAL
P-R INTERVAL, ECG05: 238 MS
Q-T INTERVAL, ECG07: 472 MS
QRS DURATION, ECG06: 132 MS
QTC CALCULATION (BEZET), ECG08: 459 MS
VENTRICULAR RATE, ECG03: 57 BPM

## 2019-03-09 ENCOUNTER — HOSPITAL ENCOUNTER (INPATIENT)
Age: 74
LOS: 3 days | Discharge: ANOTHER HOSPITAL | DRG: 287 | End: 2019-03-12
Attending: EMERGENCY MEDICINE | Admitting: INTERNAL MEDICINE
Payer: MEDICARE

## 2019-03-09 DIAGNOSIS — R07.9 CHEST PAIN: ICD-10-CM

## 2019-03-09 DIAGNOSIS — I47.20 VENTRICULAR TACHYCARDIA: Primary | ICD-10-CM

## 2019-03-09 LAB
ALBUMIN SERPL-MCNC: 3.7 G/DL (ref 3.5–5)
ALBUMIN/GLOB SERPL: 1 {RATIO} (ref 1.1–2.2)
ALP SERPL-CCNC: 75 U/L (ref 45–117)
ALT SERPL-CCNC: 32 U/L (ref 12–78)
ANION GAP SERPL CALC-SCNC: 8 MMOL/L (ref 5–15)
AST SERPL-CCNC: 45 U/L (ref 15–37)
BASOPHILS # BLD: 0 K/UL (ref 0–0.1)
BASOPHILS NFR BLD: 0 % (ref 0–1)
BILIRUB SERPL-MCNC: 0.3 MG/DL (ref 0.2–1)
BNP SERPL-MCNC: 2857 PG/ML
BUN SERPL-MCNC: 21 MG/DL (ref 6–20)
BUN/CREAT SERPL: 15 (ref 12–20)
CALCIUM SERPL-MCNC: 8.7 MG/DL (ref 8.5–10.1)
CHLORIDE SERPL-SCNC: 106 MMOL/L (ref 97–108)
CO2 SERPL-SCNC: 24 MMOL/L (ref 21–32)
COMMENT, HOLDF: NORMAL
CREAT SERPL-MCNC: 1.39 MG/DL (ref 0.7–1.3)
D DIMER PPP FEU-MCNC: 1.34 MG/L FEU (ref 0–0.65)
DIFFERENTIAL METHOD BLD: ABNORMAL
EOSINOPHIL # BLD: 0 K/UL (ref 0–0.4)
EOSINOPHIL NFR BLD: 0 % (ref 0–7)
ERYTHROCYTE [DISTWIDTH] IN BLOOD BY AUTOMATED COUNT: 13 % (ref 11.5–14.5)
GLOBULIN SER CALC-MCNC: 3.6 G/DL (ref 2–4)
GLUCOSE SERPL-MCNC: 161 MG/DL (ref 65–100)
HCT VFR BLD AUTO: 40.4 % (ref 36.6–50.3)
HGB BLD-MCNC: 13.3 G/DL (ref 12.1–17)
IMM GRANULOCYTES # BLD AUTO: 0.1 K/UL (ref 0–0.04)
IMM GRANULOCYTES NFR BLD AUTO: 1 % (ref 0–0.5)
LYMPHOCYTES # BLD: 4.8 K/UL (ref 0.8–3.5)
LYMPHOCYTES NFR BLD: 44 % (ref 12–49)
MAGNESIUM SERPL-MCNC: 2.2 MG/DL (ref 1.6–2.4)
MCH RBC QN AUTO: 32.2 PG (ref 26–34)
MCHC RBC AUTO-ENTMCNC: 32.9 G/DL (ref 30–36.5)
MCV RBC AUTO: 97.8 FL (ref 80–99)
MONOCYTES # BLD: 1 K/UL (ref 0–1)
MONOCYTES NFR BLD: 9 % (ref 5–13)
NEUTS SEG # BLD: 5.1 K/UL (ref 1.8–8)
NEUTS SEG NFR BLD: 46 % (ref 32–75)
NRBC # BLD: 0 K/UL (ref 0–0.01)
NRBC BLD-RTO: 0 PER 100 WBC
PLATELET # BLD AUTO: 215 K/UL (ref 150–400)
PMV BLD AUTO: 10.1 FL (ref 8.9–12.9)
POTASSIUM SERPL-SCNC: 4.3 MMOL/L (ref 3.5–5.1)
PROT SERPL-MCNC: 7.3 G/DL (ref 6.4–8.2)
RBC # BLD AUTO: 4.13 M/UL (ref 4.1–5.7)
SAMPLES BEING HELD,HOLD: NORMAL
SODIUM SERPL-SCNC: 138 MMOL/L (ref 136–145)
TROPONIN I BLD-MCNC: <0.04 NG/ML (ref 0–0.08)
TROPONIN I SERPL-MCNC: <0.05 NG/ML
WBC # BLD AUTO: 11 K/UL (ref 4.1–11.1)

## 2019-03-09 PROCEDURE — 84484 ASSAY OF TROPONIN QUANT: CPT

## 2019-03-09 PROCEDURE — 80053 COMPREHEN METABOLIC PANEL: CPT

## 2019-03-09 PROCEDURE — 83880 ASSAY OF NATRIURETIC PEPTIDE: CPT

## 2019-03-09 PROCEDURE — 5A2204Z RESTORATION OF CARDIAC RHYTHM, SINGLE: ICD-10-PCS | Performed by: EMERGENCY MEDICINE

## 2019-03-09 PROCEDURE — 92960 CARDIOVERSION ELECTRIC EXT: CPT

## 2019-03-09 PROCEDURE — 85379 FIBRIN DEGRADATION QUANT: CPT

## 2019-03-09 PROCEDURE — 96365 THER/PROPH/DIAG IV INF INIT: CPT

## 2019-03-09 PROCEDURE — 74011250637 HC RX REV CODE- 250/637: Performed by: INTERNAL MEDICINE

## 2019-03-09 PROCEDURE — 74011250636 HC RX REV CODE- 250/636: Performed by: EMERGENCY MEDICINE

## 2019-03-09 PROCEDURE — 77030022643 HC PAD DEFIB AD HRTSTRT PHL -B

## 2019-03-09 PROCEDURE — 65610000006 HC RM INTENSIVE CARE

## 2019-03-09 PROCEDURE — 99285 EMERGENCY DEPT VISIT HI MDM: CPT

## 2019-03-09 PROCEDURE — 83735 ASSAY OF MAGNESIUM: CPT

## 2019-03-09 PROCEDURE — 93005 ELECTROCARDIOGRAM TRACING: CPT

## 2019-03-09 PROCEDURE — 85025 COMPLETE CBC W/AUTO DIFF WBC: CPT

## 2019-03-09 PROCEDURE — 36415 COLL VENOUS BLD VENIPUNCTURE: CPT

## 2019-03-09 RX ORDER — DICYCLOMINE HYDROCHLORIDE 20 MG/1
20 TABLET ORAL
Status: DISCONTINUED | OUTPATIENT
Start: 2019-03-10 | End: 2019-03-12 | Stop reason: HOSPADM

## 2019-03-09 RX ORDER — MAGNESIUM SULFATE HEPTAHYDRATE 40 MG/ML
INJECTION, SOLUTION INTRAVENOUS
Status: DISPENSED
Start: 2019-03-09 | End: 2019-03-10

## 2019-03-09 RX ORDER — ETOMIDATE 2 MG/ML
INJECTION INTRAVENOUS
Status: DISPENSED
Start: 2019-03-09 | End: 2019-03-10

## 2019-03-09 RX ORDER — POLYETHYLENE GLYCOL 3350 17 G/17G
17 POWDER, FOR SOLUTION ORAL DAILY
COMMUNITY
End: 2022-04-21

## 2019-03-09 RX ORDER — LISINOPRIL 5 MG/1
10 TABLET ORAL
Status: DISCONTINUED | OUTPATIENT
Start: 2019-03-09 | End: 2019-03-11

## 2019-03-09 RX ORDER — PANTOPRAZOLE SODIUM 40 MG/1
40 TABLET, DELAYED RELEASE ORAL
Status: DISCONTINUED | OUTPATIENT
Start: 2019-03-10 | End: 2019-03-12 | Stop reason: HOSPADM

## 2019-03-09 RX ORDER — ALPRAZOLAM 0.5 MG/1
0.5 TABLET ORAL
Status: DISCONTINUED | OUTPATIENT
Start: 2019-03-09 | End: 2019-03-12 | Stop reason: HOSPADM

## 2019-03-09 RX ORDER — MAGNESIUM SULFATE HEPTAHYDRATE 40 MG/ML
INJECTION, SOLUTION INTRAVENOUS
Status: COMPLETED | OUTPATIENT
Start: 2019-03-09 | End: 2019-03-09

## 2019-03-09 RX ORDER — METOPROLOL TARTRATE 50 MG/1
50 TABLET ORAL 2 TIMES DAILY
COMMUNITY
End: 2019-03-12

## 2019-03-09 RX ORDER — PANTOPRAZOLE SODIUM 40 MG/1
40 TABLET, DELAYED RELEASE ORAL
COMMUNITY

## 2019-03-09 RX ORDER — SODIUM CHLORIDE 0.9 % (FLUSH) 0.9 %
5-40 SYRINGE (ML) INJECTION AS NEEDED
Status: DISCONTINUED | OUTPATIENT
Start: 2019-03-09 | End: 2019-03-12 | Stop reason: HOSPADM

## 2019-03-09 RX ORDER — METOPROLOL TARTRATE 50 MG/1
50 TABLET ORAL 2 TIMES DAILY
Status: DISCONTINUED | OUTPATIENT
Start: 2019-03-09 | End: 2019-03-10

## 2019-03-09 RX ORDER — SODIUM CHLORIDE 0.9 % (FLUSH) 0.9 %
5-40 SYRINGE (ML) INJECTION EVERY 8 HOURS
Status: DISCONTINUED | OUTPATIENT
Start: 2019-03-09 | End: 2019-03-12 | Stop reason: HOSPADM

## 2019-03-09 RX ORDER — DOCUSATE SODIUM 100 MG/1
100 CAPSULE, LIQUID FILLED ORAL 2 TIMES DAILY
COMMUNITY
End: 2022-04-21

## 2019-03-09 RX ORDER — DILTIAZEM HYDROCHLORIDE 240 MG/1
240 CAPSULE, COATED, EXTENDED RELEASE ORAL DAILY
Status: DISCONTINUED | OUTPATIENT
Start: 2019-03-10 | End: 2019-03-10

## 2019-03-09 RX ADMIN — METOPROLOL TARTRATE 50 MG: 50 TABLET ORAL at 21:46

## 2019-03-09 RX ADMIN — LISINOPRIL 10 MG: 5 TABLET ORAL at 21:45

## 2019-03-09 RX ADMIN — ALPRAZOLAM 0.5 MG: 0.5 TABLET ORAL at 21:53

## 2019-03-09 RX ADMIN — MAGNESIUM SULFATE HEPTAHYDRATE 2 G: 40 INJECTION, SOLUTION INTRAVENOUS at 14:45

## 2019-03-09 RX ADMIN — Medication 10 ML: at 23:00

## 2019-03-09 RX ADMIN — RIVAROXABAN 20 MG: 20 TABLET, FILM COATED ORAL at 21:46

## 2019-03-09 NOTE — PROGRESS NOTES
[facility]: MED RECONCILIATION    Comments/Recommendations:    Patient appears to be familiar with current medications and was able to list them off for the most part.  Patient recently had a double hernia repair at [facility] on 3/7/19 and was discharged.  Rivaroxaban was held for ~3 days prior to surgery per patient. He took his first dose since surgery last night.  Patient was prescribed Percocet following surgery along with a bowel regimen. Patient/wife report that he has not had a BM in ~3 days. Medications added:     · Miralax daily - this was added to his pain meds following double hernia surgery on 3/7  · Docusate 100 mg PO BID - this was added to his pain meds following double hernia surgery on 3/7    Medications removed:    · none    Medications adjusted:    · Pantoprazole 40 mg PO daily PRN (instead of scheduled)    Information obtained from: patient, wife/daughter, Rx query     Significant PMH/Disease States:   Past Medical History:   Diagnosis Date    A-fib [facility])     cardioversion 12/2018    CAD (coronary artery disease)     Chronic systolic heart failure ([facility] [address]     per Dr. Carmen Blanco note    GERD (gastroesophageal reflux disease)     Hypertension     IBS (irritable bowel syndrome)     Ill-defined condition     \"moderately dilated ascending aorta\" per Dr. Edgar Bah note    S/P mitral valve repair     ECHO 3/6/2017: EF 60%, mild HK basal-mid inferolateral wall. Mod LAE, mild MAC, prioe MVR,  mild MR, MI, TR     Sciatica 2009     Chief Complaint for this Admission:   Chief Complaint   Patient presents with    Irregular Heart Beat     Allergies: Patient has no known allergies. Prior to Admission Medications:     Medication Documentation Review Audit       Reviewed by Wendi Diaz (Pharmacist) on 03/09/19 at 1550      Medication Sig Documenting Provider Last Dose Status Taking? ALPRAZolam (XANAX) 1 mg tablet Take 0.5 mg by mouth nightly as needed for Anxiety.  Provider, Historical Active Yes   dicyclomine (BENTYL) 20 mg tablet Take 20 mg by mouth two (2) times daily as needed. Provider, Historical  Active Yes   dilTIAZem CD (CARDIZEM CD) 240 mg ER capsule Take 240 mg by mouth daily. Pauline Engle MD 3/9/2019 am Active Yes   docusate sodium (COLACE) 100 mg capsule Take 100 mg by mouth two (2) times a day. Provider, Historical 3/9/2019 am Active Yes   lisinopril (PRINIVIL, ZESTRIL) 10 mg tablet Take 10 mg by mouth nightly. Provider, Historical 3/9/2019 am Active Yes   metoprolol tartrate (LOPRESSOR) 50 mg tablet Take 50 mg by mouth two (2) times a day. Provider, Historical 3/9/2019 am Active Yes   metoprolol tartrate (LOPRESSOR) tablet 50 mg  Leonardo Cartagena MD  Active    oxyCODONE-acetaminophen (PERCOCET) 5-325 mg per tablet Take 1 Tab by mouth every four (4) hours as needed for Pain for up to 7 days. Max Daily Amount: 6 Tabs. Anabell Crenshaw MD 3/9/2019 am Active Yes   pantoprazole (PROTONIX) 40 mg tablet Take 40 mg by mouth daily as needed. Provider, Historical  Active Yes   pantoprazole (PROTONIX) tablet 40 mg  Leonardo Cartagena MD  Active    polyethylene glycol (MIRALAX) 17 gram packet Take 17 g by mouth daily. Provider, Historical 3/9/2019 am Active Yes   rivaroxaban (XARELTO) 20 mg tab tablet Take 20 mg by mouth nightly. Provider, Historical 3/8/2019 pm Active Yes                  Thank you for the consult,  [facility]

## 2019-03-09 NOTE — Clinical Note
TRANSFER - OUT REPORT:  
 
Verbal report given to: Familia Chun. Report consisted of patient's Situation, Background, Assessment and  
Recommendations(SBAR). Opportunity for questions and clarification was provided. Patient transported with a Registered Nurse, [address] [place] [address] / Patient Care Tech and Monitor. Patient transported to: ICU 11.

## 2019-03-09 NOTE — Clinical Note
TRANSFER - IN REPORT:  
 
Verbal report received from: domo. Report consisted of patient's Situation, Background, Assessment and  
Recommendations(SBAR). Opportunity for questions and clarification was provided. Assessment completed upon patient's arrival to unit and care assumed. Patient transported with a Registered Nurse, [address] [place] [address] / Patient Care Tech and Monitor.

## 2019-03-09 NOTE — ED PROVIDER NOTES
68 y.o. male with past medical history significant for HTN, CAD, a-fib, sciatica, GERD, IBS, chronic systolic heart failure, moderately dilated ascending aorta, and surgical history s/p mitral valve repair who presents from home via EMS with chief complaint of chest pain. Pt reports that he had a hernia repair surgery on Thursday (3 days ago) and reports sudden onset of chest pain and SOB. Pt describes the chest pain as \"achy\" with radiation into his left arm. Pt rates his pain as a \"4\"/10. Per EMS, pt was found with a heart rate of 230 and BP of 110/70. Pt was started on a 150 amio drip PTA. Pt reports taking 324 mg aspirin PTA. Pt states he takes Xarelto. In addition pt adds that he has been constipated since before his hernia repair. Pt denies any allergies to medicines. There are no other acute medical concerns at this time. Social hx: Never smoker. PCP: Lynne Peterson MD    Note written by rob Stark, as dictated by Philip Elizalde MD 2:33 PM        The history is provided by the patient and the EMS personnel. No  was used. Past Medical History:   Diagnosis Date    A-fib [facility])     cardioversion 12/2018    CAD (coronary artery disease)     Chronic systolic heart failure (HCC)     per Dr. Melissa Blevins note    GERD (gastroesophageal reflux disease)     Hypertension     IBS (irritable bowel syndrome)     Ill-defined condition     \"moderately dilated ascending aorta\" per Dr. Sandra Payne note    S/P mitral valve repair     ECHO 3/6/2017: EF 60%, mild HK basal-mid inferolateral wall.  Mod LAE, mild MAC, prioe MVR,  mild MR, MI, TR     Sciatica 2009       Past Surgical History:   Procedure Laterality Date    HX COLONOSCOPY  2018    HX ENDOSCOPY      MITRALPLASTY W PROSTHETIC RING  2012    repair of mitral valve         Family History:   Problem Relation Age of Onset   [facility] Stroke Mother     Hypertension Father        Social History     Socioeconomic History    Marital status:      Spouse name: Not on file    Number of children: Not on file    Years of education: Not on file    Highest education level: Not on file   Social Needs    Financial resource strain: Not on file    Food insecurity - worry: Not on file    Food insecurity - inability: Not on file    Transportation needs - medical: Not on file   [employer] needs - non-medical: Not on file   Occupational History    Not on file   Tobacco Use    Smoking status: Never Smoker    Smokeless tobacco: Never Used   Substance and Sexual Activity    Alcohol use: Yes     Alcohol/week: 6.0 oz     Types: 8 Cans of beer, 1 Glasses of wine, 1 Shots of liquor per week     Comment: every other night    Drug use: No    Sexual activity: Not on file   Other Topics Concern    Not on file   Social History Narrative    Not on file         ALLERGIES: Patient has no known allergies. Review of Systems   Constitutional: Negative for activity change, chills and fever. HENT: Negative for nosebleeds, sore throat, trouble swallowing and voice change. Eyes: Negative for visual disturbance. Respiratory: Positive for shortness of breath. Cardiovascular: Positive for chest pain and palpitations (HR of 230). Gastrointestinal: Positive for constipation (no BM since before the hernia repair. ). Negative for abdominal pain, diarrhea and nausea. Genitourinary: Negative for difficulty urinating, dysuria, hematuria and urgency. Musculoskeletal: Negative for back pain, neck pain and neck stiffness. Skin: Negative for color change. Allergic/Immunologic: Negative for immunocompromised state. Neurological: Negative for dizziness, seizures, syncope, weakness, light-headedness, numbness and headaches. Psychiatric/Behavioral: Negative for behavioral problems, confusion, hallucinations, self-injury and suicidal ideas.        Vitals:    03/09/19 1532 03/09/19 1535 03/09/19 1544 03/09/19 1545   BP: 109/73 103/77 103/77 106/72   Pulse: (!) 59 (!) 54 (!) 54 (!) 59   Resp: 12 14 15 15   Temp:       SpO2: 98% 98% 98% 98%            Physical Exam   Constitutional: He is oriented to person, place, and time. He appears well-developed and well-nourished. No distress. HENT:   Head: Normocephalic and atraumatic. Eyes: Pupils are equal, round, and reactive to light. Neck: Normal range of motion. Neck supple. Cardiovascular: Regular rhythm and normal heart sounds. Tachycardia present. Exam reveals no gallop and no friction rub. No murmur heard. HR: 230. Pulmonary/Chest: Effort normal and breath sounds normal. No respiratory distress. He has no wheezes. Abdominal: Soft. Bowel sounds are normal. He exhibits no distension. There is no tenderness. There is no rebound and no guarding. Musculoskeletal: Normal range of motion. Neurological: He is alert and oriented to person, place, and time. Skin: Skin is warm. No rash noted. He is not diaphoretic. Psychiatric: He has a normal mood and affect. His behavior is normal. Judgment and thought content normal.   Nursing note and vitals reviewed. Note written by rob Wilkinson, as dictated by Joce Spencer MD 2:33 PM      MDM  Number of Diagnoses or Management Options  Critical Care  Total time providing critical care: 30-74 minutes (35 minutes. )      This is a 36-year-old male with past medical history, review of systems, physical exam as above, presenting EMS for complaints of chest pain, and arrhythmia. Per patient, he has approximately 3 days out from ventral hernia repair, began to experience chest pain this afternoon approximately 30 minutes prior to EMS arrival, EMS noted the patient to be in ventricular tachycardia with heart rate in the 220s. Upon arrival, patient remains tachycardic, mentating, with adequate blood pressure, complaining of mild chest pain.  He has received amiodarone in route, plan to administer magnesium, consult cardiology, consider cardioversion, obtain CMP, CBC, coags, cardiac enzymes. We will reevaluate, however the patient is likely to require admission for further care and evaluation. Procedural Sedation  Date/Time: 3/9/2019 2:51 PM  Performed by: Joce Spencer MD  Authorized by: Joce Spencer MD     Consent:     Consent obtained:  Written and verbal    Consent given by:  Patient    Risks discussed:   Allergic reaction, dysrhythmia, inadequate sedation, nausea, vomiting, respiratory compromise necessitating ventilatory assistance and intubation, prolonged sedation necessitating reversal and prolonged hypoxia resulting in organ damage  Indications:     Procedure performed:  Cardioversion    Procedure necessitating sedation performed by:  Physician performing sedation    Intended level of sedation:  Moderate (conscious sedation)  Pre-sedation assessment:     NPO status caution: urgency dictates proceeding with non-ideal NPO status      Neck mobility: normal      Mouth opening:  3 or more finger widths    Mallampati score:  III - soft palate, base of uvula visible    Pre-sedation assessments completed and reviewed: airway patency, cardiovascular function, hydration status, mental status, nausea/vomiting, pain level, respiratory function and temperature      History of difficult intubation: no      Pre-sedation assessment completed:  3/9/2019 3:01 PM  Immediate pre-procedure details:     Reviewed: vital signs and relevant labs/tests      Verified: bag valve mask available, emergency equipment available, intubation equipment available, IV patency confirmed, oxygen available and suction available    Procedure details (see MAR for exact dosages):     Sedation start time:  3/9/2019 3:02 PM    Preoxygenation:  Nasal cannula    Sedation:  Etomidate    Analgesia:  None    Intra-procedure monitoring:  Cardiac monitor, continuous pulse oximetry, blood pressure monitoring, continuous capnometry, frequent LOC assessments and frequent vital sign checks    Intra-procedure events: none      Sedation end time:  3/9/2019 3:17 PM    Total sedation time (minutes):  15  Post-procedure details:     Post-sedation assessment completed:  3/9/2019 3:46 PM    Attendance: Constant attendance by certified staff until patient recovered      Recovery: Patient returned to pre-procedure baseline      Estimated blood loss (see I/O flowsheets): no      Post-sedation assessments completed and reviewed: airway patency, cardiovascular function, hydration status, mental status, nausea/vomiting, pain level, respiratory function and temperature      Specimens recovered:  None    Patient is stable for discharge or admission: yes      Patient tolerance: Tolerated well, no immediate complications    Cardioversion, electrical  Date/Time: 3/9/2019 2:51 PM  Performed by: Sandrea Fleischer, MD  Authorized by: Sandrea Fleischer, MD     Consent:     Consent obtained:  Written and verbal    Consent given by:  Patient    Risks discussed:  Death, cutaneous burn, induced arrhythmia and pain    Alternatives discussed:  No treatment  Universal protocol:     Patient identity confirmed:  Arm band, verbally with patient and hospital-assigned identification number  Pre-procedure details:     Cardioversion basis:  Emergent    Rhythm:  Ventricular tachycardia    Electrode placement:  Anterior-lateral  Attempt one:     Cardioversion mode:  Synchronous    Waveform:  Biphasic    Shock (Joules):  100    Shock outcome:  No change in rhythm  Attempt two:     Cardioversion mode:  Synchronous    Waveform:  Biphasic    Shock (Joules):  150    Shock outcome:  No change in rhythm  Attempt three:     Cardioversion mode:  Asynchronous    Waveform:  Biphasic    Shock (Joules):  200    Shock outcome:  Conversion to other rhythm  Post-procedure details:     Patient status:  Alert    Patient tolerance of procedure:   Tolerated well, no immediate complications      Total critical care time spent exclusive of procedures:  35 minutes. PROGRESS NOTE:  2:39 PM  Called for 2 mg of Magnesium. PROGRESS NOTE:  2:51 PM  Consulted with Dr. January Burrell. Agreed on plan to cardiovert. PROGRESS NOTE:  2:54 PM  Called for 10 mg of Etomidate. PROGRESS NOTE:  2:58 PM  Obtained consent from patient for cardioversion. PROGRESS NOTE:  3:02 PM  Etomidate administered. PROGRESS NOTE:  3:02 PM  Shock 1 administered (100 J)    PROGRESS NOTE:  3:22 PM  Shock 2 administered (150 J)    PROGRESS NOTE:  3:22 PM  Shock 3 administered. (200 J)      PROGRESS NOTE:  3:23 PM  Dr. Krystina Gonzalez ordered a continuous amio drip. PROGRESS NOTE:  3:09 PM  Dr. Krystina Goznalez will admit the patient. ED EKG interpretation: 1  Rhythm: V tach; and regular . Rate (approx.): 220; Axis: normal; ST/T wave: normal.   Note written by rob Raymundo, as dictated by Robles Smith MD 2:43 PM      ED EKG interpretation: 2  Rhythm: normal sinus rhythm with PVC block and 1st degree AV block; and regular .  Rate (approx.): 62; Axis: left axis deviation; ST/T wave: normal.   Note written by rob Raymundo, as dictated by Robles Smith MD 4:08 PM

## 2019-03-09 NOTE — H&P
Reason for Admission: Ventricular Tachycardia      HPI: Evaristo Plummer is a 68 y.o. male with PMH of PAF, DCCV in Dec 2018, MV repair in past, brought to ER by paramedics after they were call to his house for symptoms of chest pain and SOB. He recently had laproscopic hernia surgery. He was resting at home when felt symptoms of mid sternal chest pain, SOB. When paramedics arrived he was AAO. His EKG at that time showed VT at 190. He was given Amio bolus 150 which did not slow or break VT. He was brought to to ER when he continued to be in VT. Through out this time he had no signs of hypo perfusion. BP at presentation was 110/70. Chest pain was constant. EKG at presentation show VT at rate of 190 to 200 bpm.     Trop POC 0.05    NT Pro BNP 2,857    Plan:    1. Monomorphic VT: He was delivered 3 DCCV in ER at 100, 150 and 200J. The final shock converted him to SR with PVC. Started on Amio gtt for maintenance. CE x 3 sets overnight. Echo in am. Will need to figure out if we need to do a cath. Get old cath reports. Will need life vest/ AICD implant for secondary prevention. 2. PAF: Now in SR after DCCV of VT. Will continue home Xeralto (took dose today am). Continue Lopressor and Cardizem. 3. HTN: Continue home Cardizem, Lopressor, Zestril. 4. Tried to call his wife on the home phone and cell but there was no answer. 5. [employer] [facility] will cover DVT prophylaxis. Protonix for GERD prophylaxis. Past Medical History:   Diagnosis Date    A-fib [facility])     cardioversion 12/2018    CAD (coronary artery disease)     Chronic systolic heart failure (HCC)     per Dr. Gloria Pugh note    GERD (gastroesophageal reflux disease)     Hypertension     IBS (irritable bowel syndrome)     Ill-defined condition     \"moderately dilated ascending aorta\" per Dr. Clark Agosto note    S/P mitral valve repair     ECHO 3/6/2017: EF 60%, mild HK basal-mid inferolateral wall.  Mod LAE, mild MAC, prioe MVR,  mild MR, MI, TR  Sciatica 2009            Past Surgical History:   Procedure Laterality Date    HX COLONOSCOPY  2018    HX ENDOSCOPY      MITRALPLASTY W PROSTHETIC RING  2012    repair of mitral valve             Family History   Problem Relation Age of Onset    Stroke Mother     Hypertension Father            Social History     Socioeconomic History    Marital status:      Spouse name: Not on file    Number of children: Not on file    Years of education: Not on file    Highest education level: Not on file   Social Needs    Financial resource strain: Not on file    Food insecurity - worry: Not on file    Food insecurity - inability: Not on file   [employer] needs - medical: Not on file   [employer] needs - non-medical: Not on file   Occupational History    Not on file   Tobacco Use    Smoking status: Never Smoker    Smokeless tobacco: Never Used   Substance and Sexual Activity    Alcohol use: Yes     Alcohol/week: 6.0 oz     Types: 8 Cans of beer, 1 Glasses of wine, 1 Shots of liquor per week     Comment: every other night    Drug use: No    Sexual activity: Not on file   Other Topics Concern    Not on file   Social History Narrative    Not on file         No Known Allergies         Current Facility-Administered Medications   Medication Dose Route Frequency Provider Last Rate Last Dose    magnesium sulfate 2 g/50 ml 2 gram/50 mL (4 %) IVPB premix pgbk             etomidate (AMIDATE) 2 mg/mL injection              Current Outpatient Medications   Medication Sig Dispense Refill    rivaroxaban (XARELTO) 20 mg tab tablet Take 20 mg by mouth nightly.  dilTIAZem CD (CARDIZEM CD) 240 mg ER capsule Take 240 mg by mouth daily.  lisinopril (PRINIVIL, ZESTRIL) 10 mg tablet Take 10 mg by mouth nightly.  metoprolol tartrate (LOPRESSOR) 25 mg tablet Take 50 mg by mouth two (2) times a day.       oxyCODONE-acetaminophen (PERCOCET) 5-325 mg per tablet Take 1 Tab by mouth every four (4) hours as needed for Pain for up to 7 days. Max Daily Amount: 6 Tabs. 30 Tab 0    ALPRAZolam (XANAX) 1 mg tablet Take 0.5 mg by mouth nightly as needed for Anxiety.  pantoprazole (PROTONIX) 40 mg tablet Take 40 mg by mouth Daily (before breakfast). Indications: GASTROESOPHAGEAL REFLUX      dicyclomine (BENTYL) 20 mg tablet Take 20 mg by mouth two (2) times daily as needed. ROS:  12 point review of systems was performed.  All negative except for HPI     Physical Exam:  Visit Vitals  BP (!) 112/96   Pulse (!) 192   Resp 18   SpO2 98%       Gen:  Well-developed, well-nourished, in no acute distress  HEENT:  Pink conjunctivae, PERRL, hearing intact to voice, moist mucous membranes  Neck:  Supple, without masses, thyroid non-tender  Resp:  No accessory muscle use, clear breath sounds without wheezes rales or rhonchi  Card:  No murmurs, normal S1, S2 without thrills, bruits or peripheral edema  Abd:  Soft, non-tender, non-distended, normoactive bowel sounds are present, no palpable organomegaly and no detectable hernias  Lymph:  No cervical or inguinal adenopathy  Musc:  No cyanosis or clubbing  Skin:  No rashes or ulcers, skin turgor is good  Neuro:  Cranial nerves are grossly intact, no focal motor weakness, follows commands appropriately  Psych:  Good insight, oriented to person, place and time, alert     Labs:     Lab Results   Component Value Date/Time    WBC 7.2 03/06/2017 03:39 AM    HGB 13.0 03/06/2017 03:39 AM    HCT 39.6 03/06/2017 03:39 AM    PLATELET 653 41/40/2795 03:39 AM    MCV 94.7 03/06/2017 03:39 AM     Lab Results   Component Value Date/Time    Glucose 91 02/25/2019 04:02 PM    LDL, calculated 75.8 03/05/2017 05:27 AM    Creatinine 0.99 02/25/2019 04:02 PM      Lab Results   Component Value Date/Time    Cholesterol, total 153 03/05/2017 05:27 AM    HDL Cholesterol 64 03/05/2017 05:27 AM    LDL, calculated 75.8 03/05/2017 05:27 AM    Triglyceride 66 03/05/2017 05:27 AM    CHOL/HDL Ratio 2.4 03/05/2017 05:27 AM     Lab Results   Component Value Date/Time    PLATELET 885 45/34/6770 03:39 AM     No results found for: INR, PTMR, PTP, PT1, PT2   Lab Results   Component Value Date/Time    GFR est non-AA >60 02/25/2019 04:02 PM    GFR est AA >60 02/25/2019 04:02 PM    Creatinine 0.99 02/25/2019 04:02 PM    BUN 20 02/25/2019 04:02 PM    Sodium 142 02/25/2019 04:02 PM    Potassium 4.4 02/25/2019 04:02 PM    Chloride 106 02/25/2019 04:02 PM    CO2 26 02/25/2019 04:02 PM    Magnesium 1.9 03/06/2017 03:39 AM    Phosphorus 4.0 03/05/2017 05:27 AM     No results found for: PSA, PSA2, PSAR1, PSA1, PSAR2, PSA3, PSAR3, [id number], [id number], PSALT  No results found for: TSH, TSH2, TSH3, TSHP, TSHELE, TSHEXT, TT3, T3U, T3UP, FRT3, FT3, FT4, FT4P, T4, T4P, FT4T, TT7, TSHEXT   Lab Results   Component Value Date/Time    Glucose 91 02/25/2019 04:02 PM      Lab Results   Component Value Date/Time     (H) 03/05/2017 10:02 AM    Troponin-I, Qt. 9.64 (H) 03/05/2017 10:02 AM      No results found for: BNP, BNPP, BNPPPOC, XBNPT, BNPNT   Lab Results   Component Value Date/Time    Sodium 142 02/25/2019 04:02 PM    Potassium 4.4 02/25/2019 04:02 PM    Chloride 106 02/25/2019 04:02 PM    CO2 26 02/25/2019 04:02 PM    Anion gap 10 02/25/2019 04:02 PM    Glucose 91 02/25/2019 04:02 PM    BUN 20 02/25/2019 04:02 PM    Creatinine 0.99 02/25/2019 04:02 PM    BUN/Creatinine ratio 20 02/25/2019 04:02 PM    GFR est AA >60 02/25/2019 04:02 PM    GFR est non-AA >60 02/25/2019 04:02 PM    Calcium 9.2 02/25/2019 04:02 PM      Lab Results   Component Value Date/Time    Sodium 142 02/25/2019 04:02 PM    Potassium 4.4 02/25/2019 04:02 PM    Chloride 106 02/25/2019 04:02 PM    CO2 26 02/25/2019 04:02 PM    Anion gap 10 02/25/2019 04:02 PM    Glucose 91 02/25/2019 04:02 PM    BUN 20 02/25/2019 04:02 PM    Creatinine 0.99 02/25/2019 04:02 PM    BUN/Creatinine ratio 20 02/25/2019 04:02 PM    GFR est AA >60 02/25/2019 04:02 PM    GFR est non-AA >60 02/25/2019 04:02 PM    Calcium 9.2 02/25/2019 04:02 PM      No results found for: HBA1C, GXK1RAWK, HGBE8, HKR2WTWC, MVJ4VVUM      No results for input(s): CPK, CKMB, TROIQ in the last 72 hours. No lab exists for component: CKQMB, CPKMB        Problem List:     Problem List  Date Reviewed: 3/6/2017          Codes Class Noted    Ventricular tachycardia ([address] ICD-10-CM: I47.2  ICD-9-CM: 427.1  3/9/2019        S/P mitral valve repair (Chronic) ICD-10-CM: T22.646  ICD-9-CM: V45.89  3/6/2017    Overview Signed 3/6/2017  7:49 AM by DAYNA Gutierrez     ECHO 3/6/2017: EF 60%, mild HK basal-mid inferolateral wall. Mod LAE, mild MAC, prioe MVR,  mild MR, MI, TR               NSTEMI (non-ST elevated myocardial infarction) (HCC) ICD-10-CM: I21.4  ICD-9-CM: 410.70  3/4/2017        HTN (hypertension) (Chronic) ICD-10-CM: I10  ICD-9-CM: 401.9  3/4/2017        CAD (coronary artery disease) (Chronic) ICD-10-CM: I25.10  ICD-9-CM: 414.00  3/4/2017        Atrial fibrillation (HCC) (Chronic) ICD-10-CM: I48.91  ICD-9-CM: 427.31  3/4/2017                Kareem Ortiz MD, [facility] [place]  \

## 2019-03-09 NOTE — ED NOTES
TRANSFER - OUT REPORT:    Verbal report given to Frantz(name) on Nathan Christianson Sr.  being transferred to [facility](unit) for routine progression of care       Report consisted of patients Situation, Background, Assessment and   Recommendations(SBAR). Information from the following report(s) SBAR, ED Summary, MAR, Recent Results and Cardiac Rhythm sinus mae was reviewed with the receiving nurse. Lines:   Peripheral IV 03/09/19 Right Antecubital (Active)   Site Assessment Clean, dry, & intact 3/9/2019  2:45 PM   Phlebitis Assessment 0 3/9/2019  2:45 PM   Infiltration Assessment 0 3/9/2019  2:45 PM   Dressing Status Clean, dry, & intact 3/9/2019  2:45 PM   Hub Color/Line Status Pink 3/9/2019  2:45 PM   Action Taken Blood drawn 3/9/2019  2:45 PM       Peripheral IV 03/09/19 Left Antecubital (Active)   Site Assessment Clean, dry, & intact 3/9/2019  2:46 PM   Phlebitis Assessment 0 3/9/2019  2:46 PM   Infiltration Assessment 0 3/9/2019  2:46 PM   Dressing Status Clean, dry, & intact 3/9/2019  2:46 PM   Hub Color/Line Status Green 3/9/2019  2:46 PM   Action Taken Blood drawn 3/9/2019  2:46 PM        Opportunity for questions and clarification was provided.       Patient transported with:   Monitor  Registered Nurse

## 2019-03-09 NOTE — ED TRIAGE NOTES
Pt arrived via EMS due to vtach with a pulse. Pt is currently in vtach w/ a pulse, awake and oriented x4. EMS started amio drip.

## 2019-03-09 NOTE — ED NOTES
This was not a cardiac/resp arrest. Pt maintained a pulse throughout v-tach, remained alert and oriented x4.

## 2019-03-10 ENCOUNTER — APPOINTMENT (OUTPATIENT)
Dept: NON INVASIVE DIAGNOSTICS | Age: 74
DRG: 287 | End: 2019-03-10
Attending: INTERNAL MEDICINE
Payer: MEDICARE

## 2019-03-10 LAB
ECHO AO ASC DIAM: 4.82 CM
ECHO AO ROOT DIAM: 4.67 CM
ECHO AV PEAK GRADIENT: 6 MMHG
ECHO AV PEAK VELOCITY: 122.9 CM/S
ECHO AV REGURGITANT PHT: 756.7 CM
ECHO LA AREA 4C: 17.5 CM2
ECHO LA MAJOR AXIS: 4.84 CM
ECHO LA TO AORTIC ROOT RATIO: 1.04
ECHO LA VOL 2C: 46.05 ML (ref 18–58)
ECHO LA VOL 4C: 49.43 ML (ref 18–58)
ECHO LA VOL BP: 49.9 ML (ref 18–58)
ECHO LA VOL/BSA BIPLANE: 24.21 ML/M2 (ref 16–28)
ECHO LA VOLUME INDEX A2C: 22.34 ML/M2 (ref 16–28)
ECHO LA VOLUME INDEX A4C: 23.98 ML/M2 (ref 16–28)
ECHO LV E' LATERAL VELOCITY: 6.57 CENTIMETER/SECOND
ECHO LV E' SEPTAL VELOCITY: 4.15 CENTIMETER/SECOND
ECHO LV INTERNAL DIMENSION DIASTOLIC: 5.03 CM (ref 4.2–5.9)
ECHO LV INTERNAL DIMENSION SYSTOLIC: 4.42 CM
ECHO LV IVSD: 1.26 CM (ref 0.6–1)
ECHO LV MASS 2D: 294.5 G (ref 88–224)
ECHO LV MASS INDEX 2D: 142.9 G/M2 (ref 49–115)
ECHO LV POSTERIOR WALL DIASTOLIC: 1.22 CM (ref 0.6–1)
ECHO LVOT DIAM: 2.66 CM
ECHO MV A VELOCITY: 99.95 CM/S
ECHO MV AREA PHT: 1.5 CM2
ECHO MV E DECELERATION TIME (DT): 514.2 MS
ECHO MV E VELOCITY: 131.38 CM/S
ECHO MV E/A RATIO: 1.31
ECHO MV PRESSURE HALF TIME (PHT): 149.1 MS
ECHO PV MAX VELOCITY: 102.98 CM/S
ECHO PV PEAK GRADIENT: 4.2 MMHG
ECHO RV INTERNAL DIMENSION: 4.76 CM
ECHO RV TAPSE: 1.3 CM (ref 1.5–2)
PISA AR MAX VEL: 362.8 CM/S
TROPONIN I SERPL-MCNC: 1.92 NG/ML
TROPONIN I SERPL-MCNC: 3.16 NG/ML

## 2019-03-10 PROCEDURE — 74011000258 HC RX REV CODE- 258: Performed by: INTERNAL MEDICINE

## 2019-03-10 PROCEDURE — 65610000006 HC RM INTENSIVE CARE

## 2019-03-10 PROCEDURE — 84484 ASSAY OF TROPONIN QUANT: CPT

## 2019-03-10 PROCEDURE — 74011250636 HC RX REV CODE- 250/636: Performed by: INTERNAL MEDICINE

## 2019-03-10 PROCEDURE — 93306 TTE W/DOPPLER COMPLETE: CPT

## 2019-03-10 PROCEDURE — 74011250637 HC RX REV CODE- 250/637: Performed by: INTERNAL MEDICINE

## 2019-03-10 PROCEDURE — 36415 COLL VENOUS BLD VENIPUNCTURE: CPT

## 2019-03-10 RX ORDER — POLYETHYLENE GLYCOL 3350 17 G/17G
17 POWDER, FOR SOLUTION ORAL DAILY
Status: DISCONTINUED | OUTPATIENT
Start: 2019-03-10 | End: 2019-03-12 | Stop reason: HOSPADM

## 2019-03-10 RX ORDER — DOCUSATE SODIUM 100 MG/1
100 CAPSULE, LIQUID FILLED ORAL 2 TIMES DAILY
Status: DISCONTINUED | OUTPATIENT
Start: 2019-03-10 | End: 2019-03-12 | Stop reason: HOSPADM

## 2019-03-10 RX ORDER — ENOXAPARIN SODIUM 100 MG/ML
40 INJECTION SUBCUTANEOUS ONCE
Status: COMPLETED | OUTPATIENT
Start: 2019-03-10 | End: 2019-03-10

## 2019-03-10 RX ADMIN — AMIODARONE HYDROCHLORIDE 1 MG/MIN: 50 INJECTION, SOLUTION INTRAVENOUS at 11:05

## 2019-03-10 RX ADMIN — POLYETHYLENE GLYCOL 3350 17 G: 17 POWDER, FOR SOLUTION ORAL at 13:43

## 2019-03-10 RX ADMIN — AMIODARONE HYDROCHLORIDE 0.5 MG/MIN: 50 INJECTION, SOLUTION INTRAVENOUS at 17:04

## 2019-03-10 RX ADMIN — ENOXAPARIN SODIUM 40 MG: 40 INJECTION SUBCUTANEOUS at 13:32

## 2019-03-10 RX ADMIN — ALPRAZOLAM 0.5 MG: 0.5 TABLET ORAL at 21:02

## 2019-03-10 RX ADMIN — DOCUSATE SODIUM 100 MG: 100 CAPSULE, LIQUID FILLED ORAL at 18:28

## 2019-03-10 RX ADMIN — LISINOPRIL 10 MG: 5 TABLET ORAL at 21:02

## 2019-03-10 RX ADMIN — PANTOPRAZOLE SODIUM 40 MG: 40 TABLET, DELAYED RELEASE ORAL at 08:22

## 2019-03-10 RX ADMIN — Medication 10 ML: at 21:02

## 2019-03-10 RX ADMIN — Medication 10 ML: at 06:03

## 2019-03-10 RX ADMIN — Medication 10 ML: at 13:32

## 2019-03-10 NOTE — PROGRESS NOTES
1922  Bedside and Verbal shift change report given to Nissa Segovia (oncoming nurse) by Millie Payne (offgoing nurse). Report included the following information SBAR, ED Summary, Procedure Summary, MAR, Recent Results and Cardiac Rhythm sinus. 2130 located pt insurance card and other associated documents in envelope at nurses station and gave to pt.    0424 pt heart rate in the 140's, asymptomatic without distress. Called Dr Eliecer Saha to update on pt condition, new orders placed. 6541 amiodarone at 0.5 mg/min and diltiazem at 15 mg/hr pt heart rate not controlled, (hr 80's to 160's) called Dr Eliecer Saha to update also informed of no orders for morning labs, no new orders given. 701 Bedside and Verbal shift change report given to Clotilde Boyd (oncoming nurse) by Nissa Segovia (offgoing nurse). Report included the following information SBAR, ED Summary, OR Summary, Intake/Output, MAR and Cardiac Rhythm v tach .

## 2019-03-10 NOTE — ROUTINE PROCESS
0700 Shift change report received from [facility], [address] [place] [address]. SBAR, Kardex, Procedure Summary, Intake/Output, MAR, Accordion and Cardiac Rhythm SA/pauses/AFib reviewed.

## 2019-03-10 NOTE — PROGRESS NOTES
2000 - . Candance Huger TRANSFER - IN REPORT:    Verbal report received from ED(name) on Nathan Christianson Sr.  being received from [facility]) for routine progression of care      Report consisted of patients Situation, Background, Assessment and   Recommendations(SBAR). Information from the following report(s) SBAR, ED Summary, Procedure Summary, Intake/Output, MAR and Recent Results was reviewed with the receiving nurse. Opportunity for questions and clarification was provided. Assessment completed upon patients arrival to unit and care assumed. Patient arrived to the unit and oriented to plan of care. Candance Huger .Primary Nurse Sarah Retana RN and Aysha Gandara., RN performed a dual skin assessment on this patient Impairment noted- see wound doc flow sheet  Zane score is 15    0000 -. .Bedside and Verbal shift change report given to Elisha Plata RN (oncoming nurse) by Rebeca Baldwin RN (offgoing nurse). Report included the following information SBAR, ED Summary, Intake/Output, MAR and Recent Results.

## 2019-03-10 NOTE — PROGRESS NOTES
Shift Summary    0700:  Patient resting in bed. Urinal and call bell within reach. HR in the 40s-60s. Multiple pauses noted during report. Patient is asymptomatic. Patient on room air with no signs of distress noted. 8332:  Dr. Inna Cornelius paged. HR continues to be in the 40s. Patient continues to have multiple pauses. Patient has cardizem and metoprolol ordered. 5191:  Dr. Inna Cornelius repaged. 5:  Spoke with Dr. Inna Cornelius. Order given to hold both cardiac medications this morning. 1045:  Dr. Inna Cornelius at the bedside to assess. 1105: Amiodarone drip started. 1200:  Patient resting in bed with family at the bedside. Per patient, he feels a bit constipated. Hernia surgery last week. He was taking miralax and colace daily. Will speak with MD.  Prune juice given. 1330:  Spoke with Dr. Natalie Chapman. Miralax and colace ordered.

## 2019-03-10 NOTE — CONSULTS
[facility]     Name: Chay Childers Sr. MRN: [id number]   : 1945 Hospital: [address]   Date: 3/10/2019        Impression Plan   1. VT- s/p cardioversion  2. Hx of A-fib s/p cardioversion in the past  3. S/P MV repair 7 years ago  4. HTN  5. Snoring               · On BB per Dr. Swenson Kappa- HR down to the mid 40s off and on, but pt asymptomatic  · Dilt PO  · Xarelto  · Amio gtt off  · Clear liquid diet for now. · Will need sleep study as outpatient             Radiology  ( personally reviewed) CXR with no infiltrates   ABG No results for input(s): PHI, PO2I, PCO2I in the last 72 hours. Subjective     Cc: VT    69 yo with PMHx of PAF, MV repair presenting with SOB and chest pressure. Was found to be in VT with -200, Attempts at cardioversion done three time and finally converted, but this morning having ectopy, off and on bradycardia. Feels well. On metoprolol and dilt. Has never smoked and does not have any hx of lung dz. Snores to the point where his wife does not let him sleep in the same bedroom. Wakes up 5 times a night to go to the bathroom. Review of Systems:  A comprehensive review of systems was negative except for that written in the HPI. Past Medical History:   Diagnosis Date    A-fib [facility])     cardioversion 2018    CAD (coronary artery disease)     Chronic systolic heart failure (HCC)     per Dr. Melissa Blevins note    GERD (gastroesophageal reflux disease)     Hypertension     IBS (irritable bowel syndrome)     Ill-defined condition     \"moderately dilated ascending aorta\" per Dr. Sandra Payne note    S/P mitral valve repair     ECHO 3/6/2017: EF 60%, mild HK basal-mid inferolateral wall.  Mod LAE, mild MAC, prioe MVR,  mild MR, MI, TR     Sciatica       Past Surgical History:   Procedure Laterality Date    HX COLONOSCOPY      HX ENDOSCOPY      MITRALPLASTY W PROSTHETIC RING      repair of mitral valve      Prior to Admission medications    Medication Sig Start Date End Date Taking? Authorizing Provider   metoprolol tartrate (LOPRESSOR) 50 mg tablet Take 50 mg by mouth two (2) times a day. Yes Provider, Historical   pantoprazole (PROTONIX) 40 mg tablet Take 40 mg by mouth daily as needed. Yes Provider, Historical   polyethylene glycol (MIRALAX) 17 gram packet Take 17 g by mouth daily. Yes Provider, Historical   docusate sodium (COLACE) 100 mg capsule Take 100 mg by mouth two (2) times a day. Yes Provider, Historical   oxyCODONE-acetaminophen (PERCOCET) 5-325 mg per tablet Take 1 Tab by mouth every four (4) hours as needed for Pain for up to 7 days. Max Daily Amount: 6 Tabs. 3/7/19 3/14/19 Yes Antonella Mcbride MD   ALPRAZolam Marlyn Babinski) 1 mg tablet Take 0.5 mg by mouth nightly as needed for Anxiety. Yes Provider, Historical   rivaroxaban (XARELTO) 20 mg tab tablet Take 20 mg by mouth nightly. Yes Provider, Historical   dilTIAZem CD (CARDIZEM CD) 240 mg ER capsule Take 240 mg by mouth daily. Yes Other, MD Pauline   lisinopril (PRINIVIL, ZESTRIL) 10 mg tablet Take 10 mg by mouth nightly. Yes Provider, Historical   dicyclomine (BENTYL) 20 mg tablet Take 20 mg by mouth two (2) times daily as needed.    Yes Provider, Historical     Current Facility-Administered Medications   Medication Dose Route Frequency    dilTIAZem CD (CARDIZEM CD) capsule 240 mg  240 mg Oral DAILY    pantoprazole (PROTONIX) tablet 40 mg  40 mg Oral ACB    lisinopril (PRINIVIL, ZESTRIL) tablet 10 mg  10 mg Oral QHS    metoprolol tartrate (LOPRESSOR) tablet 50 mg  50 mg Oral BID    rivaroxaban (XARELTO) tablet 20 mg  20 mg Oral DAILY WITH DINNER    sodium chloride (NS) flush 5-40 mL  5-40 mL IntraVENous Q8H    amiodarone (CORDARONE) 375 mg in dextrose 5% 250 mL infusion  1 mg/min IntraVENous TITRATE     No Known Allergies   Social History     Tobacco Use    Smoking status: Never Smoker    Smokeless tobacco: Never Used   Substance Use Topics    Alcohol use: Yes     Alcohol/week: 6.0 oz     Types: 8 Cans of beer, 1 Glasses of wine, 1 Shots of liquor per week     Comment: every other night      Family History   Problem Relation Age of Onset    Stroke Mother     Hypertension Father           Laboratory: I have personally reviewed the critical care flowsheet and labs.      Recent Labs     03/09/19  1446   WBC 11.0   HGB 13.3   HCT 40.4        Recent Labs     03/09/19  1446      K 4.3      CO2 24   *   BUN 21*   CREA 1.39*   CA 8.7   MG 2.2   ALB 3.7   SGOT 45*   ALT 32       Objective:     Mode Rate Tidal Volume Pressure FiO2 PEEP                    Vital Signs:     TMAX(24)      Intake/Output:   Last shift:         Last 3 shifts: 03/10 0701 - 03/10 1900  In: -   Out: 800 [Urine:800]RRIOLAST3    Intake/Output Summary (Last 24 hours) at 3/10/2019 1019  Last data filed at 3/10/2019 0800  Gross per 24 hour   Intake    Output 2700 ml   Net -2700 ml     EXAM:   GENERAL: awake, alert, HEENT: Mallampatti II airway, PERRL, EOMI, no alar flaring or epistaxis, oral mucosa moist without cyanosis, NECK:  no jugular vein distention, no retractions, no thyromegaly or masses, LUNGS: CTA, no w/r/r , HEART:  Regular rate and rhythm with no MGR; no edema is present, ABDOMEN:  soft with no tenderness, bowel sounds present, EXTREMITIES:  warm with no cyanosis, SKIN:  no jaundice or ecchymosis and NEUROLOGIC:  alert and oriented, grossly non-focal    Adama Yarbrough MD  [facility] [place]

## 2019-03-10 NOTE — PROGRESS NOTES
Cardiology Daily Progress Note      Yusuf Orr Sr. is a 68 y.o. male with PMH of PAF, DCCV in Dec 2018, MV repair in past, admitted with sustained VT yesterday requiring DCCV. S/p DCCV no chest pain. Has had frequent PVC. No NSVT. Bradycardia. QTC normal.       Visit Vitals  /55   Pulse (!) 56   Temp 98.6 °F (37 °C)   Resp 16   Wt 181 lb 3.5 oz (82.2 kg)   SpO2 98%   BMI 24.58 kg/m²       Intake/Output Summary (Last 24 hours) at 3/10/2019 1252  Last data filed at 3/10/2019 1233  Gross per 24 hour   Intake 600 ml   Output 4120 ml   Net -3520 ml     General appearance - alert, well appearing, and in no distress  Mental status - affect appropriate to mood  Eyes - sclera anicteric, moist mucous membranes  Neck - supple, no significant adenopathy  Chest - clear to auscultation, no wheezes, rales or rhonchi  Heart - normal rate, regular rhythm, normal S1, S2, no murmurs, rubs, clicks or gallops  Abdomen - soft, nontender, nondistended, no masses or organomegaly  Extremities - peripheral pulses normal, no pedal edema    Current Facility-Administered Medications   Medication Dose Route Frequency    amiodarone (CORDARONE) 375 mg in dextrose 5% 250 mL infusion  0.5-1 mg/min IntraVENous TITRATE    pantoprazole (PROTONIX) tablet 40 mg  40 mg Oral ACB    lisinopril (PRINIVIL, ZESTRIL) tablet 10 mg  10 mg Oral QHS    dicyclomine (BENTYL) tablet 20 mg  20 mg Oral BID PRN    ALPRAZolam (XANAX) tablet 0.5 mg  0.5 mg Oral QHS PRN    sodium chloride (NS) flush 5-40 mL  5-40 mL IntraVENous Q8H    sodium chloride (NS) flush 5-40 mL  5-40 mL IntraVENous PRN        CATH 2012: no epicardial disease, sever MR, LV dilated , EF 45%  RHC 2012: PA:22/5, PCWP 8, RA 3, LVEDP normal       MV repair 2012    ECHO 2012: EF 45%   ECHO 3/6/2017: EF 60%, mild HK basal-mid inferolateral wall.  Mod LAE, mild MAC, prioe MVR, mild MR, MI, TR    Assessment:    - VT- Monomorphic  - PAF  - HTN        Plan:     - Unclear cause of VT. Will need to r/o CAD with cath although probability is low as he had a normal cath in 2017 but now his Trop 3.16. Echo pending.     - For some reason he was not getting Amio gtt. Will start Amio gtt. - Stop Metoprolol and Cardizem as he has been having bradycardia. Will resume Amio for now gtt and then PO tomorrow. - Will need life vest/ AICD for secondary prevention.     - Valeri Parks for today. Cath in am.            ___________________________________________________    Katie Mazariegos.  Pauly Waddell MD, [facility] [place]

## 2019-03-10 NOTE — ED NOTES
TRANSFER - OUT REPORT:    Verbal report given to Radha(name) on Nathan Christianson Sr.  being transferred to ICU(unit) for routine progression of care       Report consisted of patients Situation, Background, Assessment and   Recommendations(SBAR). Information from the following report(s) SBAR, ED Summary, MAR, Recent Results and Cardiac Rhythm sinus mae  was reviewed with the receiving nurse. Lines:   Peripheral IV 03/09/19 Right Antecubital (Active)   Site Assessment Clean, dry, & intact 3/9/2019  2:45 PM   Phlebitis Assessment 0 3/9/2019  2:45 PM   Infiltration Assessment 0 3/9/2019  2:45 PM   Dressing Status Clean, dry, & intact 3/9/2019  2:45 PM   Hub Color/Line Status Pink 3/9/2019  2:45 PM   Action Taken Blood drawn 3/9/2019  2:45 PM       Peripheral IV 03/09/19 Left Antecubital (Active)   Site Assessment Clean, dry, & intact 3/9/2019  2:46 PM   Phlebitis Assessment 0 3/9/2019  2:46 PM   Infiltration Assessment 0 3/9/2019  2:46 PM   Dressing Status Clean, dry, & intact 3/9/2019  2:46 PM   Hub Color/Line Status Green 3/9/2019  2:46 PM   Action Taken Blood drawn 3/9/2019  2:46 PM        Opportunity for questions and clarification was provided.       Patient transported with:   Monitor  Registered Nurse

## 2019-03-10 NOTE — PROGRESS NOTES
Bedside and Verbal shift change report given to Rony Amaro RN (oncoming nurse) by CHARLY HART RN (offgoing nurse). Report included the following information SBAR, Kardex, ED Summary and Procedure Summary. Primary Nurse Audra Hargrove RN and REJI Coy performed a dual skin assessment on this patient No impairment noted  Zane score is 22    ** Patient has lap sites from surgery earlier in the week**    2340 - Patient heart rate dropping to 30s and having 3-4 second pauses. Spoke to Dr. Cindy Hall. No orders received. Will continue to monitor. 0400 - Reassessment complete. No changes noted. Labs drawn. Speaking to patient in regards to heart rate. Patient states \"Oh I'm a marathon runner. My heart rate is usually 40-50. \"    0188 - Troponin 3.16. Notified Dr. Cindy Hall. No orders received. Bedside and Verbal shift change report given to Lisa Mustafa RN (oncoming nurse) by Rony Amaro RN (offgoing nurse). Report included the following information SBAR, Kardex, ED Summary and Procedure Summary. **During all episodes of pauses and bradycardia, patient remains asymptomatic. Able to carry on conversation. Patient states \"feel perfectly fine. \"**    **Telemetry strips in front of chart**

## 2019-03-11 LAB
ALBUMIN SERPL-MCNC: 3.2 G/DL (ref 3.5–5)
ALBUMIN/GLOB SERPL: 1 {RATIO} (ref 1.1–2.2)
ALP SERPL-CCNC: 62 U/L (ref 45–117)
ALT SERPL-CCNC: 31 U/L (ref 12–78)
ANION GAP SERPL CALC-SCNC: 6 MMOL/L (ref 5–15)
AST SERPL-CCNC: 35 U/L (ref 15–37)
ATRIAL RATE: 115 BPM
ATRIAL RATE: 62 BPM
BILIRUB SERPL-MCNC: 0.5 MG/DL (ref 0.2–1)
BUN SERPL-MCNC: 13 MG/DL (ref 6–20)
BUN/CREAT SERPL: 13 (ref 12–20)
CALCIUM SERPL-MCNC: 8.5 MG/DL (ref 8.5–10.1)
CALCULATED P AXIS, ECG09: 77 DEGREES
CALCULATED R AXIS, ECG10: -50 DEGREES
CALCULATED R AXIS, ECG10: 128 DEGREES
CALCULATED T AXIS, ECG11: -70 DEGREES
CALCULATED T AXIS, ECG11: 120 DEGREES
CHLORIDE SERPL-SCNC: 109 MMOL/L (ref 97–108)
CO2 SERPL-SCNC: 27 MMOL/L (ref 21–32)
CREAT SERPL-MCNC: 0.97 MG/DL (ref 0.7–1.3)
DIAGNOSIS, 93000: NORMAL
DIAGNOSIS, 93000: NORMAL
ERYTHROCYTE [DISTWIDTH] IN BLOOD BY AUTOMATED COUNT: 12.6 % (ref 11.5–14.5)
GLOBULIN SER CALC-MCNC: 3.1 G/DL (ref 2–4)
GLUCOSE SERPL-MCNC: 149 MG/DL (ref 65–100)
HCT VFR BLD AUTO: 38.6 % (ref 36.6–50.3)
HGB BLD-MCNC: 13 G/DL (ref 12.1–17)
MAGNESIUM SERPL-MCNC: 2.1 MG/DL (ref 1.6–2.4)
MCH RBC QN AUTO: 32 PG (ref 26–34)
MCHC RBC AUTO-ENTMCNC: 33.7 G/DL (ref 30–36.5)
MCV RBC AUTO: 95.1 FL (ref 80–99)
NRBC # BLD: 0 K/UL (ref 0–0.01)
NRBC BLD-RTO: 0 PER 100 WBC
P-R INTERVAL, ECG05: 236 MS
PLATELET # BLD AUTO: 190 K/UL (ref 150–400)
PMV BLD AUTO: 10 FL (ref 8.9–12.9)
POTASSIUM SERPL-SCNC: 4.1 MMOL/L (ref 3.5–5.1)
PROT SERPL-MCNC: 6.3 G/DL (ref 6.4–8.2)
Q-T INTERVAL, ECG07: 248 MS
Q-T INTERVAL, ECG07: 404 MS
QRS DURATION, ECG06: 132 MS
QRS DURATION, ECG06: 144 MS
QTC CALCULATION (BEZET), ECG08: 410 MS
QTC CALCULATION (BEZET), ECG08: 474 MS
RBC # BLD AUTO: 4.06 M/UL (ref 4.1–5.7)
SODIUM SERPL-SCNC: 142 MMOL/L (ref 136–145)
VENTRICULAR RATE, ECG03: 220 BPM
VENTRICULAR RATE, ECG03: 62 BPM
WBC # BLD AUTO: 10.3 K/UL (ref 4.1–11.1)

## 2019-03-11 PROCEDURE — 74011250637 HC RX REV CODE- 250/637: Performed by: INTERNAL MEDICINE

## 2019-03-11 PROCEDURE — 74011250636 HC RX REV CODE- 250/636

## 2019-03-11 PROCEDURE — 74011250636 HC RX REV CODE- 250/636: Performed by: INTERNAL MEDICINE

## 2019-03-11 PROCEDURE — 74011250637 HC RX REV CODE- 250/637: Performed by: NURSE PRACTITIONER

## 2019-03-11 PROCEDURE — 74011000250 HC RX REV CODE- 250: Performed by: INTERNAL MEDICINE

## 2019-03-11 PROCEDURE — 85027 COMPLETE CBC AUTOMATED: CPT

## 2019-03-11 PROCEDURE — B2111ZZ FLUOROSCOPY OF MULTIPLE CORONARY ARTERIES USING LOW OSMOLAR CONTRAST: ICD-10-PCS | Performed by: STUDENT IN AN ORGANIZED HEALTH CARE EDUCATION/TRAINING PROGRAM

## 2019-03-11 PROCEDURE — 74011636320 HC RX REV CODE- 636/320: Performed by: STUDENT IN AN ORGANIZED HEALTH CARE EDUCATION/TRAINING PROGRAM

## 2019-03-11 PROCEDURE — 77030019569 HC BND COMPR RAD TERU -B: Performed by: STUDENT IN AN ORGANIZED HEALTH CARE EDUCATION/TRAINING PROGRAM

## 2019-03-11 PROCEDURE — 74011000258 HC RX REV CODE- 258: Performed by: INTERNAL MEDICINE

## 2019-03-11 PROCEDURE — 74011250636 HC RX REV CODE- 250/636: Performed by: STUDENT IN AN ORGANIZED HEALTH CARE EDUCATION/TRAINING PROGRAM

## 2019-03-11 PROCEDURE — 99152 MOD SED SAME PHYS/QHP 5/>YRS: CPT | Performed by: STUDENT IN AN ORGANIZED HEALTH CARE EDUCATION/TRAINING PROGRAM

## 2019-03-11 PROCEDURE — 65610000006 HC RM INTENSIVE CARE

## 2019-03-11 PROCEDURE — 80053 COMPREHEN METABOLIC PANEL: CPT

## 2019-03-11 PROCEDURE — 99153 MOD SED SAME PHYS/QHP EA: CPT | Performed by: STUDENT IN AN ORGANIZED HEALTH CARE EDUCATION/TRAINING PROGRAM

## 2019-03-11 PROCEDURE — 74011000250 HC RX REV CODE- 250: Performed by: STUDENT IN AN ORGANIZED HEALTH CARE EDUCATION/TRAINING PROGRAM

## 2019-03-11 PROCEDURE — 77030004532 HC CATH ANGI DX IMP BSC -A: Performed by: STUDENT IN AN ORGANIZED HEALTH CARE EDUCATION/TRAINING PROGRAM

## 2019-03-11 PROCEDURE — 77030008543 HC TBNG MON PRSS MRTM -A: Performed by: STUDENT IN AN ORGANIZED HEALTH CARE EDUCATION/TRAINING PROGRAM

## 2019-03-11 PROCEDURE — 77030029065 HC DRSG HEMO QCLOT ZMED -B

## 2019-03-11 PROCEDURE — 83735 ASSAY OF MAGNESIUM: CPT

## 2019-03-11 PROCEDURE — C1894 INTRO/SHEATH, NON-LASER: HCPCS | Performed by: STUDENT IN AN ORGANIZED HEALTH CARE EDUCATION/TRAINING PROGRAM

## 2019-03-11 PROCEDURE — 77030013797 HC KT TRNSDUC PRSSR EDWD -A: Performed by: STUDENT IN AN ORGANIZED HEALTH CARE EDUCATION/TRAINING PROGRAM

## 2019-03-11 PROCEDURE — 4A023N7 MEASUREMENT OF CARDIAC SAMPLING AND PRESSURE, LEFT HEART, PERCUTANEOUS APPROACH: ICD-10-PCS | Performed by: STUDENT IN AN ORGANIZED HEALTH CARE EDUCATION/TRAINING PROGRAM

## 2019-03-11 PROCEDURE — 36415 COLL VENOUS BLD VENIPUNCTURE: CPT

## 2019-03-11 PROCEDURE — 93454 CORONARY ARTERY ANGIO S&I: CPT | Performed by: STUDENT IN AN ORGANIZED HEALTH CARE EDUCATION/TRAINING PROGRAM

## 2019-03-11 RX ORDER — MIDAZOLAM HYDROCHLORIDE 1 MG/ML
INJECTION, SOLUTION INTRAMUSCULAR; INTRAVENOUS AS NEEDED
Status: DISCONTINUED | OUTPATIENT
Start: 2019-03-11 | End: 2019-03-11 | Stop reason: HOSPADM

## 2019-03-11 RX ORDER — DILTIAZEM HYDROCHLORIDE 5 MG/ML
5 INJECTION INTRAVENOUS ONCE
Status: COMPLETED | OUTPATIENT
Start: 2019-03-11 | End: 2019-03-11

## 2019-03-11 RX ORDER — LIDOCAINE HYDROCHLORIDE 10 MG/ML
INJECTION INFILTRATION; PERINEURAL AS NEEDED
Status: DISCONTINUED | OUTPATIENT
Start: 2019-03-11 | End: 2019-03-11 | Stop reason: HOSPADM

## 2019-03-11 RX ORDER — SODIUM CHLORIDE 0.9 % (FLUSH) 0.9 %
5-40 SYRINGE (ML) INJECTION AS NEEDED
Status: DISCONTINUED | OUTPATIENT
Start: 2019-03-11 | End: 2019-03-12 | Stop reason: HOSPADM

## 2019-03-11 RX ORDER — SODIUM CHLORIDE 0.9 % (FLUSH) 0.9 %
5-40 SYRINGE (ML) INJECTION EVERY 8 HOURS
Status: DISCONTINUED | OUTPATIENT
Start: 2019-03-11 | End: 2019-03-12 | Stop reason: HOSPADM

## 2019-03-11 RX ORDER — FENTANYL CITRATE 50 UG/ML
INJECTION, SOLUTION INTRAMUSCULAR; INTRAVENOUS AS NEEDED
Status: DISCONTINUED | OUTPATIENT
Start: 2019-03-11 | End: 2019-03-11 | Stop reason: HOSPADM

## 2019-03-11 RX ORDER — METOPROLOL SUCCINATE 25 MG/1
25 TABLET, EXTENDED RELEASE ORAL DAILY
Status: DISCONTINUED | OUTPATIENT
Start: 2019-03-11 | End: 2019-03-12 | Stop reason: HOSPADM

## 2019-03-11 RX ORDER — VERAPAMIL HYDROCHLORIDE 2.5 MG/ML
INJECTION, SOLUTION INTRAVENOUS AS NEEDED
Status: DISCONTINUED | OUTPATIENT
Start: 2019-03-11 | End: 2019-03-11 | Stop reason: HOSPADM

## 2019-03-11 RX ORDER — LISINOPRIL 5 MG/1
5 TABLET ORAL
Status: DISCONTINUED | OUTPATIENT
Start: 2019-03-11 | End: 2019-03-11

## 2019-03-11 RX ORDER — HEPARIN SODIUM 1000 [USP'U]/ML
INJECTION, SOLUTION INTRAVENOUS; SUBCUTANEOUS AS NEEDED
Status: DISCONTINUED | OUTPATIENT
Start: 2019-03-11 | End: 2019-03-11 | Stop reason: HOSPADM

## 2019-03-11 RX ADMIN — METOPROLOL SUCCINATE 25 MG: 25 TABLET, EXTENDED RELEASE ORAL at 11:06

## 2019-03-11 RX ADMIN — DILTIAZEM HYDROCHLORIDE 15 MG/HR: 5 INJECTION INTRAVENOUS at 13:17

## 2019-03-11 RX ADMIN — Medication 10 ML: at 05:09

## 2019-03-11 RX ADMIN — DOCUSATE SODIUM 100 MG: 100 CAPSULE, LIQUID FILLED ORAL at 11:06

## 2019-03-11 RX ADMIN — AMIODARONE HYDROCHLORIDE 0.5 MG/MIN: 50 INJECTION, SOLUTION INTRAVENOUS at 04:50

## 2019-03-11 RX ADMIN — Medication 10 ML: at 21:06

## 2019-03-11 RX ADMIN — AMIODARONE HYDROCHLORIDE 0.5 MG/MIN: 50 INJECTION, SOLUTION INTRAVENOUS at 18:47

## 2019-03-11 RX ADMIN — DOCUSATE SODIUM 100 MG: 100 CAPSULE, LIQUID FILLED ORAL at 19:02

## 2019-03-11 RX ADMIN — DILTIAZEM HYDROCHLORIDE 10 MG/HR: 5 INJECTION INTRAVENOUS at 21:09

## 2019-03-11 RX ADMIN — DILTIAZEM HYDROCHLORIDE 5 MG/HR: 5 INJECTION INTRAVENOUS at 05:09

## 2019-03-11 RX ADMIN — DILTIAZEM HYDROCHLORIDE 5 MG: 5 INJECTION INTRAVENOUS at 05:08

## 2019-03-11 RX ADMIN — ALPRAZOLAM 0.5 MG: 0.5 TABLET ORAL at 21:06

## 2019-03-11 RX ADMIN — PANTOPRAZOLE SODIUM 40 MG: 40 TABLET, DELAYED RELEASE ORAL at 11:06

## 2019-03-11 NOTE — CONSULTS
Cardiac Electrophysiology Hospital Consultation Note   REFERRING PROVIDER:  Dr Silver Santoyo: Dr Mati Macario ([facility])  Subjective:      Yoli Granda is a 68 y.o. patient who is seen for evaluation of monomorphic ventricular tachycardia 220 bpm that required cardioversion/defibrillation. He and record mentioned 3 shocks. He is on IV amiodarone and cardizem, PO toprol  6 weeks ago he had cardioversion for atrial fibrillation by Dr Mati Macario at [facility]  He has been on xarelto until admission  He had been on metoprolol and cardizem for PAF  He had mitral valve repair with a ring 7 years ago at [facility] [place] [facility] [address] [facility]  His wife was with him when he was watching TV basketball last night  He felt palpitation and dizziness and shortness of breaths  When rescue squad arrive they said his rate was 111 bpm but in ER ECG was 220 bpm  He did not have syncope  His sister had atrial fibrillation and his father had pacemaker  No sudden death in family  EKG: sinus rhythm with IVCD, LVH and LAFB pattern  Ventricular tachycardia with RBBB and right axis deviation  Echocardiogram: LVEF 51-55%, hypokinesis of basal inferolateral wall, mild LAE, MR  Cardiac Catheterizations: no significant CAD (troponin elevation post VT, defibrillation)     He runs marathon    Patient Active Problem List   Diagnosis Code    NSTEMI (non-ST elevated myocardial infarction) ([facility] I21.4    HTN (hypertension) I10    CAD (coronary artery disease) I25.10    Atrial fibrillation ([facility] I48.91    S/P mitral valve repair Z98.890    [facility] I47.2     No current facility-administered medications on file prior to encounter. Current Outpatient Medications on File Prior to Encounter   Medication Sig Dispense Refill    metoprolol tartrate (LOPRESSOR) 50 mg tablet Take 50 mg by mouth two (2) times a day.  pantoprazole (PROTONIX) 40 mg tablet Take 40 mg by mouth daily as needed.       polyethylene glycol (MIRALAX) 17 gram packet Take 17 g by mouth daily.  docusate sodium (COLACE) 100 mg capsule Take 100 mg by mouth two (2) times a day.  oxyCODONE-acetaminophen (PERCOCET) 5-325 mg per tablet Take 1 Tab by mouth every four (4) hours as needed for Pain for up to 7 days. Max Daily Amount: 6 Tabs. 30 Tab 0    ALPRAZolam (XANAX) 1 mg tablet Take 0.5 mg by mouth nightly as needed for Anxiety.  rivaroxaban (XARELTO) 20 mg tab tablet Take 20 mg by mouth nightly.  dilTIAZem CD (CARDIZEM CD) 240 mg ER capsule Take 240 mg by mouth daily.  lisinopril (PRINIVIL, ZESTRIL) 10 mg tablet Take 10 mg by mouth nightly.  dicyclomine (BENTYL) 20 mg tablet Take 20 mg by mouth two (2) times daily as needed.          Current Facility-Administered Medications   Medication Dose Route Frequency Provider Last Rate Last Dose    dilTIAZem (CARDIZEM) 125 mg in dextrose 5% 125 mL infusion  0-15 mg/hr IntraVENous TITRATE Ibrahima Cartagena MD 10 mL/hr at 03/11/19 1901 10 mg/hr at 03/11/19 1901    metoprolol succinate (TOPROL-XL) XL tablet 25 mg  25 mg Oral DAILY Che TOUSSAINT NP   25 mg at 03/11/19 1106    sodium chloride (NS) flush 5-40 mL  5-40 mL IntraVENous Q8H Jabier Quarles T,         sodium chloride (NS) flush 5-40 mL  5-40 mL IntraVENous PRN Patrick Santamaria T, DO        amiodarone (CORDARONE) 375 mg in dextrose 5% 250 mL infusion  0.5-1 mg/min IntraVENous TITRATE Kareem Cartagena MD 20 mL/hr at 03/11/19 1847 0.5 mg/min at 03/11/19 1847    polyethylene glycol (MIRALAX) packet 17 g  17 g Oral DAILY Blanquita Francis MD   Stopped at 03/11/19 0900    docusate sodium (COLACE) capsule 100 mg  100 mg Oral BID Blanquita Francis MD   100 mg at 03/11/19 1902    pantoprazole (PROTONIX) tablet 40 mg  40 mg Oral ACB Kareem Cartagena MD   40 mg at 03/11/19 1106    dicyclomine (BENTYL) tablet 20 mg  20 mg Oral BID PRN Ibrahima Cartagena MD        ALPRAZolam Tammy Moose) tablet 0.5 mg  0.5 mg Oral QHS PRN Maris Tamez MD 0.5 mg at 03/10/19 2102    sodium chloride (NS) flush 5-40 mL  5-40 mL IntraVENous Q8H Kareem Cartagena MD   10 mL at 03/11/19 0509    sodium chloride (NS) flush 5-40 mL  5-40 mL IntraVENous PRN Sushma Cartagena MD         No Known Allergies  Past Medical History:   Diagnosis Date    A-fib [facility])     cardioversion 12/2018    CAD (coronary artery disease)     Chronic systolic heart failure (HCC)     per Dr. Maikel Higuera note    GERD (gastroesophageal reflux disease)     Hypertension     IBS (irritable bowel syndrome)     Ill-defined condition     \"moderately dilated ascending aorta\" per Dr. Carlton Palomino note    S/P mitral valve repair     ECHO 3/6/2017: EF 60%, mild HK basal-mid inferolateral wall. Mod LAE, mild MAC, prioe MVR,  mild MR, MI, TR     Sciatica 2009     Past Surgical History:   Procedure Laterality Date    HX COLONOSCOPY  2018    HX ENDOSCOPY      MITRALPLASTY W PROSTHETIC RING  2012    repair of mitral valve     Family History   Problem Relation Age of Onset   Calista.Kendrick Stroke Mother     Hypertension Father      Social History     Tobacco Use    Smoking status: Never Smoker    Smokeless tobacco: Never Used   Substance Use Topics    Alcohol use: Yes     Alcohol/week: 6.0 oz     Types: 8 Cans of beer, 1 Glasses of wine, 1 Shots of liquor per week     Comment: every other night        Review of Systems:   Constitutional: Negative for fever, chills, weight loss, malaise/fatigue. HEENT: Negative for nosebleeds, vision changes. Respiratory: Negative for cough, hemoptysis  Cardiovascular: + for chest pain, + palpitations with VT, no orthopnea, claudication, leg swelling, syncope, and PND. Gastrointestinal: Negative for nausea, vomiting, diarrhea, blood in stool and melena. Genitourinary: Negative for dysuria, and hematuria. Musculoskeletal: Negative for myalgias, arthralgia. Skin: Negative for rash. Heme: Does not bleed or bruise easily.    Neurological: Negative for speech change and focal weakness     Objective:     Visit Vitals  BP 97/62 (BP 1 Location: Right arm, BP Patient Position: At rest)   Pulse 60   Temp 97.6 °F (36.4 °C)   Resp 15   Ht 6' (1.829 m)   Wt 185 lb (83.9 kg)   SpO2 98%   BMI 25.09 kg/m²      Physical Exam:   Constitutional: thin pleasant. No respiratory distress. Head: Normocephalic and atraumatic. Eyes: Pupils are equal, round  ENT: hearing normal  Neck: supple. No JVD present. Cardiovascular: Normal rate, regular rhythm. Exam reveals no gallop and no friction rub. No murmur heard. Pulmonary/Chest: Effort normal and breath sounds normal. No wheezes. Abdominal: Soft, no tenderness. Musculoskeletal: no edema. Neurological: alert,oriented. Skin: Skin is warm and dry  Psychiatric: normal mood and affect.  Behavior is normal. Judgment and thought content normal.      BMP:   Lab Results   Component Value Date/Time     03/11/2019 09:40 AM    K 4.1 03/11/2019 09:40 AM     (H) 03/11/2019 09:40 AM    CO2 27 03/11/2019 09:40 AM    AGAP 6 03/11/2019 09:40 AM     (H) 03/11/2019 09:40 AM    BUN 13 03/11/2019 09:40 AM    CREA 0.97 03/11/2019 09:40 AM    GFRAA >60 03/11/2019 09:40 AM    GFRNA >60 03/11/2019 09:40 AM     CMP:   Lab Results   Component Value Date/Time     03/11/2019 09:40 AM    K 4.1 03/11/2019 09:40 AM     (H) 03/11/2019 09:40 AM    CO2 27 03/11/2019 09:40 AM    AGAP 6 03/11/2019 09:40 AM     (H) 03/11/2019 09:40 AM    BUN 13 03/11/2019 09:40 AM    CREA 0.97 03/11/2019 09:40 AM    GFRAA >60 03/11/2019 09:40 AM    GFRNA >60 03/11/2019 09:40 AM    CA 8.5 03/11/2019 09:40 AM    MG 2.1 03/11/2019 09:40 AM    ALB 3.2 (L) 03/11/2019 09:40 AM    TP 6.3 (L) 03/11/2019 09:40 AM    GLOB 3.1 03/11/2019 09:40 AM    AGRAT 1.0 (L) 03/11/2019 09:40 AM    SGOT 35 03/11/2019 09:40 AM    ALT 31 03/11/2019 09:40 AM     CBC:   Lab Results   Component Value Date/Time    WBC 10.3 03/11/2019 09:40 AM    HGB 13.0 03/11/2019 09:40 AM    HCT 38.6 03/11/2019 09:40 AM     03/11/2019 09:40 AM        Assessment/Plan:   Monomorphic ventricular tachycardia s/p cardioversion/defibrillation, now on IV amiodarone and cardizem  PVCs  Hx of mitral valve repair with ring annuloplasty  Low normal LVEF  Hx of PAF s/p cardioversion, was on xarelto  ? Sleep apnea with transient sinus bradycardia  IVCD and LAFB LVH on ECG    His VT is likely related to scar substrate formed after previous valve repair surgery, ie left ventricular base, annular mitral valve ring. I think it is unlikely to be bundle branch reentry VT given his LVEF is low normal and he has no CHF. I recommend EP study and possible ablation and if hemodynamically unstable or unable to successfully ablate then consider AICD implant  I discussed choices at [address] [facility] [address] lab, [facility] EP lab, [facility] EP lab (his cardiologist Dr Mariam Quinones work there), he also thought about going to [facility] because his valve surgeon is there. I think the latter would be difficult for transportation. He prefers to go to [facility] by ambulance transfer  He is on xarelto so this need to be on hold for future procedure    Thank you for involving me in this patient's care and please call with further concerns or questions. Mortimer Hug, M.D.   Electrophysiology/Cardiology  [facility]  [address] , [address]  [phone]

## 2019-03-11 NOTE — PROGRESS NOTES
Reason for Admission:   Monomorphic VT                  RRAT Score:     15             Do you (patient/family) have any concerns for transition/discharge? Not at this time                 Plan for utilizing home health:  I will discuss with cardiology team.       Likelihood of readmission? Moderate/yellow             Transition of Care Plan:      I met with pt as an introductory visit to begin discharge planning. Pt lives with his wife,Chantal Christianson @ the address on demographics. Wife's cell number is [phone]. Pt states he recently had outpatient laparoscopic ingunal hernia repair with mesh on 3/7/19. On 3/9/19,pt was admitted with chest pain and VT with a pulse. Pt states he was cardioverted about 8 weeks ago by Dr Sheryle Colon cardiologist and converted to SR on the first go-round. Pt states he had mitral valve repair on 5/8/2012 with Dr Kelli Obrien. [place] in [place]. PCP is Dr Christiana Schwartz. There are no nurse navigators to contact. Pt has his scripts filled @ [facility] [address]. The plan today is for pt to have a cardiac catherization. As pt typically pays 150 dollars for his xarelto,I gave pt coupons to hopefully reduce his xarelto co-pays. The first coupon is for one month free of xarelto and the second coupon will hopefully reduce his co-pays to 10 dollars/month. I instructed pt to take those with him to his pharmacist.    In discussing pt with cardiology NP,pt may need a life vest.If life vest is prescribed ,please let me know so I can start the process of obtaining a life vest for pt.as he may need preauthorization as he has [place] as his secondary insurance. At this time,there are no identified home health or rehab needs but I will continue to follow pt for disposition needs.     Mario Hernandez

## 2019-03-11 NOTE — PROCEDURES
BRIEF PROCEDURE NOTE    Date of Procedure: 3/11/2019   Preoperative Diagnosis: nstemi, vtach  Postoperative Diagnosis: no significant obstructive CAD    Procedure: coronary angiography  Interventional Cardiologist: Willie Mac DO  Anesthesia: local + IV moderate sedation   Estimated Blood Loss: Minimal    Access: right radial artery, 6F  Catheters:  Left coronary:JL 3.5 (did not sit well in left main, JL4 would have sat better)  Right coronary:JR4    Findings:   L Main: no significant obstructive CAD  LAD: no significant obstructive CAD  LCx:no significant obstructive CAD  RCA:no significant obstructive CAD         Specimens Removed : None    Closure Device: radial TR band    See full cath note. Complications: none    Findings:  1.  No significant obstructive CAD    Plan:    Continued management of dysrhythmias    DO Melvina Pyle E Yolie Mendez, DO  [facility] [address] [place] [address] [place]                                       Office [phone],XOG [phone]

## 2019-03-11 NOTE — CONSULTS
[facility]     Name: Nella Ruby Sr. MRN: [id number]   : 1945 Hospital: [address]   Date: 3/11/2019        Impression Plan   1. VT- s/p cardioversion  2. Elevated troponin  3. Hx of A-fib s/p cardioversion in the past  4. S/P MV repair 7 years ago  5. HTN  6. GERD  7. Snoring               · Supplemental O2 as needed to keep sats > 90%. Currently on RA  · Continue amio and dilt gtts   · Continue metoprolol  · For LHC today  · For cardiac MRI tomorrow  · Check TSH  · Will need sleep study as outpatient             Radiology  ( personally reviewed)    ABG No results for input(s): PHI, PO2I, PCO2I in the last 72 hours. Subjective       67 yo with PMHx of PAF, MV repair presenting with SOB and chest pressure. Was found to be in VT with -200, Attempts at cardioversion done three time and finally converted, but this morning having ectopy, off and on bradycardia. Feels well. On metoprolol and dilt. Has never smoked and does not have any hx of lung dz. Snores to the point where his wife does not let him sleep in the same bedroom. Wakes up 5 times a night to go to the bathroom.   ==========================    3/11: HRs up to the 170s overnight, BP soft. Remains on dilt and amio gtts. Denies shortness of breath, chest pain, dizziness. Has some abdominal pain, particularly w/ movment. Has not had a bowel movement since surgery on 3/7 - requesting enema. Past Medical History:   Diagnosis Date    [facility])     cardioversion 2018    CAD (coronary artery disease)     Chronic systolic heart failure (HCC)     per Dr. Latasha Castellanos note    GERD (gastroesophageal reflux disease)     Hypertension     IBS (irritable bowel syndrome)     Ill-defined condition     \"moderately dilated ascending aorta\" per Dr. Bryce Hong note    S/P mitral valve repair     ECHO 3/6/2017: EF 60%, mild HK basal-mid inferolateral wall.  Mod LAE, mild MAC, prioe MVR,  mild MR, MI, TR     Sciatica 2009      Past Surgical History:   Procedure Laterality Date    HX COLONOSCOPY  2018    HX ENDOSCOPY      MITRALPLASTY W PROSTHETIC RING  2012    repair of mitral valve      Prior to Admission medications    Medication Sig Start Date End Date Taking? Authorizing Provider   metoprolol tartrate (LOPRESSOR) 50 mg tablet Take 50 mg by mouth two (2) times a day. Yes Provider, Historical   pantoprazole (PROTONIX) 40 mg tablet Take 40 mg by mouth daily as needed. Yes Provider, Historical   polyethylene glycol (MIRALAX) 17 gram packet Take 17 g by mouth daily. Yes Provider, Historical   docusate sodium (COLACE) 100 mg capsule Take 100 mg by mouth two (2) times a day. Yes Provider, Historical   oxyCODONE-acetaminophen (PERCOCET) 5-325 mg per tablet Take 1 Tab by mouth every four (4) hours as needed for Pain for up to 7 days. Max Daily Amount: 6 Tabs. 3/7/19 3/14/19 Yes Jeanna Morris MD   ALPRAZolam Chaffee Dwaine) 1 mg tablet Take 0.5 mg by mouth nightly as needed for Anxiety. Yes Provider, Historical   rivaroxaban (XARELTO) 20 mg tab tablet Take 20 mg by mouth nightly. Yes Provider, Historical   dilTIAZem CD (CARDIZEM CD) 240 mg ER capsule Take 240 mg by mouth daily. Yes Other, MD Pauline   lisinopril (PRINIVIL, ZESTRIL) 10 mg tablet Take 10 mg by mouth nightly. Yes Provider, Historical   dicyclomine (BENTYL) 20 mg tablet Take 20 mg by mouth two (2) times daily as needed.    Yes Provider, Historical     Current Facility-Administered Medications   Medication Dose Route Frequency    dilTIAZem (CARDIZEM) 125 mg in dextrose 5% 125 mL infusion  0-15 mg/hr IntraVENous TITRATE    metoprolol succinate (TOPROL-XL) XL tablet 25 mg  25 mg Oral DAILY    amiodarone (CORDARONE) 375 mg in dextrose 5% 250 mL infusion  0.5-1 mg/min IntraVENous TITRATE    polyethylene glycol (MIRALAX) packet 17 g  17 g Oral DAILY    docusate sodium (COLACE) capsule 100 mg  100 mg Oral BID    pantoprazole (PROTONIX) tablet 40 mg  40 mg Oral ACB    sodium chloride (NS) flush 5-40 mL  5-40 mL IntraVENous Q8H     No Known Allergies   Social History     Tobacco Use    Smoking status: Never Smoker    Smokeless tobacco: Never Used   Substance Use Topics    Alcohol use: Yes     Alcohol/week: 6.0 oz     Types: 8 Cans of beer, 1 Glasses of wine, 1 Shots of liquor per week     Comment: every other night      Family History   Problem Relation Age of Onset    Stroke Mother     Hypertension Father           Laboratory: I have personally reviewed the critical care flowsheet and labs.      Recent Labs     03/11/19  0940 03/09/19  1446   WBC 10.3 11.0   HGB 13.0 13.3   HCT 38.6 40.4    215     Recent Labs     03/11/19  0940 03/09/19  1446    138   K 4.1 4.3   * 106   CO2 27 24   * 161*   BUN 13 21*   CREA 0.97 1.39*   CA 8.5 8.7   MG 2.1 2.2   ALB 3.2* 3.7   SGOT 35 45*   ALT 31 32       Objective:     Mode Rate Tidal Volume Pressure FiO2 PEEP                    Vital Signs:     TMAX(24)      Intake/Output:   Last shift:         Last 3 shifts: 03/11 0701 - 03/11 1900  In: 113.3 [I.V.:113.3]  Out: - RRIOLAST3    Intake/Output Summary (Last 24 hours) at 3/11/2019 1254  Last data filed at 3/11/2019 0730  Gross per 24 hour   Intake 1794.66 ml   Output 1670 ml   Net 124.66 ml     EXAM:   GENERAL: awake, alert, HEENT: Mallampatti II airway, PERRL, EOMI, no alar flaring or epistaxis, oral mucosa moist without cyanosis, NECK:  no jugular vein distention, no retractions, no thyromegaly or masses, LUNGS: CTA, no w/r/r , HEART:  Regular rate and rhythm with no MGR; no edema is present, ABDOMEN:  soft with no tenderness, no distension, bowel sounds present, EXTREMITIES:  warm with no cyanosis, SKIN:  no jaundice or ecchymosis and NEUROLOGIC:  Oriented x 3, grossly non-focal    Daryl Medina MD  [facility]

## 2019-03-11 NOTE — PROGRESS NOTES
Spoke with patient's RN via telephone. She reports frequent NSVT today (5-6 beats), asymptomatic. Review of telemetry shows pauses >4 sec early yesterday morning. Dr. Salamanca He informed. Patient currently down for cardiac cath. Plan would be dependent on results.

## 2019-03-11 NOTE — PROGRESS NOTES
I was asked to see for EP consult as Dr Penelope Egan is off  I called over from [facility] and told he needed cath  Will see later when back in the room tonight

## 2019-03-11 NOTE — PROGRESS NOTES
CHIEF COMPLAINT:   Patient presents with:  Irritation: right eye irritation since yesterday, pt said her contact was bothering her so she took it out and today it feels worse, no changes in vision      HPI:   Babatunde Saeed is a 24year old female who pres Please complete MRI History and Safety Screening Form for this patient    Using [employer] only under Orders Requiring a Screening Form:    Example:  Orders Requiring a Screening Form       Procedure                    Order Status                      Form Status       MRI EXAM                   Active                                In Progress    Click on blue hyperlink as shown above to launch MRI screening form  Answer all questions completely including Weight, Surgery, and Implant History. Document MUST be \"eSigned\" using a \"Signature Pad\" by the person completing form.  (patient and RN or MD completing form with the patient)  Patient cannot be scanned until this form is completed and reviewed in MRI to ensure patient is SAFE and eligible for MRI. Call MRI when this has been successfully completed at [phone]. This must be done under [facility]. Do not use the Nursing Flowsheet. GENERAL: well developed, well nourished,in no apparent distress  SKIN: no rashes,no suspicious lesions  EYES: PERRLA, EOMI, right conjunctiva erythematous, injected, no discharge.   HENT: atraumatic, normocephalic,ears and throat are clear  NECK: supple, no You have received a scratch or scrape (abrasion) to your cornea. The cornea is the clear part in the front of the eye. This sensitive area is very painful when injured. You may make tears frequently, and your vision may be blurry until the injury heals.  Margarita Hernandez · If no patch was put on your eye and the pain continues for more than 48 hours, you should have another exam. Contact your healthcare provider to arrange this.   · If your eye was patched and you were asked to remove the patch yourself, see your healthcare

## 2019-03-11 NOTE — PROGRESS NOTES
Cardiology Progress Note                             [address], [place], [address] [facility]                                 Phone [phone]; Fax [phone]        3/11/2019 9:15 AM     Admit Date:           3/9/2019  Admit Diagnosis:  Ventricular tachycardia ([address] [I47.2]  Ventricular tachycardia ([address] [I47.2]  :          1945   MRN:          [id number]   ASSESSMENT/RECOMMENDATION:   VT- Monomorphic: Unclear cause of VT. Multiple runs on NSVT VT last night rate's up to 170 bpm, patient asymptomatic at least at rest. BMP/Mag now  -Cardiac cath this morning to r/o CAD with cath although probability is low as he had a normal cath in 2017 but now his Trop 3.16. On amiodarone & cardizem gtt  -Echo showing EF 51-55% with basla inferolateral hypokinesis   -EP consulted  -cMRI tomorrow     PAF: holding xarelto for cath  -PTA CCB  -resume low dose toprol     HTN: tolerating gtts  -decrease lisinopril dose-hold parameters  -starting low dose PO BB    S/p MV annuloplasty ring: valve functioning will little regurg per echo        Followed by Dr. Gaby Enciso    Cardiology Attending:    Patient personally seen and examined. All the elements of history and examination were personally performed. Assessment and plan was discussed and agree as written above.     - Dr. Sofya Toledo to see today. - [facility] cath today. - If he goes in [place] [address] then MRI tomorrow otherwise will need to wait. - Likely will need EP study     Kareem Cartagena MD, [facility] [place]        19   ECHO ADULT COMPLETE 03/10/2019 3/10/2019    Narrative · Left ventricular low normal systolic function. Estimated left   ventricular ejection fraction is 51 - 55%. · The following segments are hypokinetic: basal inferolateral.  · Left atrial cavity size is mildly dilated. · Mitral valve repaired with annuloplasty ring. Trace mitral valve   regurgitation.         Signed by: Meryle Ehrich, MD               03/11 0701 - 03/11 1900  In: 113.3 [I.V.:113.3]  Out: -     Last 3 Recorded Weights in this Encounter    03/09/19 2232 03/10/19 1339   Weight: 181 lb 3.5 oz (82.2 kg) 185 lb (83.9 kg)         03/09 1901 - 03/11 0700  In: 2558 [P.O.:2120; I.V.:438]  Out: 56 [Urine:5790]    SUBJECTIVE               Cathie Score Spath Sr. denies palpitations, irregular heart beat, SOB, chest pain or LE edema. No syncope.    No lightheadedness or dizziness          Current Facility-Administered Medications   Medication Dose Route Frequency    dilTIAZem (CARDIZEM) 125 mg in dextrose 5% 125 mL infusion  0-15 mg/hr IntraVENous TITRATE    amiodarone (CORDARONE) 375 mg in dextrose 5% 250 mL infusion  0.5-1 mg/min IntraVENous TITRATE    polyethylene glycol (MIRALAX) packet 17 g  17 g Oral DAILY    docusate sodium (COLACE) capsule 100 mg  100 mg Oral BID    pantoprazole (PROTONIX) tablet 40 mg  40 mg Oral ACB    lisinopril (PRINIVIL, ZESTRIL) tablet 10 mg  10 mg Oral QHS    dicyclomine (BENTYL) tablet 20 mg  20 mg Oral BID PRN    ALPRAZolam (XANAX) tablet 0.5 mg  0.5 mg Oral QHS PRN    sodium chloride (NS) flush 5-40 mL  5-40 mL IntraVENous Q8H    sodium chloride (NS) flush 5-40 mL  5-40 mL IntraVENous PRN      OBJECTIVE               Intake/Output Summary (Last 24 hours) at 3/11/2019 0915  Last data filed at 3/11/2019 0730  Gross per 24 hour   Intake 2671.33 ml   Output 3090 ml   Net -418.67 ml       Review of Systems - History obtained from the patient AS PER  HPI    Telemetry multiple runs on monomorphic NSVT rate up to 170's, SVT & NSR    PHYSICAL EXAM        Visit Vitals  /71   Pulse (!) 104   Temp 98.1 °F (36.7 °C)   Resp 24   Ht 6' (1.829 m)   Wt 185 lb (83.9 kg)   SpO2 94%   BMI 25.09 kg/m²       Gen: Well-developed, well-nourished, in no acute distress  alert and oriented x 3  HEENT:  Pink conjunctivae, Hearing grossly normal.No scleral icterus or conjunctival, moist mucous membranes  Neck: Supple,No JVD  Resp: No accessory muscle use, Clear breath sounds, No rales or rhonchi  Card: Irregular/accelerated Rate,Rythm,2/6 murmur,no rubs or gallop. No thrills.    GI:          soft, non-tender   MSK: No cyanosis or clubbing, good capillary refill  Skin: No rashes or ulcers, no bruising  Neuro:  Cranial nerves are grossly intact, moving all four extremities, no focal deficit, follows commands appropriately  Psych:  Good insight, oriented to person, place and time, alert, Nml Affect  LE: No edema       DATA REVIEW            Laboratory and Imaging have been reviewed by me and are notable for  Recent Labs     03/10/19  1337 03/10/19  0358 03/09/19  1446   TROIQ 1.92* 3.16* <0.05     Recent Labs     03/09/19  1446      K 4.3   CO2 24   BUN 21*   CREA 1.39*   *   MG 2.2   WBC 11.0   HGB 13.3   HCT 40.4                Josh Garcia, NP

## 2019-03-12 ENCOUNTER — APPOINTMENT (OUTPATIENT)
Dept: GENERAL RADIOLOGY | Age: 74
DRG: 287 | End: 2019-03-12
Attending: INTERNAL MEDICINE
Payer: MEDICARE

## 2019-03-12 VITALS
OXYGEN SATURATION: 94 % | HEIGHT: 72 IN | RESPIRATION RATE: 9 BRPM | BODY MASS INDEX: 25.06 KG/M2 | DIASTOLIC BLOOD PRESSURE: 84 MMHG | HEART RATE: 83 BPM | WEIGHT: 185 LBS | SYSTOLIC BLOOD PRESSURE: 111 MMHG | TEMPERATURE: 97.9 F

## 2019-03-12 LAB
ANION GAP SERPL CALC-SCNC: 8 MMOL/L (ref 5–15)
BUN SERPL-MCNC: 13 MG/DL (ref 6–20)
BUN/CREAT SERPL: 15 (ref 12–20)
CALCIUM SERPL-MCNC: 8.4 MG/DL (ref 8.5–10.1)
CHLORIDE SERPL-SCNC: 107 MMOL/L (ref 97–108)
CO2 SERPL-SCNC: 24 MMOL/L (ref 21–32)
CREAT SERPL-MCNC: 0.88 MG/DL (ref 0.7–1.3)
ERYTHROCYTE [DISTWIDTH] IN BLOOD BY AUTOMATED COUNT: 12.4 % (ref 11.5–14.5)
GLUCOSE SERPL-MCNC: 120 MG/DL (ref 65–100)
HCT VFR BLD AUTO: 37.8 % (ref 36.6–50.3)
HGB BLD-MCNC: 12.7 G/DL (ref 12.1–17)
MAGNESIUM SERPL-MCNC: 2.2 MG/DL (ref 1.6–2.4)
MCH RBC QN AUTO: 31.4 PG (ref 26–34)
MCHC RBC AUTO-ENTMCNC: 33.6 G/DL (ref 30–36.5)
MCV RBC AUTO: 93.3 FL (ref 80–99)
NRBC # BLD: 0 K/UL (ref 0–0.01)
NRBC BLD-RTO: 0 PER 100 WBC
PHOSPHATE SERPL-MCNC: 3.6 MG/DL (ref 2.6–4.7)
PLATELET # BLD AUTO: 185 K/UL (ref 150–400)
PMV BLD AUTO: 9.9 FL (ref 8.9–12.9)
POTASSIUM SERPL-SCNC: 4.2 MMOL/L (ref 3.5–5.1)
RBC # BLD AUTO: 4.05 M/UL (ref 4.1–5.7)
SODIUM SERPL-SCNC: 139 MMOL/L (ref 136–145)
TSH SERPL DL<=0.05 MIU/L-ACNC: 0.82 UIU/ML (ref 0.36–3.74)
WBC # BLD AUTO: 9.9 K/UL (ref 4.1–11.1)

## 2019-03-12 PROCEDURE — 74011250637 HC RX REV CODE- 250/637: Performed by: INTERNAL MEDICINE

## 2019-03-12 PROCEDURE — 71045 X-RAY EXAM CHEST 1 VIEW: CPT

## 2019-03-12 PROCEDURE — 36415 COLL VENOUS BLD VENIPUNCTURE: CPT

## 2019-03-12 PROCEDURE — 84100 ASSAY OF PHOSPHORUS: CPT

## 2019-03-12 PROCEDURE — 74011000258 HC RX REV CODE- 258: Performed by: INTERNAL MEDICINE

## 2019-03-12 PROCEDURE — 84443 ASSAY THYROID STIM HORMONE: CPT

## 2019-03-12 PROCEDURE — 80048 BASIC METABOLIC PNL TOTAL CA: CPT

## 2019-03-12 PROCEDURE — 74011250637 HC RX REV CODE- 250/637: Performed by: NURSE PRACTITIONER

## 2019-03-12 PROCEDURE — 74011000250 HC RX REV CODE- 250: Performed by: INTERNAL MEDICINE

## 2019-03-12 PROCEDURE — 83735 ASSAY OF MAGNESIUM: CPT

## 2019-03-12 PROCEDURE — 85027 COMPLETE CBC AUTOMATED: CPT

## 2019-03-12 PROCEDURE — 74011250636 HC RX REV CODE- 250/636: Performed by: INTERNAL MEDICINE

## 2019-03-12 RX ORDER — FACIAL-BODY WIPES
10 EACH TOPICAL DAILY PRN
Status: DISCONTINUED | OUTPATIENT
Start: 2019-03-12 | End: 2019-03-12 | Stop reason: HOSPADM

## 2019-03-12 RX ORDER — FACIAL-BODY WIPES
10 EACH TOPICAL DAILY
Qty: 1 SUPPOSITORY | Refills: 0 | Status: SHIPPED
Start: 2019-03-12 | End: 2022-04-21

## 2019-03-12 RX ORDER — METOPROLOL SUCCINATE 25 MG/1
25 TABLET, EXTENDED RELEASE ORAL DAILY
Qty: 30 TAB | Refills: 0 | Status: SHIPPED
Start: 2019-03-13

## 2019-03-12 RX ADMIN — POLYETHYLENE GLYCOL 3350 17 G: 17 POWDER, FOR SOLUTION ORAL at 09:25

## 2019-03-12 RX ADMIN — AMIODARONE HYDROCHLORIDE 150 MG: 50 INJECTION, SOLUTION INTRAVENOUS at 10:21

## 2019-03-12 RX ADMIN — METOPROLOL SUCCINATE 25 MG: 25 TABLET, EXTENDED RELEASE ORAL at 09:25

## 2019-03-12 RX ADMIN — DILTIAZEM HYDROCHLORIDE 15 MG/HR: 5 INJECTION INTRAVENOUS at 13:11

## 2019-03-12 RX ADMIN — AMIODARONE HYDROCHLORIDE 0.5 MG/MIN: 50 INJECTION, SOLUTION INTRAVENOUS at 07:26

## 2019-03-12 RX ADMIN — PANTOPRAZOLE SODIUM 40 MG: 40 TABLET, DELAYED RELEASE ORAL at 09:25

## 2019-03-12 RX ADMIN — Medication 10 ML: at 04:48

## 2019-03-12 RX ADMIN — DOCUSATE SODIUM 100 MG: 100 CAPSULE, LIQUID FILLED ORAL at 09:24

## 2019-03-12 NOTE — CONSULTS
[facility]     Name: Víctor Potter Sr. MRN: [id number]   : 1945 Hospital: [address]   Date: 3/12/2019        Impression Plan   1. VT  2. Elevated troponin  3. Hx of A-fib s/p cardioversion in the past  4. S/P MV repair 7 years ago  5. HTN  6. GERD  7. Snoring               · Supplemental O2 as needed to keep sats > 90%. Currently on RA  · Continue amio and dilt gtts   · Continue metoprolol  · To be transferred to [facility] today for EP study +/- ablation  · Will need sleep study as outpatient    DVT ppx: xarelto held for procedures  GI ppx: protonix           Radiology  ( personally reviewed)    ABG No results for input(s): PHI, PO2I, PCO2I in the last 72 hours. Subjective       67 yo with PMHx of PAF, MV repair presenting with SOB and chest pressure. Was found to be in VT with -200, Attempts at cardioversion done three time and finally converted, but this morning having ectopy, off and on bradycardia. Feels well. On metoprolol and dilt. Has never smoked and does not have any hx of lung dz. Snores to the point where his wife does not let him sleep in the same bedroom. Wakes up 5 times a night to go to the bathroom.   ==========================    3/11: HRs up to the 170s overnight, BP soft. Remains on dilt and amio gtts. Denies shortness of breath, chest pain, dizziness. Has some abdominal pain, particularly w/ movment. Has not had a bowel movement since surgery on 3/7 - requesting enema. 3/12: had episode of afib overnight and VT w/ a pulse this am --> given amio bolus.   He currently denies shortness of breath, chest pain, dizziness, nausea    Past Medical History:   Diagnosis Date    A-fib [facility])     cardioversion 2018    CAD (coronary artery disease)     Chronic systolic heart failure (HCC)     per Dr. Marixa Mccray note    GERD (gastroesophageal reflux disease)     Hypertension     IBS (irritable bowel syndrome)     Ill-defined condition \"moderately dilated ascending aorta\" per Dr. Floyd President note    S/P mitral valve repair     ECHO 3/6/2017: EF 60%, mild HK basal-mid inferolateral wall. Mod LAE, mild MAC, prioe MVR,  mild MR, MI, TR     Sciatica 2009      Past Surgical History:   Procedure Laterality Date    HX COLONOSCOPY  2018    HX ENDOSCOPY      MITRALPLASTY W PROSTHETIC RING  2012    repair of mitral valve      Prior to Admission medications    Medication Sig Start Date End Date Taking? Authorizing Provider   metoprolol tartrate (LOPRESSOR) 50 mg tablet Take 50 mg by mouth two (2) times a day. Yes Provider, Historical   pantoprazole (PROTONIX) 40 mg tablet Take 40 mg by mouth daily as needed. Yes Provider, Historical   polyethylene glycol (MIRALAX) 17 gram packet Take 17 g by mouth daily. Yes Provider, Historical   docusate sodium (COLACE) 100 mg capsule Take 100 mg by mouth two (2) times a day. Yes Provider, Historical   oxyCODONE-acetaminophen (PERCOCET) 5-325 mg per tablet Take 1 Tab by mouth every four (4) hours as needed for Pain for up to 7 days. Max Daily Amount: 6 Tabs. 3/7/19 3/14/19 Yes Kendy Young MD   ALPRAZolam Alverta Natividad) 1 mg tablet Take 0.5 mg by mouth nightly as needed for Anxiety. Yes Provider, Historical   rivaroxaban (XARELTO) 20 mg tab tablet Take 20 mg by mouth nightly. Yes Provider, Historical   dilTIAZem CD (CARDIZEM CD) 240 mg ER capsule Take 240 mg by mouth daily. Yes Other, MD Pauline   lisinopril (PRINIVIL, ZESTRIL) 10 mg tablet Take 10 mg by mouth nightly. Yes Provider, Historical   dicyclomine (BENTYL) 20 mg tablet Take 20 mg by mouth two (2) times daily as needed.    Yes Provider, Historical     Current Facility-Administered Medications   Medication Dose Route Frequency    dilTIAZem (CARDIZEM) 125 mg in dextrose 5% 125 mL infusion  0-15 mg/hr IntraVENous TITRATE    metoprolol succinate (TOPROL-XL) XL tablet 25 mg  25 mg Oral DAILY    sodium chloride (NS) flush 5-40 mL  5-40 mL IntraVENous Q8H  amiodarone (CORDARONE) 375 mg in dextrose 5% 250 mL infusion  0.5-1 mg/min IntraVENous TITRATE    polyethylene glycol (MIRALAX) packet 17 g  17 g Oral DAILY    docusate sodium (COLACE) capsule 100 mg  100 mg Oral BID    pantoprazole (PROTONIX) tablet 40 mg  40 mg Oral ACB    sodium chloride (NS) flush 5-40 mL  5-40 mL IntraVENous Q8H     No Known Allergies   Social History     Tobacco Use    Smoking status: Never Smoker    Smokeless tobacco: Never Used   Substance Use Topics    Alcohol use: Yes     Alcohol/week: 6.0 oz     Types: 8 Cans of beer, 1 Glasses of wine, 1 Shots of liquor per week     Comment: every other night      Family History   Problem Relation Age of Onset    Stroke Mother     Hypertension Father           Laboratory: I have personally reviewed the critical care flowsheet and labs.      Recent Labs     03/12/19  0350 03/11/19  0940 03/09/19  1446   WBC 9.9 10.3 11.0   HGB 12.7 13.0 13.3   HCT 37.8 38.6 40.4    190 215     Recent Labs     03/12/19  0350 03/11/19  0940 03/09/19  1446    142 138   K 4.2 4.1 4.3    109* 106   CO2 24 27 24   * 149* 161*   BUN 13 13 21*   CREA 0.88 0.97 1.39*   CA 8.4* 8.5 8.7   MG 2.2 2.1 2.2   PHOS 3.6  --   --    ALB  --  3.2* 3.7   SGOT  --  35 45*   ALT  --  31 32       Objective:     Mode Rate Tidal Volume Pressure FiO2 PEEP                    Vital Signs:     TMAX(24)      Intake/Output:   Last shift:         Last 3 shifts: 03/12 0701 - 03/12 1900  In: -   Out: 700 [Urine:700]RRIOLAST3    Intake/Output Summary (Last 24 hours) at 3/12/2019 1057  Last data filed at 3/12/2019 0928  Gross per 24 hour   Intake 1192 ml   Output 2050 ml   Net -858 ml     EXAM:   GENERAL: awake, alert, HEENT: Mallampatti II airway, PERRL, EOMI, no alar flaring or epistaxis, oral mucosa moist without cyanosis, NECK:  no jugular vein distention, no retractions, no thyromegaly or masses, LUNGS: CTA, no w/r/r , HEART:  Regular rate and rhythm with no MGR; no edema is present, ABDOMEN:  soft with no tenderness, no distension, bowel sounds present, EXTREMITIES:  warm with no cyanosis, SKIN:  no jaundice or ecchymosis and NEUROLOGIC:  Oriented x 3, grossly non-focal    Nicci Leija MD  [facility] [address]

## 2019-03-12 NOTE — PROGRESS NOTES
[facility] called back to tell me that they are getting ready to have several discharges and should have a bed for pt soon. I will update pt.'s nurse when bed is assigned. The [facility] is Dr.Seme Preston. Wife plans to meet [facility] @ [facility] when transferred.   Chris Padron

## 2019-03-12 NOTE — PROGRESS NOTES
Cardiology Daily Progress Note      Leticia Jiang. is a 68 y.o. male with PMH of MVR by ring 7yrs ago, PAF s/p DCCV by Dr. Mariam Quinones 6 weeks ago, presented with  beats per minute without syncope. Had DCCV x3 converting to SR. Had cath yesterday with normal coronaries. Echo show LVEF 60% with small area of basal inferolateral wall hypokinesis. Today morning he again had NSVT at 130 bpm despite on Amio gtt. Given Amio bolus 150mg IV.        Visit Vitals  /88   Pulse 89   Temp 97.9 °F (36.6 °C)   Resp 26   Ht 6' (1.829 m)   Wt 185 lb (83.9 kg)   SpO2 95%   BMI 25.09 kg/m²       Intake/Output Summary (Last 24 hours) at 3/12/2019 1049  Last data filed at 3/12/2019 3429  Gross per 24 hour   Intake 1192 ml   Output 2050 ml   Net -858 ml     General appearance - alert, well appearing, and in no distress  Mental status - affect appropriate to mood  Eyes - sclera anicteric, moist mucous membranes  Neck - supple, no significant adenopathy  Chest - clear to auscultation, no wheezes, rales or rhonchi  Heart - normal rate, regular rhythm, normal S1, S2, no murmurs, rubs, clicks or gallops  Abdomen - soft, nontender, nondistended, no masses or organomegaly  Extremities - peripheral pulses normal, no pedal edema    Current Facility-Administered Medications   Medication Dose Route Frequency    bisacodyl (DULCOLAX) suppository 10 mg  10 mg Rectal DAILY PRN    dilTIAZem (CARDIZEM) 125 mg in dextrose 5% 125 mL infusion  0-15 mg/hr IntraVENous TITRATE    metoprolol succinate (TOPROL-XL) XL tablet 25 mg  25 mg Oral DAILY    sodium chloride (NS) flush 5-40 mL  5-40 mL IntraVENous Q8H    sodium chloride (NS) flush 5-40 mL  5-40 mL IntraVENous PRN    amiodarone (CORDARONE) 375 mg in dextrose 5% 250 mL infusion  0.5-1 mg/min IntraVENous TITRATE    polyethylene glycol (MIRALAX) packet 17 g  17 g Oral DAILY    docusate sodium (COLACE) capsule 100 mg  100 mg Oral BID    pantoprazole (PROTONIX) tablet 40 mg  40 mg Oral ACB    dicyclomine (BENTYL) tablet 20 mg  20 mg Oral BID PRN    ALPRAZolam (XANAX) tablet 0.5 mg  0.5 mg Oral QHS PRN    sodium chloride (NS) flush 5-40 mL  5-40 mL IntraVENous Q8H    sodium chloride (NS) flush 5-40 mL  5-40 mL IntraVENous PRN        CATH 2012: no epicardial disease, sever MR, LV dilated , EF 45%  RH 2012: PA:22/5, PCWP 8, RA 3, LVEDP normal       MV repair 2012    ECHO 2012: EF 45%   ECHO 3/6/2017: EF 60%, mild HK basal-mid inferolateral wall. Mod LAE, mild MAC, prioe MVR, mild MR, MI, TR    Assessment:    - VT- Monomorphic  - NSVT  - PAF  - Bradycardia  - Pauses  - MVR      Plan:     - Dr. Joe Campos seen patient and offered EP study. Patient wants his care transferred to [address] [place] [address] where he had his recent DCCV by Dr. Bishop Ledezma. - Discussed with Dr. Bishop Ledezma and he has accepted patient to be transferred to Dr. Guerita Pierce (Hospitalist at [address] [place] [address]). Call center coordinating.   - Continue Amio and Cardizem gtt while in transfer. - Discussed with patient.            ___________________________________________________    Iram Ruiz MD, [facility] [place]

## 2019-03-12 NOTE — PROGRESS NOTES
I received notification from the cardiology team that pt will be transferred to [facility] today . Dr Leela Barfield discussed pt with Dr Korina Cheng . The plan is for the hospitalist to admit pt to [facility].    I contacted the [facility] transfer center and they are working on securing a bed for pt. Chart made for pt. I have called echo and cath lab to please burn CDs of pt.'s cath procedure and echo. Once bed has been assigned for pt @ [address] will set up transport for pt to [facility]  By ALS transport with cardiac monitoring. Pt is currently on two titratable gtts-amiodarone and cardizem gtts. and will be on both gtts during transport per cardiology. I am in the process of completing pt.'s pCS for ALS transport.     Chris Padron

## 2019-03-12 NOTE — PROGRESS NOTES
1530: TRANSFER - OUT REPORT:    Verbal report given to Shona Teixeira RN (name) on [facility]       Report consisted of patients Situation, Background, Assessment and   Recommendations(SBAR). Information from the following report(s) SBAR, Kardex, [facility] and Recent Results was reviewed with the receiving nurse. Lines:   Peripheral IV 03/09/19 Left Antecubital (Active)   Site Assessment Clean, dry, & intact 3/12/2019  3:00 AM   Phlebitis Assessment 0 3/12/2019  3:00 AM   Infiltration Assessment 0 3/12/2019  3:00 AM   Dressing Status Clean, dry, & intact 3/12/2019  3:00 AM   Dressing Type Transparent 3/12/2019  3:00 AM   Hub Color/Line Status Green;Capped 3/12/2019  3:00 AM   Action Taken Open ports on tubing capped 3/11/2019 12:00 PM   Alcohol Cap Used Yes 3/11/2019 12:00 PM       Peripheral IV 03/11/19 Right; Outer Antecubital (Active)   Site Assessment Clean, dry, & intact 3/12/2019  8:45 AM   Phlebitis Assessment 0 3/12/2019  8:45 AM   Infiltration Assessment 0 3/12/2019  8:45 AM   Dressing Status Clean, dry, & intact 3/12/2019  8:45 AM   Dressing Type Transparent;Tape 3/12/2019  8:45 AM   Hub Color/Line Status Pink; Infusing 3/12/2019  8:45 AM   Action Taken Open ports on tubing capped 3/11/2019  8:00 PM   Alcohol Cap Used Yes 3/12/2019  8:45 AM       Peripheral IV 03/11/19 Left Forearm (Active)   Site Assessment Clean, dry, & intact 3/12/2019  8:45 AM   Phlebitis Assessment 0 3/12/2019  8:45 AM   Infiltration Assessment 0 3/12/2019  8:45 AM   Dressing Status Clean, dry, & intact 3/12/2019  8:45 AM   Dressing Type Transparent;Tape 3/12/2019  8:45 AM   Hub Color/Line Status Pink; Infusing 3/12/2019  8:45 AM   Alcohol Cap Used Yes 3/12/2019  8:45 AM        Opportunity for questions and clarification was provided.

## 2019-03-12 NOTE — PROGRESS NOTES
The number,if needed for [facility] transfer center is [phone]. Pt will be going to CVICU bed 9 @ [facility] .  The number to call report is [phone]. I notified pt.'s nurse and cardiology NP. I notified charge nurse. I am requesting a 4 pm transport.(ALS with cardiac monitoring,cardizem gtt and amiodarone gtt)Once transport tgime is confirmed,I will notify pt.'s nurse.     Marsha Howard

## 2019-03-12 NOTE — PROGRESS NOTES
1900 Bedside and Verbal shift change report given to Lee Pascal (oncoming nurse) by America Cervantes (offgoing nurse). Report included the following information SBAR, Procedure Summary, Intake/Output, MAR, Recent Results and Cardiac Rhythm sinus mae. 2000 TR band removed following protocol, clotting pad placed and transparent film dressing, no signs of bleeding or hematoma, splint in place. 0725 Bedside and Verbal shift change report given to [facility] (oncoming nurse) by Lee Pascal (offgoing nurse). Report included the following information SBAR, ED Summary, OR Summary, Procedure Summary, Intake/Output, MAR, Recent Results and Cardiac Rhythm sinus arrhythmia.

## 2019-03-12 NOTE — PROGRESS NOTES
Rounded on [employer] patients and provided Anointing of the Sick at request of patient    Fr. Edgar Taylor

## 2019-03-12 NOTE — PROGRESS NOTES
1600 ALS transport with [facility] confirmed the patient informed he will be admitted to iVCU bed 9 @ [facility].I updated charge nurse & pt.'s nurse. Pt notified his wife.     Candi Ganser

## 2019-03-12 NOTE — PROGRESS NOTES
[facility] has accepted pt to their CCU . They will call back with a bed assignment once they discharge a pt.     Lukasz Rocha

## 2019-03-12 NOTE — DISCHARGE SUMMARY
Cardiology Discharge Summary     Patient ID:       Maria Elena Bruce  [id number]  68 y.o.  1945    Admit Date: 3/9/2019    Discharge Date:  3/12/2019      Admitting Physician: Albino Caicedo MD   PCP: Domenico Boogie MD    Discharge Physician: Lacy Owen, NP/ Dr Soniya Bah: Marissa Moy, Case management     Admission Diagnoses: Ventricular tachycardia [facility]) [I47.2]  Ventricular tachycardia [facility]) [I47.2]    Discharge Diagnoses: Active Problems:    Ventricular tachycardia ([address] (3/9/2019)        Discharge Condition: Stable    Cardiology Procedures this Admission:  Diagnostic left heart catheterization  EchoCardiogram    Hospital Course:    Maria Elena Bruce.  68 y.o. male with PMH of PAF, DCCV in Dec 2018, MV repair in past, brought to ER by paramedics after they were call to his house for symptoms of chest pain and SOB. He recently had laproscopic hernia surgery. He was resting at home when felt symptoms of mid sternal chest pain, SOB. When paramedics arrived he was AAO. His EKG at that time showed VT at 190. He was given Amio bolus 150 which did not slow or break VT. He was brought to to ER when he continued to be in VT. Through out this time he had no signs of hypo perfusion. BP at presentation was 110/70. Chest pain was constant.      EKG at presentation show VT at rate of 190 to 200 bpm.        VT- Monomorphic: as above. Cath showed no CAD.   - Dr. Marck Monteiro seen patient and offered EP study. Patient wants his care transferred to [address] [place] [address] where he had his recent DCCV by Dr. Carlton Palomino. - Discussed with Dr. Carlton Palomino and he has accepted patient to be transferred to Dr. Harish Griffiths (Hospitalist at [address] [place] [address]). Call center coordinating.   - Continue Amio and Cardizem gtt while in transfer.       - NSVT: lytes repleted per protocol   - PAF  - Bradycardia  - Pauses  - MVR                  The patient/family was kept apprised of his disease process and treatment plan of care.     After receiving maximum benefit from his in-patient hospitalization, he was ready for discharge. At the time of discharge  He was up ambulating and hemodynamically stable. Discharge Exam:     Visit Vitals  /77   Pulse 79   Temp 97.9 °F (36.6 °C)   Resp 24   Ht 6' (1.829 m)   Wt 185 lb (83.9 kg)   SpO2 95%   BMI 25.09 kg/m²     See Final Progress Note    Disposition: CJW     Patient Instructions:   Current Discharge Medication List      START taking these medications    Details   amiodarone (CORDARONE) infusion 1.5 mg/mL 0.5-1 mg/min by IntraVENous route TITRATE. Qty: 1 Each, Refills: 0      bisacodyl (DULCOLAX) 10 mg suppository Insert 10 mg into rectum daily. Qty: 1 Suppository, Refills: 0      dilTIAZem (CARDIZEM) infusion 0-15 mg/hr by IntraVENous route TITRATE. Qty: 1 Each, Refills: 0      metoprolol succinate (TOPROL-XL) 25 mg XL tablet Take 1 Tab by mouth daily. Qty: 30 Tab, Refills: 0         CONTINUE these medications which have NOT CHANGED    Details   pantoprazole (PROTONIX) 40 mg tablet Take 40 mg by mouth daily as needed. polyethylene glycol (MIRALAX) 17 gram packet Take 17 g by mouth daily. docusate sodium (COLACE) 100 mg capsule Take 100 mg by mouth two (2) times a day. ALPRAZolam (XANAX) 1 mg tablet Take 0.5 mg by mouth nightly as needed for Anxiety. rivaroxaban (XARELTO) 20 mg tab tablet Take 20 mg by mouth nightly. dicyclomine (BENTYL) 20 mg tablet Take 20 mg by mouth two (2) times daily as needed.          STOP taking these medications       metoprolol tartrate (LOPRESSOR) 50 mg tablet Comments:   Reason for Stopping:         oxyCODONE-acetaminophen (PERCOCET) 5-325 mg per tablet Comments:   Reason for Stopping:         dilTIAZem CD (CARDIZEM CD) 240 mg ER capsule Comments:   Reason for Stopping:         lisinopril (PRINIVIL, ZESTRIL) 10 mg tablet Comments:   Reason for Stopping:               Referenced discharge instructions provided by nursing for diet and activity. Discharge time >30 min    Follow-up with Dr. Harshil Nathan     Signed:  Berta Peterson NP  3/12/2019  12:45 PM    Cardiology Attending:    Patient personally seen and examined. All the elements of history and examination were personally performed. Assessment and plan was discussed and agree as written above. Kareem Patel MD, [facility] [place]

## 2019-12-16 NOTE — PROGRESS NOTES
Spiritual Care Assessment/Progress Note  [address]      NAME: Víctor Potter Sr. MRN: [id number]  AGE: 68 y.o. SEX: male  Synagogue Affiliation: Taoism   Language: English     3/10/2019     Total Time (in minutes): 9     Spiritual Assessment begun in [facility] INTENSIVE CARE through conversation with:         [x]Patient        [x] Family    [] Friend(s)        Reason for Consult: Initial/Spiritual assessment, patient floor     Spiritual beliefs: (Please include comment if needed)     [x] Identifies with a isabelle tradition: Taoism        [] Supported by a isabelle community:            [] Claims no spiritual orientation:           [] Seeking spiritual identity:                [] Adheres to an individual form of spirituality:           [] Not able to assess:                           Identified resources for coping:      [] Prayer                               [] Music                  [] Guided Imagery     [x] Family/friends                 [] Pet visits     [] Devotional reading                         [] Unknown     [] Other:                                               Interventions offered during this visit: (See comments for more details)    Patient Interventions: Affirmation of emotions/emotional suffering, Affirmation of isabelle, Coping skills reviewed/reinforced, Iconic (affirming the presence of God/Higher Power)     Family/Friend(s):  Affirmation of emotions/emotional suffering, Affirmation of isabelle, Coping skills reviewed/reinforced, Iconic (affirming the presence of God/Higher Power)(Wife (Chantal))     Plan of Care:     [] Support spiritual and/or cultural needs    [] Support AMD and/or advance care planning process      [] Support grieving process   [] Coordinate Rites and/or Rituals    [] Coordination with community clergy   [] No spiritual needs identified at this time   [] Detailed Plan of Care below (See Comments)  [] Make referral to Music Therapy  [] Make referral to Pet Therapy [] Make referral to Addiction services  [] Make referral to [facility]  [] Make referral to Spiritual Care Partner  [] No future visits requested        [x] Follow up visits as needed     Comments:  visit for initial spiritual assessment. Patient reclining in bed watching Sabianism programming on television. Wife is also at bedside. Good eye contact, smiling, good natured. Says he is feeling better. Provided spiritual presence and listening as he spoke of his current thoughts, feelings, and concerns. Described vibrant isabelle and good family support. Hope is to be able to return home within the next couple of days. Appeared comforted and encouraged as a result of this visit and expressed gratitude for this visit. Visited by Rev. Giacomo Hernandez, [address] paging service: [phone]) standing/actual

## 2022-03-18 PROBLEM — I10 HTN (HYPERTENSION): Status: ACTIVE | Noted: 2017-03-04

## 2022-03-18 PROBLEM — I25.10 CAD (CORONARY ARTERY DISEASE): Status: ACTIVE | Noted: 2017-03-04

## 2022-03-19 PROBLEM — I21.4 NSTEMI (NON-ST ELEVATED MYOCARDIAL INFARCTION) (HCC): Status: ACTIVE | Noted: 2017-03-04

## 2022-03-19 PROBLEM — I48.91 ATRIAL FIBRILLATION (HCC): Status: ACTIVE | Noted: 2017-03-04

## 2022-03-19 PROBLEM — Z98.890 S/P MITRAL VALVE REPAIR: Status: ACTIVE | Noted: 2017-03-06

## 2022-03-20 PROBLEM — I47.20 VENTRICULAR TACHYCARDIA (HCC): Status: ACTIVE | Noted: 2019-03-09

## 2022-04-20 ENCOUNTER — TELEPHONE (OUTPATIENT)
Dept: NEUROLOGY | Age: 77
End: 2022-04-20

## 2022-04-21 ENCOUNTER — OFFICE VISIT (OUTPATIENT)
Dept: NEUROLOGY | Age: 77
End: 2022-04-21
Payer: MEDICARE

## 2022-04-21 VITALS
HEART RATE: 46 BPM | HEIGHT: 72 IN | WEIGHT: 185 LBS | SYSTOLIC BLOOD PRESSURE: 118 MMHG | DIASTOLIC BLOOD PRESSURE: 70 MMHG | BODY MASS INDEX: 25.06 KG/M2 | RESPIRATION RATE: 16 BRPM | OXYGEN SATURATION: 99 %

## 2022-04-21 DIAGNOSIS — H53.461 RIGHT HOMONYMOUS INFERIOR QUADRANTANOPIA: ICD-10-CM

## 2022-04-21 DIAGNOSIS — I67.1 ANTERIOR COMMUNICATING ARTERY ANEURYSM: ICD-10-CM

## 2022-04-21 DIAGNOSIS — I63.9 OCCIPITAL STROKE (HCC): Primary | ICD-10-CM

## 2022-04-21 DIAGNOSIS — E85.4 CEREBRAL AMYLOID ANGIOPATHY (HCC): ICD-10-CM

## 2022-04-21 DIAGNOSIS — I68.0 CEREBRAL AMYLOID ANGIOPATHY (HCC): ICD-10-CM

## 2022-04-21 DIAGNOSIS — I48.91 ATRIAL FIBRILLATION, UNSPECIFIED TYPE (HCC): ICD-10-CM

## 2022-04-21 DIAGNOSIS — D32.0 CEREBRAL MENINGIOMA (HCC): ICD-10-CM

## 2022-04-21 PROCEDURE — 99205 OFFICE O/P NEW HI 60 MIN: CPT | Performed by: PSYCHIATRY & NEUROLOGY

## 2022-04-21 RX ORDER — APIXABAN 5 MG/1
5 TABLET, FILM COATED ORAL 2 TIMES DAILY
COMMUNITY
Start: 2022-03-10

## 2022-04-21 RX ORDER — AMIODARONE HYDROCHLORIDE 200 MG/1
100 TABLET ORAL DAILY
COMMUNITY
Start: 2022-03-24

## 2022-04-21 RX ORDER — DILTIAZEM HYDROCHLORIDE 240 MG/1
CAPSULE, COATED, EXTENDED RELEASE ORAL DAILY
COMMUNITY
Start: 2022-03-21

## 2022-04-21 RX ORDER — ROSUVASTATIN CALCIUM 10 MG/1
10 TABLET, COATED ORAL
COMMUNITY

## 2022-04-21 NOTE — PROGRESS NOTES
Chief Complaint   Patient presents with    New Patient     stroke in 3/2022, he was not seen in the hospital, Dr Chantal Fountain ordered an MRI, c/o right eye peripheral vision issue, patient has a MRI impression     Visit Vitals  /70   Pulse (!) 46   Resp 16   Ht 6' (1.829 m)   Wt 83.9 kg (185 lb)   SpO2 99%   BMI 25.09 kg/m²

## 2022-04-21 NOTE — LETTER
4/21/2022    Patient: Catrina Pelayo Sr.   YOB: 1945   Date of Visit: 4/21/2022     Sancho Doyle MD  [address] [place] [address]  Via Fax: [phone]    Dear Sancho Doyle MD,      Thank you for referring Mr. Emory Marie to [facility] for evaluation. My notes for this consultation are attached. If you have questions, please do not hesitate to call me. I look forward to following your patient along with you.       Sincerely,    Dave Quigley MD

## 2022-04-21 NOTE — PROGRESS NOTES
Farhan Tavares Sr. (1945) is a 77 y.o. male, new patient, here for evaluation of the following     Chief complaint(s):   Chief Complaint   Patient presents with   [address] [facility] Patient     stroke in 3/2022, he was not seen in the hospital, Dr Roxy Perea ordered an MRI, c/o right eye peripheral vision issue, patient has a MRI impression       HPI: 77 y.o. male  A Fib, CAD, Systolic CHF, GERD, HTN, IBS, \"moderately dilated ascending aorta,\" s/p mitral valve repair, hx sciatica. Referred by PCP/Dr. Roxy Perea for stroke evaluation. Reviewed note dated 2-. Patient describes that on 2-25-22, he was at [facility] in the morning doing something and suddenly felt dizzy/ light-headed, which faded quickly. Noticed over next 2 days his vision on right side didn't seem right. No other neurologic symptoms. Saw PCP on 2- with complaints of vision change. Stroke was suspected so Brain MRI was ordered and done same day at [facility].  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 Patient says he followed up with his Cardiologist/ Dr Karen Harmon, who added Eliquis 5 mg BID. Had Carotid Doppler done recently and says no significant carotid stenosis. Has plans to have TTE next week and also having an aortic scan. Says he had detailed eye exam and the eye doctor found that he had a mild visual defect in the far right lateral/ inferior quadrant.      Review of Systems: Anxiety, history of CAD, mitral valve repair, history of basal cell skin cancer, high blood pressure, itching, occasional shortness of breath, history of recent stroke, vision problems (recent stroke)    ==================================================    No Known Allergies    Current Outpatient Medications   Medication Sig Dispense Refill    Eliquis 5 mg tablet Take 5 mg by mouth two (2) times a day.  dilTIAZem ER (CARDIZEM CD) 240 mg capsule Take  by mouth daily.  rosuvastatin (CRESTOR) 10 mg tablet Take 10 mg by mouth nightly.  metoprolol succinate (TOPROL-XL) 25 mg XL tablet Take 1 Tab by mouth daily. (Patient taking differently: Take 50 mg by mouth daily.) 30 Tab 0    pantoprazole (PROTONIX) 40 mg tablet Take 40 mg by mouth daily as needed.  ALPRAZolam (XANAX) 1 mg tablet Take 0.5 mg by mouth nightly as needed for Anxiety.  dicyclomine (BENTYL) 20 mg tablet Take 20 mg by mouth two (2) times daily as needed.  amiodarone (CORDARONE) 200 mg tablet Take 100 mg by mouth daily. Past Surgical History:   Procedure Laterality Date    HX COLONOSCOPY  2018    HX ENDOSCOPY      DE MITRALPLASTY W PROSTHETIC RING  2012    repair of mitral valve       family history includes Hypertension in his father; Stroke in his mother. reports that he has never smoked. He has never used smokeless tobacco. He reports current alcohol use of about 10.0 standard drinks of alcohol per week. He reports that he does not use drugs. PHYSICAL EXAM    Vitals:    04/21/22 1300   BP: 118/70   Pulse: (!) 46   Resp: 16   Height: 6' (1.829 m)   Weight: 83.9 kg (185 lb)   SpO2: 99%       General:    Head: atraumatic. Eyes: Conjunctivae clear. Vascular/ Carotid Arteries: not examined. Extremities: no edema. Skin: no rashes. Neurologic Exam:  Speech/ Language: No dysarthria, no aphasia. Alertness: oriented x3.  CNs: Smell: not tested. Visual Fields (II): Left visual field is normal. There is a very slight visual defect in the far right lateral inferior visual field. Pupils (II): equal, round, reactive to light. Funduscopic: not examined. Extraocular movements (III, IV, VI): conjugate in all directions, no MARC. Ptosis (III, VII): none. Facial Sensation (V): intact to LT on both sides. Facial Movements (VII): symmetric at rest and on activation. Hearing (VIII): normal.  Soft palate elevation (IX): symmetric, no droop. Shoulder shrug (XI): symmetric, strong. Tongue protrusion (XII): midline    Motor:  5/5 in all exts. Sensory: intact LT in all exts. Cerebellar: no resting or postural tremors; mild bilateral intention tremor . Deep Tendon Reflexes: 1+ biceps, 2+ patellars, 1+ achilles (symmetric). Plantar response: not tested. Gait: stands independently. Ambulates with mild unsteady gait. Has difficulty performing tandem gait (reports this is a chronic problem). Romberg: no tested    ==================================================    ASSESSMENT/ PLAN:       ICD-10-CM ICD-9-CM    1. Occipital stroke ([facility])  I63.9 434.91    2. Right homonymous inferior quadrantanopia  H53.461 368.46    3. Atrial fibrillation, unspecified type ([facility]  I48.91 427.31    4. Anterior communicating artery aneurysm  I67.1 437.3    5. Cerebral meningioma ([facility])  D32.0 225.2 REFERRAL TO NEUROSURGERY   6. Cerebral amyloid angiopathy ([facility])  E85.4 277.39     I68.0 437.9       1) Left occipital-temporal stroke (onset around 2-25 or 2-26-22): very mild, far, right, lateral-inferior visual defect. No other neurologic symptoms or deficits. 2) Hx of A Fib (historically on rate/ rhythm control and was on Xarelto at some time in past; started on Eliquis after recent stroke). 3) Hx of multi-focal, chronic, cerebral micro-hemorrhages (likely due to Cerebral Amyloid Angiopathy, given patient's age)    D/w patient that normally with stroke in setting of A Fib, Neurology recommends and agrees with patient being started on anticoagulation to reduce chance of future stroke.   However, because he has evidence of chronic cerebral micro-hemorrhages, I d/w him that I recommend against the use of Anticoagulation as it may results in greater, potentially symptomatic intracranial hemorrhage. I d/w him that I am going to recommend his Cardiologist consider whether Watchman Device to reduce chance of stroke from A Fib, would be appropriate in lieu of Anticoagulation. Recommend increasing Statin to 40 mg QHS  D/w patient goal LDL in setting of history of stroke is under 70  D/w patient that DMV regulations state he/ patients cannot drive x 6 months after stroke. 4) Cerebral meningioma  D/w patient that I am going to refer him to Dr Yandel Francis at [facility] to discuss treatment options for this. 5) Incidental Anterior Communicating Artery Aneurysm:   Discussed with patient he should intracranial arterial imaging CTA Head as suggested in the Brain MRI report. Additionally, Radiology recommended that patient be referred to for Neuro-Interventional Radiology Consultation at [facility] ([phone]). D/w patient I am going to defer to Dr Whitehead/ PCP to initiate this referral and order the CTA Head. 6)Follow up on/ after 8- (which would be 6 months after stroke). An electronic signature was used to authenticate this note.   -- Rama Currie MD

## 2022-04-25 ENCOUNTER — PATIENT MESSAGE (OUTPATIENT)
Dept: NEUROLOGY | Age: 77
End: 2022-04-25

## 2022-08-29 ENCOUNTER — OFFICE VISIT (OUTPATIENT)
Dept: NEUROLOGY | Age: 77
End: 2022-08-29
Payer: MEDICARE

## 2022-08-29 VITALS
BODY MASS INDEX: 23.7 KG/M2 | WEIGHT: 175 LBS | DIASTOLIC BLOOD PRESSURE: 86 MMHG | RESPIRATION RATE: 16 BRPM | HEART RATE: 52 BPM | OXYGEN SATURATION: 97 % | SYSTOLIC BLOOD PRESSURE: 144 MMHG | HEIGHT: 72 IN

## 2022-08-29 DIAGNOSIS — E85.4 CEREBRAL AMYLOID ANGIOPATHY (HCC): ICD-10-CM

## 2022-08-29 DIAGNOSIS — G25.3 INSOMNIA DUE TO NOCTURNAL MYOCLONUS: ICD-10-CM

## 2022-08-29 DIAGNOSIS — G47.01 INSOMNIA DUE TO NOCTURNAL MYOCLONUS: ICD-10-CM

## 2022-08-29 DIAGNOSIS — I68.0 CEREBRAL AMYLOID ANGIOPATHY (HCC): ICD-10-CM

## 2022-08-29 DIAGNOSIS — D32.0 CEREBRAL MENINGIOMA (HCC): ICD-10-CM

## 2022-08-29 DIAGNOSIS — I67.1 ANTERIOR COMMUNICATING ARTERY ANEURYSM: ICD-10-CM

## 2022-08-29 DIAGNOSIS — I63.9 OCCIPITAL STROKE (HCC): Primary | ICD-10-CM

## 2022-08-29 DIAGNOSIS — I48.91 ATRIAL FIBRILLATION, UNSPECIFIED TYPE (HCC): ICD-10-CM

## 2022-08-29 PROCEDURE — G8427 DOCREV CUR MEDS BY ELIG CLIN: HCPCS | Performed by: PSYCHIATRY & NEUROLOGY

## 2022-08-29 PROCEDURE — G8510 SCR DEP NEG, NO PLAN REQD: HCPCS | Performed by: PSYCHIATRY & NEUROLOGY

## 2022-08-29 PROCEDURE — 99215 OFFICE O/P EST HI 40 MIN: CPT | Performed by: PSYCHIATRY & NEUROLOGY

## 2022-08-29 PROCEDURE — G8420 CALC BMI NORM PARAMETERS: HCPCS | Performed by: PSYCHIATRY & NEUROLOGY

## 2022-08-29 PROCEDURE — G8754 DIAS BP LESS 90: HCPCS | Performed by: PSYCHIATRY & NEUROLOGY

## 2022-08-29 PROCEDURE — G8753 SYS BP > OR = 140: HCPCS | Performed by: PSYCHIATRY & NEUROLOGY

## 2022-08-29 PROCEDURE — G8536 NO DOC ELDER MAL SCRN: HCPCS | Performed by: PSYCHIATRY & NEUROLOGY

## 2022-08-29 PROCEDURE — 3017F COLORECTAL CA SCREEN DOC REV: CPT | Performed by: PSYCHIATRY & NEUROLOGY

## 2022-08-29 PROCEDURE — 1123F ACP DISCUSS/DSCN MKR DOCD: CPT | Performed by: PSYCHIATRY & NEUROLOGY

## 2022-08-29 PROCEDURE — 1101F PT FALLS ASSESS-DOCD LE1/YR: CPT | Performed by: PSYCHIATRY & NEUROLOGY

## 2022-08-29 RX ORDER — METOPROLOL SUCCINATE 50 MG/1
50 TABLET, EXTENDED RELEASE ORAL DAILY
COMMUNITY
Start: 2022-08-20

## 2022-08-29 RX ORDER — LISINOPRIL 20 MG/1
20 TABLET ORAL DAILY
COMMUNITY

## 2022-08-29 NOTE — PROGRESS NOTES
Chief Complaint   Patient presents with    Follow-up     6 month follow up - still having peripheral vision issue right eye. Patient states he saw Dr. Donna Henley and was told he had a small tumor and it was benign and to follow up in 2 years.       Visit Vitals  BP (!) 144/86   Pulse (!) 52   Resp 16   Ht 6' (1.829 m)   Wt 79.4 kg (175 lb)   SpO2 97%   BMI 23.73 kg/m²

## 2022-08-29 NOTE — LETTER
8/29/2022    Patient: Roosevelt Torres.   YOB: 1945   Date of Visit: 8/29/2022     Taisha Mistry MD  [address] [place] [id number]  Via Fax: [phone]    Dear Taisha Mistry MD,      Thank you for referring Mr. Maxine Acevedo to [facility] for evaluation. My notes for this consultation are attached. If you have questions, please do not hesitate to call me. I look forward to following your patient along with you.       Sincerely,    Sameer Underwood MD

## 2022-08-29 NOTE — PROGRESS NOTES
Luda Fisher . (1945) is a 77 y.o. male, established patient, here for evaluation of the following     Chief complaint(s):   Chief Complaint   Patient presents with    Follow-up     6 month follow up - still having peripheral vision issue right eye. SUBJECTIVE/ OBJECTIVE:    HPI: 77 y.o. male      6 month follow up after stroke (2-)    Pt did see Dr Leny Dennis Neurosurgery regarding meningioma. Pt relays that Dr Mirela Perez felt the meningioma as small, in a non-critical area, and no indication to do any treatment at this point; follow up in 2 yrs with him. Pt reports he had Cerebral Angiogram/ Dr Kenrick Russell regarding the cerebral aneurysm. Pt says that he was told that the Cerebral Angio showed that the aneurysm was smaller than seen on CTA Head, no intervention was performed, and plan is for pt to have repeat CTA Head in 1 year and follow up with Dr Janet Mcgovern. Hx of A Fib and Hx of Cerebral Microhemorrhages: pt says he followed up with Cardiologist about this issue and my suggestion of Watchman device in lieu of anticoagulation. Pt says Cardiologist preferred he stay on anticoagulation, unless Neurologist felt he should not be on it. We reviewed again that due to the chronic cerebral microhemorrhages, he is at high risks of symptomatic intracranial hemorrhage from the anticoagulation (Eliquis) and my recommendation is for him to discuss with his Cardiologist alternative treatment options for atrial fibrillation (ie not anticoagulation)    Says he followed up with Ophthalmologist and says testing was repeated and Ophtho did not find any visual field defect. Pt denies any new TIA or stroke symptoms.       Other Symptoms (New): Pt reports that since last visit, he has sporadic episodes where he'll be lying in bed at night, going to sleep, and about 30 minutes after laying down or being asleep, his left leg will start shaking/ jerking, and this will last 30-45 minutes, preventing him from going to sleep, and he'll feel tired the next day. There is no associated alteration of alertness. Says it happens maybe once every 5-6 days. Does not have any personal hx of seizure.       ========================================    Brief Hx:      Impression/ plan from initial visit 4-:    1) Left occipital-temporal stroke (onset around 2-25 or 2-26-22): very mild, far, right, lateral-inferior visual defect. No other neurologic symptoms or deficits. 2) Hx of A Fib (historically on rate/ rhythm control and was on Xarelto at some time in past; started on Eliquis after recent stroke). 3) Hx of multi-focal, chronic, cerebral micro-hemorrhages (likely due to Cerebral Amyloid Angiopathy, given patient's age)     D/w patient that normally with stroke in setting of A Fib, Neurology recommends and agrees with patient being started on anticoagulation to reduce chance of future stroke. However, because he has evidence of chronic cerebral micro-hemorrhages, I d/w him that I recommend against the use of Anticoagulation as it may results in greater, potentially symptomatic intracranial hemorrhage. I d/w him that I am going to recommend his Cardiologist consider whether Watchman Device to reduce chance of stroke from A Fib, would be appropriate in lieu of Anticoagulation. Recommend increasing Statin to 40 mg QHS  D/w patient goal LDL in setting of history of stroke is under 70  D/w patient that DMV regulations state he/ patients cannot drive x 6 months after stroke. 4) Cerebral meningioma  D/w patient that I am going to refer him to Dr Sherry Baptiste at [facility] to discuss treatment options for this. 5) Incidental Anterior Communicating Artery Aneurysm:   Discussed with patient he should intracranial arterial imaging CTA Head as suggested in the Brain MRI report.   Additionally, Radiology recommended that patient be referred to for Neuro-Interventional Radiology Consultation at [facility] ([phone]). D/w patient I am going to defer to Dr Whitehead/ PCP to initiate this referral and order the CTA Head. 6)Follow up on/ after 8- (which would be 6 months after stroke). No Known Allergies      Current Outpatient Medications:     metoprolol succinate (TOPROL-XL) 50 mg XL tablet, Take 50 mg by mouth daily. , Disp: , Rfl:     lisinopriL (PRINIVIL, ZESTRIL) 20 mg tablet, Take 20 mg by mouth daily. , Disp: , Rfl:     Eliquis 5 mg tablet, Take 5 mg by mouth two (2) times a day., Disp: , Rfl:     dilTIAZem ER (CARDIZEM CD) 240 mg capsule, Take  by mouth daily. , Disp: , Rfl:     amiodarone (CORDARONE) 200 mg tablet, Take 100 mg by mouth daily. , Disp: , Rfl:     rosuvastatin (CRESTOR) 10 mg tablet, Take 10 mg by mouth nightly., Disp: , Rfl:     pantoprazole (PROTONIX) 40 mg tablet, Take 40 mg by mouth daily as needed. , Disp: , Rfl:     ALPRAZolam (XANAX) 1 mg tablet, Take 0.5 mg by mouth nightly as needed for Anxiety. , Disp: , Rfl:     dicyclomine (BENTYL) 20 mg tablet, Take 20 mg by mouth two (2) times daily as needed. , Disp: , Rfl:     metoprolol succinate (TOPROL-XL) 25 mg XL tablet, Take 1 Tab by mouth daily. (Patient not taking: Reported on 8/29/2022), Disp: 30 Tab, Rfl: 0     has a past medical history of A-fib ([address] CAD (coronary artery disease), Chronic systolic heart failure ([address] GERD (gastroesophageal reflux disease), Hypertension, IBS (irritable bowel syndrome), Ill-defined condition, S/P mitral valve repair, and Sciatica (2009). has a past surgical history that includes pr mitralplasty w prosthetic ring (2012); hx colonoscopy (2018); and hx endoscopy.       Physical Exam:    Vitals:    08/29/22 1002   BP: (!) 144/86   Pulse: (!) 52   Resp: 16   Height: 6' (1.829 m)   Weight: 79.4 kg (175 lb)   SpO2: 97%     Awake alert calm resting conversant  EOMI, visual fields are normal bilateral  Hearing and speech is normal  Moves all extremities 5 out of 5  Intact light touch in all extremities  Stands and ambulates with mild unsteadiness (unchanged compared to last visit)      ========================================    ASSESSMENT/ PLAN:       ICD-10-CM ICD-9-CM    1. Occipital stroke ([facility])  I63.9 434.91       2. Cerebral meningioma ([facility])  D32.0 225.2       3. Cerebral amyloid angiopathy ([facility])  E85.4 277.39     I68.0 437.9       4. Atrial fibrillation, unspecified type ([facility]  I48.91 427.31       5. 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 Patient reports that ophthalmologist has told him that his formal visual field testing was also normal.  We will sign patient's DMV paperwork and fax it in. Hx Cerebral Meningioma: per Neurosurgery (Dr Lex Hadley)    Hx of Intracranial Aneurysm: per Neuro-Interventional Radiology/ Surgery (Dr Sheela Chahal)    Hx of Chronic Cerebral Microhemorrhages (suspect cerebral amyloid angiopathy): see discussion in HPI    Hx of Atrial Fibrillation: per Cardiologist (Dr Norman Bolanos)      Nocturnal Myoclonus: discussed with patient to check EEG to ensure that these episodes are not due to focal motor seizure (ie, without alteration of awareness). He considered but declines for now. D/w patient that as he does not have any active neurologic issues, no routine follow up visits are needed at this time. He is welcome to schedule a follow-up visit if he has any new neurologic symptoms he would like to discuss. Reminded him him that if he has any abrupt onset TIA or strokelike symptoms he should call 911. Sent a copy of this note to patient's cardiologist at his request.        An electronic signature was used to authenticate this note.   -- Zeynep Hercules MD

## 2023-10-13 ENCOUNTER — HOSPITAL ENCOUNTER (EMERGENCY)
Facility: HOSPITAL | Age: 78
Discharge: HOME | End: 2023-10-13
Attending: STUDENT IN AN ORGANIZED HEALTH CARE EDUCATION/TRAINING PROGRAM
Payer: MEDICARE

## 2023-10-13 VITALS
TEMPERATURE: 97.9 F | HEART RATE: 69 BPM | DIASTOLIC BLOOD PRESSURE: 84 MMHG | SYSTOLIC BLOOD PRESSURE: 125 MMHG | WEIGHT: 175 LBS | HEIGHT: 72 IN | BODY MASS INDEX: 23.7 KG/M2 | RESPIRATION RATE: 12 BRPM | OXYGEN SATURATION: 98 %

## 2023-10-13 DIAGNOSIS — I49.9 IRREGULAR HEART RATE: Primary | ICD-10-CM

## 2023-10-13 LAB
ALBUMIN SERPL-MCNC: 4.5 G/DL (ref 3.5–5)
ALBUMIN/GLOB SERPL: 1.3 (ref 1.1–2.2)
ALP SERPL-CCNC: 79 U/L (ref 45–117)
ALT SERPL-CCNC: 34 U/L (ref 12–78)
ANION GAP SERPL CALC-SCNC: 11 MMOL/L (ref 5–15)
AST SERPL-CCNC: 30 U/L (ref 15–37)
BASOPHILS # BLD: 0.1 K/UL (ref 0–0.1)
BASOPHILS NFR BLD: 1 % (ref 0–1)
BILIRUB SERPL-MCNC: 0.6 MG/DL (ref 0.2–1)
BUN SERPL-MCNC: 19 MG/DL (ref 6–20)
BUN/CREAT SERPL: 15 (ref 12–20)
CALCIUM SERPL-MCNC: 9.8 MG/DL (ref 8.5–10.1)
CHLORIDE SERPL-SCNC: 101 MMOL/L (ref 97–108)
CO2 SERPL-SCNC: 25 MMOL/L (ref 21–32)
COMMENT:: NORMAL
CREAT SERPL-MCNC: 1.28 MG/DL (ref 0.7–1.3)
DIFFERENTIAL METHOD BLD: NORMAL
EOSINOPHIL # BLD: 0.1 K/UL (ref 0–0.4)
EOSINOPHIL NFR BLD: 1 % (ref 0–7)
ERYTHROCYTE [DISTWIDTH] IN BLOOD BY AUTOMATED COUNT: 13.2 % (ref 11.5–14.5)
GLOBULIN SER CALC-MCNC: 3.5 G/DL (ref 2–4)
GLUCOSE SERPL-MCNC: 98 MG/DL (ref 65–100)
HCT VFR BLD AUTO: 43.7 % (ref 36.6–50.3)
HGB BLD-MCNC: 14.9 G/DL (ref 12.1–17)
IMM GRANULOCYTES # BLD AUTO: 0 K/UL (ref 0–0.04)
IMM GRANULOCYTES NFR BLD AUTO: 0 % (ref 0–0.5)
LYMPHOCYTES # BLD: 2.7 K/UL (ref 0.8–3.5)
LYMPHOCYTES NFR BLD: 31 % (ref 12–49)
MAGNESIUM SERPL-MCNC: 2.2 MG/DL (ref 1.6–2.4)
MCH RBC QN AUTO: 32.7 PG (ref 26–34)
MCHC RBC AUTO-ENTMCNC: 34.1 G/DL (ref 30–36.5)
MCV RBC AUTO: 96 FL (ref 80–99)
MONOCYTES # BLD: 0.7 K/UL (ref 0–1)
MONOCYTES NFR BLD: 8 % (ref 5–13)
NEUTS SEG # BLD: 5 K/UL (ref 1.8–8)
NEUTS SEG NFR BLD: 59 % (ref 32–75)
NRBC # BLD: 0 K/UL (ref 0–0.01)
NRBC BLD-RTO: 0 PER 100 WBC
PLATELET # BLD AUTO: 185 K/UL (ref 150–400)
PMV BLD AUTO: 9.8 FL (ref 8.9–12.9)
POTASSIUM SERPL-SCNC: 3.7 MMOL/L (ref 3.5–5.1)
PROT SERPL-MCNC: 8 G/DL (ref 6.4–8.2)
RBC # BLD AUTO: 4.55 M/UL (ref 4.1–5.7)
SODIUM SERPL-SCNC: 137 MMOL/L (ref 136–145)
SPECIMEN HOLD: NORMAL
TROPONIN I SERPL HS-MCNC: 23 NG/L (ref 0–76)
TROPONIN I SERPL HS-MCNC: 25 NG/L (ref 0–76)
WBC # BLD AUTO: 8.6 K/UL (ref 4.1–11.1)

## 2023-10-13 PROCEDURE — 93005 ELECTROCARDIOGRAM TRACING: CPT | Performed by: STUDENT IN AN ORGANIZED HEALTH CARE EDUCATION/TRAINING PROGRAM

## 2023-10-13 PROCEDURE — 36415 COLL VENOUS BLD VENIPUNCTURE: CPT

## 2023-10-13 PROCEDURE — 80053 COMPREHEN METABOLIC PANEL: CPT

## 2023-10-13 PROCEDURE — 99284 EMERGENCY DEPT VISIT MOD MDM: CPT

## 2023-10-13 PROCEDURE — 84484 ASSAY OF TROPONIN QUANT: CPT

## 2023-10-13 PROCEDURE — 83735 ASSAY OF MAGNESIUM: CPT

## 2023-10-13 PROCEDURE — 85025 COMPLETE CBC W/AUTO DIFF WBC: CPT

## 2023-10-13 NOTE — ED PROVIDER NOTES
was in the ED. Appears to be unchanged from previous. Patient comfortable with discharge at this time. Amount and/or Complexity of Data Reviewed  External Data Reviewed: labs, radiology and notes. Labs: ordered. Decision-making details documented in ED Course. ECG/medicine tests: ordered and independent interpretation performed. Decision-making details documented in ED Course. Procedures       DISPOSITION: Decision To Discharge 10/13/2023 10:36:19 PM    CLINICAL IMPRESSION:   1. Irregular heart rate         Further personalized recommendations for outpatient care as below. Key discharge instructions and summary of care provided in AVS: You presented to the ED with fast and slow heart rate on home heart rate monitoring. Here in the ED your heart has been stable in the 80s. Lab work without any significant abnormalities. Troponins x2 within normal limits. Importantly you have not had any shortness of breath or chest pain. I was able to contact a cardiologist that works with your cardiologist.  They recommended you discontinue your amiodarone but continue your diltiazem and follow-up with your EP physician, Dr. Kristopher Evans. If you have any chest pain or shortness of breath return to the ED for further evaluation. Case the patient has been re-evaluated and feeling better. Patient is stable for discharge. All available radiology and laboratory results have been reviewed with patient and/or available family. Patient and/or family verbally conveyed their understanding and agreement of the patient's signs, symptoms, diagnosis, treatment and prognosis and additionally agree to follow-up as recommended in the discharge instructions or to return to the Emergency Department should their condition change or worsen prior to their follow-up appointment. All questions have been answered and patient and/or available family express understanding.       Prescriptions provided to the patient:     New

## 2023-10-13 NOTE — ED TRIAGE NOTES
Pt reports irregular heartbeat x2 days when he was checking his pulse, denies any physical symptoms.

## 2023-10-14 LAB
EKG DIAGNOSIS: NORMAL
EKG Q-T INTERVAL: 450 MS
EKG QRS DURATION: 156 MS
EKG QTC CALCULATION (BAZETT): 531 MS
EKG R AXIS: -5 DEGREES
EKG T AXIS: 72 DEGREES
EKG VENTRICULAR RATE: 84 BPM

## 2023-10-14 PROCEDURE — 93010 ELECTROCARDIOGRAM REPORT: CPT | Performed by: INTERNAL MEDICINE

## 2023-10-14 NOTE — ED NOTES
Pt was discharged and given instructions by Dr Octaviano Shane. Pt verbalized good understanding of all discharge instructions and F/U care. All questions answered. Pt in stable condition on discharge.        Adams Yost RN  10/13/23 0201

## 2023-10-14 NOTE — ED NOTES
Patient independently ambulatory to restroom and returned to stretcher, Patient called and updated wife via phone.       Phuong Parr RN  10/13/23 2008

## 2023-10-14 NOTE — DISCHARGE INSTRUCTIONS
You presented to the ED with fast and slow heart rate on home heart rate monitoring. Here in the ED your heart has been stable in the 80s. Lab work without any significant abnormalities. Troponins x2 within normal limits. Importantly you have not had any shortness of breath or chest pain. I was able to contact a cardiologist that works with your cardiologist.  They recommended you discontinue your amiodarone but continue your diltiazem and follow-up with your Delia Marie, Dr. Tara Haynes. If you have any chest pain or shortness of breath return to the ED for further evaluation.

## 2024-02-26 ENCOUNTER — HOSPITAL ENCOUNTER (INPATIENT)
Facility: HOSPITAL | Age: 79
LOS: 2 days | Discharge: HOME | DRG: 309 | End: 2024-02-28
Attending: STUDENT IN AN ORGANIZED HEALTH CARE EDUCATION/TRAINING PROGRAM | Admitting: HOSPITALIST
Payer: MEDICARE

## 2024-02-26 ENCOUNTER — APPOINTMENT (OUTPATIENT)
Facility: HOSPITAL | Age: 79
DRG: 309 | End: 2024-02-26
Payer: MEDICARE

## 2024-02-26 DIAGNOSIS — I50.23 ACUTE ON CHRONIC SYSTOLIC CONGESTIVE HEART FAILURE (HCC): ICD-10-CM

## 2024-02-26 DIAGNOSIS — I48.91 ATRIAL FIBRILLATION WITH RAPID VENTRICULAR RESPONSE (HCC): Primary | ICD-10-CM

## 2024-02-26 LAB
ALBUMIN SERPL-MCNC: 3.9 G/DL (ref 3.5–5)
ALBUMIN/GLOB SERPL: 1.2 (ref 1.1–2.2)
ALP SERPL-CCNC: 90 U/L (ref 45–117)
ALT SERPL-CCNC: 29 U/L (ref 12–78)
ANION GAP SERPL CALC-SCNC: 6 MMOL/L (ref 5–15)
AST SERPL-CCNC: 28 U/L (ref 15–37)
BASOPHILS # BLD: 0 K/UL (ref 0–0.1)
BASOPHILS NFR BLD: 1 % (ref 0–1)
BILIRUB SERPL-MCNC: 0.5 MG/DL (ref 0.2–1)
BUN SERPL-MCNC: 21 MG/DL (ref 6–20)
BUN/CREAT SERPL: 17 (ref 12–20)
CALCIUM SERPL-MCNC: 9.7 MG/DL (ref 8.5–10.1)
CHLORIDE SERPL-SCNC: 107 MMOL/L (ref 97–108)
CO2 SERPL-SCNC: 28 MMOL/L (ref 21–32)
CREAT SERPL-MCNC: 1.22 MG/DL (ref 0.7–1.3)
DIFFERENTIAL METHOD BLD: NORMAL
EOSINOPHIL # BLD: 0.1 K/UL (ref 0–0.4)
EOSINOPHIL NFR BLD: 1 % (ref 0–7)
ERYTHROCYTE [DISTWIDTH] IN BLOOD BY AUTOMATED COUNT: 13.4 % (ref 11.5–14.5)
GLOBULIN SER CALC-MCNC: 3.2 G/DL (ref 2–4)
GLUCOSE SERPL-MCNC: 132 MG/DL (ref 65–100)
HCT VFR BLD AUTO: 40.2 % (ref 36.6–50.3)
HGB BLD-MCNC: 13.7 G/DL (ref 12.1–17)
IMM GRANULOCYTES # BLD AUTO: 0 K/UL (ref 0–0.04)
IMM GRANULOCYTES NFR BLD AUTO: 0 % (ref 0–0.5)
LYMPHOCYTES # BLD: 2.2 K/UL (ref 0.8–3.5)
LYMPHOCYTES NFR BLD: 30 % (ref 12–49)
MAGNESIUM SERPL-MCNC: 2.1 MG/DL (ref 1.6–2.4)
MCH RBC QN AUTO: 32.5 PG (ref 26–34)
MCHC RBC AUTO-ENTMCNC: 34.1 G/DL (ref 30–36.5)
MCV RBC AUTO: 95.5 FL (ref 80–99)
MONOCYTES # BLD: 0.6 K/UL (ref 0–1)
MONOCYTES NFR BLD: 9 % (ref 5–13)
NEUTS SEG # BLD: 4.2 K/UL (ref 1.8–8)
NEUTS SEG NFR BLD: 59 % (ref 32–75)
NRBC # BLD: 0 K/UL (ref 0–0.01)
NRBC BLD-RTO: 0 PER 100 WBC
PLATELET # BLD AUTO: 154 K/UL (ref 150–400)
PMV BLD AUTO: 9.5 FL (ref 8.9–12.9)
POTASSIUM SERPL-SCNC: 4.5 MMOL/L (ref 3.5–5.1)
PROT SERPL-MCNC: 7.1 G/DL (ref 6.4–8.2)
RBC # BLD AUTO: 4.21 M/UL (ref 4.1–5.7)
SODIUM SERPL-SCNC: 141 MMOL/L (ref 136–145)
TROPONIN I SERPL HS-MCNC: 29 NG/L (ref 0–76)
TSH SERPL DL<=0.05 MIU/L-ACNC: 1.98 UIU/ML (ref 0.36–3.74)
WBC # BLD AUTO: 7.1 K/UL (ref 4.1–11.1)

## 2024-02-26 PROCEDURE — 6370000000 HC RX 637 (ALT 250 FOR IP): Performed by: HOSPITALIST

## 2024-02-26 PROCEDURE — 6360000002 HC RX W HCPCS: Performed by: STUDENT IN AN ORGANIZED HEALTH CARE EDUCATION/TRAINING PROGRAM

## 2024-02-26 PROCEDURE — 2580000003 HC RX 258: Performed by: STUDENT IN AN ORGANIZED HEALTH CARE EDUCATION/TRAINING PROGRAM

## 2024-02-26 PROCEDURE — 84484 ASSAY OF TROPONIN QUANT: CPT

## 2024-02-26 PROCEDURE — 2580000003 HC RX 258: Performed by: HOSPITALIST

## 2024-02-26 PROCEDURE — 99285 EMERGENCY DEPT VISIT HI MDM: CPT

## 2024-02-26 PROCEDURE — 93005 ELECTROCARDIOGRAM TRACING: CPT | Performed by: STUDENT IN AN ORGANIZED HEALTH CARE EDUCATION/TRAINING PROGRAM

## 2024-02-26 PROCEDURE — 85025 COMPLETE CBC W/AUTO DIFF WBC: CPT

## 2024-02-26 PROCEDURE — 36415 COLL VENOUS BLD VENIPUNCTURE: CPT

## 2024-02-26 PROCEDURE — 84443 ASSAY THYROID STIM HORMONE: CPT

## 2024-02-26 PROCEDURE — 80053 COMPREHEN METABOLIC PANEL: CPT

## 2024-02-26 PROCEDURE — 93005 ELECTROCARDIOGRAM TRACING: CPT | Performed by: FAMILY MEDICINE

## 2024-02-26 PROCEDURE — 71045 X-RAY EXAM CHEST 1 VIEW: CPT

## 2024-02-26 PROCEDURE — 2060000000 HC ICU INTERMEDIATE R&B

## 2024-02-26 PROCEDURE — 83735 ASSAY OF MAGNESIUM: CPT

## 2024-02-26 PROCEDURE — 96374 THER/PROPH/DIAG INJ IV PUSH: CPT

## 2024-02-26 RX ORDER — POLYETHYLENE GLYCOL 3350 17 G/17G
17 POWDER, FOR SOLUTION ORAL DAILY PRN
Status: DISCONTINUED | OUTPATIENT
Start: 2024-02-26 | End: 2024-02-28 | Stop reason: HOSPADM

## 2024-02-26 RX ORDER — ONDANSETRON 2 MG/ML
4 INJECTION INTRAMUSCULAR; INTRAVENOUS EVERY 6 HOURS PRN
Status: DISCONTINUED | OUTPATIENT
Start: 2024-02-26 | End: 2024-02-28 | Stop reason: HOSPADM

## 2024-02-26 RX ORDER — MAGNESIUM SULFATE IN WATER 40 MG/ML
2000 INJECTION, SOLUTION INTRAVENOUS PRN
Status: DISCONTINUED | OUTPATIENT
Start: 2024-02-26 | End: 2024-02-28 | Stop reason: HOSPADM

## 2024-02-26 RX ORDER — ALPRAZOLAM 0.25 MG/1
0.25 TABLET ORAL 2 TIMES DAILY PRN
Status: DISCONTINUED | OUTPATIENT
Start: 2024-02-26 | End: 2024-02-26

## 2024-02-26 RX ORDER — ACETAMINOPHEN 650 MG/1
650 SUPPOSITORY RECTAL EVERY 6 HOURS PRN
Status: DISCONTINUED | OUTPATIENT
Start: 2024-02-26 | End: 2024-02-28 | Stop reason: HOSPADM

## 2024-02-26 RX ORDER — ROSUVASTATIN CALCIUM 10 MG/1
10 TABLET, COATED ORAL DAILY
Status: DISCONTINUED | OUTPATIENT
Start: 2024-02-27 | End: 2024-02-28 | Stop reason: HOSPADM

## 2024-02-26 RX ORDER — SODIUM CHLORIDE 0.9 % (FLUSH) 0.9 %
5-40 SYRINGE (ML) INJECTION PRN
Status: DISCONTINUED | OUTPATIENT
Start: 2024-02-26 | End: 2024-02-28 | Stop reason: HOSPADM

## 2024-02-26 RX ORDER — SODIUM CHLORIDE 0.9 % (FLUSH) 0.9 %
5-40 SYRINGE (ML) INJECTION EVERY 12 HOURS SCHEDULED
Status: DISCONTINUED | OUTPATIENT
Start: 2024-02-26 | End: 2024-02-28 | Stop reason: HOSPADM

## 2024-02-26 RX ORDER — POTASSIUM CHLORIDE 750 MG/1
40 TABLET, FILM COATED, EXTENDED RELEASE ORAL PRN
Status: DISCONTINUED | OUTPATIENT
Start: 2024-02-26 | End: 2024-02-28 | Stop reason: HOSPADM

## 2024-02-26 RX ORDER — ONDANSETRON 4 MG/1
4 TABLET, ORALLY DISINTEGRATING ORAL EVERY 8 HOURS PRN
Status: DISCONTINUED | OUTPATIENT
Start: 2024-02-26 | End: 2024-02-28 | Stop reason: HOSPADM

## 2024-02-26 RX ORDER — ACETAMINOPHEN 325 MG/1
650 TABLET ORAL EVERY 6 HOURS PRN
Status: DISCONTINUED | OUTPATIENT
Start: 2024-02-26 | End: 2024-02-28 | Stop reason: HOSPADM

## 2024-02-26 RX ORDER — METOPROLOL SUCCINATE 50 MG/1
50 TABLET, EXTENDED RELEASE ORAL DAILY
Status: DISCONTINUED | OUTPATIENT
Start: 2024-02-27 | End: 2024-02-28 | Stop reason: HOSPADM

## 2024-02-26 RX ORDER — ALPRAZOLAM 0.5 MG/1
0.5 TABLET ORAL 2 TIMES DAILY PRN
Status: DISCONTINUED | OUTPATIENT
Start: 2024-02-26 | End: 2024-02-28 | Stop reason: HOSPADM

## 2024-02-26 RX ORDER — SODIUM CHLORIDE 9 MG/ML
INJECTION, SOLUTION INTRAVENOUS PRN
Status: DISCONTINUED | OUTPATIENT
Start: 2024-02-26 | End: 2024-02-28 | Stop reason: HOSPADM

## 2024-02-26 RX ORDER — ROSUVASTATIN CALCIUM 10 MG/1
10 TABLET, COATED ORAL NIGHTLY
Status: DISCONTINUED | OUTPATIENT
Start: 2024-02-26 | End: 2024-02-26

## 2024-02-26 RX ORDER — ALPRAZOLAM 0.5 MG/1
0.5 TABLET ORAL 3 TIMES DAILY PRN
Status: DISCONTINUED | OUTPATIENT
Start: 2024-02-26 | End: 2024-02-26

## 2024-02-26 RX ORDER — PANTOPRAZOLE SODIUM 40 MG/1
40 TABLET, DELAYED RELEASE ORAL DAILY PRN
Status: DISCONTINUED | OUTPATIENT
Start: 2024-02-26 | End: 2024-02-28 | Stop reason: HOSPADM

## 2024-02-26 RX ORDER — LISINOPRIL 20 MG/1
20 TABLET ORAL DAILY
Status: DISCONTINUED | OUTPATIENT
Start: 2024-02-27 | End: 2024-02-27

## 2024-02-26 RX ORDER — POTASSIUM CHLORIDE 7.45 MG/ML
10 INJECTION INTRAVENOUS PRN
Status: DISCONTINUED | OUTPATIENT
Start: 2024-02-26 | End: 2024-02-28 | Stop reason: HOSPADM

## 2024-02-26 RX ADMIN — DEXTROSE MONOHYDRATE 150 MG: 50 INJECTION, SOLUTION INTRAVENOUS at 14:58

## 2024-02-26 RX ADMIN — AMIODARONE HYDROCHLORIDE 1 MG/MIN: 50 INJECTION, SOLUTION INTRAVENOUS at 15:12

## 2024-02-26 RX ADMIN — APIXABAN 5 MG: 5 TABLET, FILM COATED ORAL at 21:54

## 2024-02-26 RX ADMIN — SODIUM CHLORIDE, PRESERVATIVE FREE 10 ML: 5 INJECTION INTRAVENOUS at 21:55

## 2024-02-26 ASSESSMENT — ENCOUNTER SYMPTOMS
ABDOMINAL PAIN: 0
CHEST TIGHTNESS: 0
SHORTNESS OF BREATH: 0

## 2024-02-26 ASSESSMENT — PAIN - FUNCTIONAL ASSESSMENT: PAIN_FUNCTIONAL_ASSESSMENT: NONE - DENIES PAIN

## 2024-02-26 NOTE — ED PROVIDER NOTES
mis-transcribed.)    Kg Sanchez DO (electronically signed)  Emergency Attending Physician / Physician Assistant / Nurse Practitioner              Kg Sanchez DO  02/26/24 [id number]

## 2024-02-26 NOTE — H&P
Hospitalist Admission Note      NAME:  Nick Hoff Sr.   :  1945   MRN:  [id number]     Date/Time:  2024 5:39 PM    Patient PCP: Huey Melchor MD    ________________________________________________________________________    Given the patient's current clinical presentation, I have a high level of concern for decompensation if discharged from the emergency department.  Complex decision making was performed, which includes reviewing the patient's available past medical records, laboratory results, and x-ray films.       My assessment of this patient's clinical condition and my plan of care is as follows.    Assessment / Plan:  Patient is a 78-year-old male with a history of A-fib, hypertension, hyperlipidemia, anxiety comes to the hospital with A-fib with RVR.  Patient started on amiodarone gtt.    1.  A-fib with RVR  Patient started on amiodarone gtt.  Cardiology consult in the morning.  Continue Eliquis.  Echo was done in 2019 which showed ejection fraction of 51 to 55%.    2.  Anxiety  Continue Xanax as taking at home.    3.  Hypertension  Continue lisinopril, Toprol-XL.    4.  Hyperlipidemia  Continue Rosuvastatin.          I have personally reviewed the radiographs, laboratory data in Epic and decisions and statements above are based partially on this personal interpretation.    Code Status: Full Code  DVT Prophylaxis:  On Eliquis  GI Prophylaxis: not indicated    I personally spent 35 minutes of critical care time with the patient. Patient is at high risk of decompensation.     Subjective:   CHIEF COMPLAINT: \"Palpitations\"    HISTORY OF PRESENT ILLNESS:     Nick is a very pleasant 78-year-old male came to the ER with chief complaint of palpitations.  Patient has a history of atrial fibrillation and had ablation done almost 1 month back.  Patient is on amiodarone and Eliquis at home.  Patient sees cardiology as an outpatient.  As per the patient he was checking his pulse this morning and  Well positioned.

## 2024-02-26 NOTE — ED NOTES
TRANSFER - OUT REPORT:    Verbal report given to BEVERLEY Irizarry on Nick Hoff Sr.  being transferred to [facility] ED for routine progression of patient care       Report consisted of patient's Situation, Background, Assessment and   Recommendations(SBAR).     Information from the following report(s) Nurse Handoff Report, ED Encounter Summary, and ED SBAR was reviewed with the receiving nurse.    [place] Assessment:    Presents to emergency department  because of falls (Syncope, seizure, or loss of consciousness): No  Age > 70: No  Altered Mental Status, Intoxication with alcohol or substance confusion (Disorientation, impaired judgment, poor safety awaremess, or inability to follow instructions): No  Impaired Mobility: Ambulates or transfers with assistive devices or assistance; Unable to ambulate or transer.: No  Nursing Judgement: No          Lines:   Peripheral IV 02/26/24 Right Antecubital (Active)        Opportunity for questions and clarification was provided.      Patient transported with:  Monitor, IV amiodarone 33.3mL/hr

## 2024-02-26 NOTE — ED TRIAGE NOTES
Pt arrives to ED for suspected afib starting 2-3 hours ago. Reports ablation 1 month ago.     Takes eliquis. Denies CP

## 2024-02-27 PROBLEM — I50.23 ACUTE ON CHRONIC SYSTOLIC CONGESTIVE HEART FAILURE (HCC): Status: ACTIVE | Noted: 2024-02-27

## 2024-02-27 PROBLEM — I48.91 ATRIAL FIBRILLATION WITH RAPID VENTRICULAR RESPONSE (HCC): Status: ACTIVE | Noted: 2024-02-27

## 2024-02-27 LAB
ANION GAP SERPL CALC-SCNC: 0 MMOL/L (ref 5–15)
BASOPHILS # BLD: 0.1 K/UL (ref 0–0.1)
BASOPHILS NFR BLD: 1 % (ref 0–1)
BUN SERPL-MCNC: 18 MG/DL (ref 6–20)
BUN/CREAT SERPL: 17 (ref 12–20)
CALCIUM SERPL-MCNC: 8.9 MG/DL (ref 8.5–10.1)
CHLORIDE SERPL-SCNC: 115 MMOL/L (ref 97–108)
CO2 SERPL-SCNC: 27 MMOL/L (ref 21–32)
CREAT SERPL-MCNC: 1.05 MG/DL (ref 0.7–1.3)
DIFFERENTIAL METHOD BLD: ABNORMAL
EKG DIAGNOSIS: NORMAL
EKG Q-T INTERVAL: 328 MS
EKG Q-T INTERVAL: 404 MS
EKG Q-T INTERVAL: 456 MS
EKG QRS DURATION: 132 MS
EKG QRS DURATION: 136 MS
EKG QRS DURATION: 150 MS
EKG QTC CALCULATION (BAZETT): 505 MS
EKG QTC CALCULATION (BAZETT): 509 MS
EKG QTC CALCULATION (BAZETT): 535 MS
EKG R AXIS: -55 DEGREES
EKG R AXIS: -61 DEGREES
EKG R AXIS: -74 DEGREES
EKG T AXIS: 113 DEGREES
EKG T AXIS: 18 DEGREES
EKG T AXIS: 98 DEGREES
EKG VENTRICULAR RATE: 160 BPM
EKG VENTRICULAR RATE: 75 BPM
EKG VENTRICULAR RATE: 94 BPM
EOSINOPHIL # BLD: 0.2 K/UL (ref 0–0.4)
EOSINOPHIL NFR BLD: 4 % (ref 0–7)
ERYTHROCYTE [DISTWIDTH] IN BLOOD BY AUTOMATED COUNT: 13.3 % (ref 11.5–14.5)
GLUCOSE SERPL-MCNC: 105 MG/DL (ref 65–100)
HCT VFR BLD AUTO: 37.8 % (ref 36.6–50.3)
HGB BLD-MCNC: 12.8 G/DL (ref 12.1–17)
IMM GRANULOCYTES # BLD AUTO: 0 K/UL (ref 0–0.04)
IMM GRANULOCYTES NFR BLD AUTO: 0 % (ref 0–0.5)
LYMPHOCYTES # BLD: 2 K/UL (ref 0.8–3.5)
LYMPHOCYTES NFR BLD: 37 % (ref 12–49)
MCH RBC QN AUTO: 32.1 PG (ref 26–34)
MCHC RBC AUTO-ENTMCNC: 33.9 G/DL (ref 30–36.5)
MCV RBC AUTO: 94.7 FL (ref 80–99)
MONOCYTES # BLD: 0.6 K/UL (ref 0–1)
MONOCYTES NFR BLD: 11 % (ref 5–13)
NEUTS SEG # BLD: 2.5 K/UL (ref 1.8–8)
NEUTS SEG NFR BLD: 47 % (ref 32–75)
NRBC # BLD: 0 K/UL (ref 0–0.01)
NRBC BLD-RTO: 0 PER 100 WBC
PLATELET # BLD AUTO: 159 K/UL (ref 150–400)
PMV BLD AUTO: 9.6 FL (ref 8.9–12.9)
POTASSIUM SERPL-SCNC: 3.8 MMOL/L (ref 3.5–5.1)
RBC # BLD AUTO: 3.99 M/UL (ref 4.1–5.7)
SODIUM SERPL-SCNC: 142 MMOL/L (ref 136–145)
WBC # BLD AUTO: 5.3 K/UL (ref 4.1–11.1)

## 2024-02-27 PROCEDURE — 2580000003 HC RX 258: Performed by: HOSPITALIST

## 2024-02-27 PROCEDURE — 85025 COMPLETE CBC W/AUTO DIFF WBC: CPT

## 2024-02-27 PROCEDURE — 99223 1ST HOSP IP/OBS HIGH 75: CPT | Performed by: INTERNAL MEDICINE

## 2024-02-27 PROCEDURE — 94761 N-INVAS EAR/PLS OXIMETRY MLT: CPT

## 2024-02-27 PROCEDURE — 6370000000 HC RX 637 (ALT 250 FOR IP): Performed by: NURSE PRACTITIONER

## 2024-02-27 PROCEDURE — 2060000000 HC ICU INTERMEDIATE R&B

## 2024-02-27 PROCEDURE — 36415 COLL VENOUS BLD VENIPUNCTURE: CPT

## 2024-02-27 PROCEDURE — 6360000002 HC RX W HCPCS: Performed by: STUDENT IN AN ORGANIZED HEALTH CARE EDUCATION/TRAINING PROGRAM

## 2024-02-27 PROCEDURE — 6370000000 HC RX 637 (ALT 250 FOR IP): Performed by: HOSPITALIST

## 2024-02-27 PROCEDURE — 2580000003 HC RX 258: Performed by: STUDENT IN AN ORGANIZED HEALTH CARE EDUCATION/TRAINING PROGRAM

## 2024-02-27 PROCEDURE — 80048 BASIC METABOLIC PNL TOTAL CA: CPT

## 2024-02-27 PROCEDURE — APPSS30 APP SPLIT SHARED TIME 16-30 MINUTES: Performed by: NURSE PRACTITIONER

## 2024-02-27 RX ORDER — AMIODARONE HYDROCHLORIDE 200 MG/1
200 TABLET ORAL 2 TIMES DAILY
Status: DISCONTINUED | OUTPATIENT
Start: 2024-02-27 | End: 2024-02-28

## 2024-02-27 RX ORDER — LISINOPRIL 5 MG/1
10 TABLET ORAL DAILY
Status: DISCONTINUED | OUTPATIENT
Start: 2024-02-28 | End: 2024-02-28 | Stop reason: HOSPADM

## 2024-02-27 RX ADMIN — AMIODARONE HYDROCHLORIDE 0.5 MG/MIN: 50 INJECTION, SOLUTION INTRAVENOUS at 03:01

## 2024-02-27 RX ADMIN — SODIUM CHLORIDE, PRESERVATIVE FREE 10 ML: 5 INJECTION INTRAVENOUS at 21:03

## 2024-02-27 RX ADMIN — METOPROLOL SUCCINATE 50 MG: 50 TABLET, EXTENDED RELEASE ORAL at 10:02

## 2024-02-27 RX ADMIN — ROSUVASTATIN CALCIUM 10 MG: 10 TABLET, COATED ORAL at 10:02

## 2024-02-27 RX ADMIN — LISINOPRIL 20 MG: 20 TABLET ORAL at 10:02

## 2024-02-27 RX ADMIN — APIXABAN 5 MG: 5 TABLET, FILM COATED ORAL at 10:02

## 2024-02-27 RX ADMIN — APIXABAN 5 MG: 5 TABLET, FILM COATED ORAL at 21:03

## 2024-02-27 RX ADMIN — AMIODARONE HYDROCHLORIDE 200 MG: 200 TABLET ORAL at 21:03

## 2024-02-27 RX ADMIN — SODIUM CHLORIDE, PRESERVATIVE FREE 10 ML: 5 INJECTION INTRAVENOUS at 10:03

## 2024-02-27 RX ADMIN — AMIODARONE HYDROCHLORIDE 200 MG: 200 TABLET ORAL at 10:50

## 2024-02-27 NOTE — ED NOTES
TRANSFER - OUT REPORT:    Verbal report given to BEVERLEY Whyte on Nick Hoff Sr.  being transferred to [facility] for routine progression of patient care       Report consisted of patient's Situation, Background, Assessment and   Recommendations(SBAR).     Information from the following report(s) Nurse Handoff Report, ED SBAR, Intake/Output, MAR, Recent Results, and Cardiac Rhythm 1st degree with PVCs  was reviewed with the receiving nurse.    [place] Assessment:    Presents to emergency department  because of falls (Syncope, seizure, or loss of consciousness): No  Age > 70: No  Altered Mental Status, Intoxication with alcohol or substance confusion (Disorientation, impaired judgment, poor safety awaremess, or inability to follow instructions): No  Impaired Mobility: Ambulates or transfers with assistive devices or assistance; Unable to ambulate or transer.: No  Nursing Judgement: No          Lines:   Peripheral IV 02/26/24 Right Antecubital (Active)        Opportunity for questions and clarification was provided.      Patient transported with:  Monitor, Registered Nurse, and Tech

## 2024-02-27 NOTE — CONSULTS
Attending cardiologist note:    Pt personally seen and examined. Chart reviewed.    Agree with advanced NP's history, exam and  A/P with changes/additons.    Patient is a 78-year-old male with past medical history significant for PAF s/p DCCV; s/p ablation, chronic HFrEF, history of NSVT, history of nonobstructive CAD, s/p mitral valve repair, hypertension, hyperlipidemia, GERD who is admitted for recurrent A-fib with RVR.  History of palpitations.  Has had ablation recently approximately a month ago by Dr. Simpson.  Started on amiodarone gtt.  Rate improved.  Patient states that he has been on amiodarone previously and he was taken off it because of bradycardia.  Patient also states that on recent PREM, his ejection fraction was noted to be around 20 to 25%.  However, previously his echocardiograms have shown that his EF was normal.  His primary cardiologist had scheduled a TTE which she was supposed to get done in the next couple of weeks.    Blood pressure 123/78, pulse 51, temperature 97.9 °F (36.6 °C), temperature source Axillary, resp. rate 18, height 1.803 m (5' 11\"), weight 78.5 kg (173 lb), SpO2 96 %.    Alert  Not in acute distress  Laying comfortably in bed  Neck-no JVD  CVS-S1-S2 present, irregular, 2/6 systolic murmur present  RS-   CTAB     Abdomen- soft/NT  LE-   no edema    A/P :    A-fib with RVR-history of paroxysmal A-fib/A-flutter-s/p ablation 1/16/2024.  History of prolonged QTc.  History of bradycardia for which she was taken off amiodarone previously.    Cardiomyopathy-?  Tachycardia induced.  His PREM during his ablation procedure showed EF of 20 to 25%.  Has had normal EF on previous TTE's.  Check echo    History of VT-status post VT ablation 2019    Status post mitral valve repair    Hypertension    Discussed with patient/nursing    Anton Orr. MD, [facility]      [facility] CARDIOLOGY                    Cardiology Care Note     [x]Initial Encounter     []Follow-up    Patient

## 2024-02-27 NOTE — CARE COORDINATION
Care Management Initial Assessment Note:        02/27/24 0837   Discharge Planning   Patient expects to be discharged to: House   Services At/After Discharge   Transition of Care Consult (CM Consult) N/A   Mode of Transport at Discharge Other (see comment)  (Family)     Patient with low readmission risk score of 5%. No identified CM needs. Please consult CM for any discharge planning needs that may arise.   ______________________  Shae HICKMAN, RN  Care Management  2/27/2024  8:38 AM

## 2024-02-28 ENCOUNTER — APPOINTMENT (OUTPATIENT)
Facility: HOSPITAL | Age: 79
DRG: 309 | End: 2024-02-28
Payer: MEDICARE

## 2024-02-28 VITALS
RESPIRATION RATE: 16 BRPM | BODY MASS INDEX: 24.5 KG/M2 | HEIGHT: 71 IN | OXYGEN SATURATION: 95 % | SYSTOLIC BLOOD PRESSURE: 110 MMHG | WEIGHT: 175 LBS | TEMPERATURE: 97.7 F | DIASTOLIC BLOOD PRESSURE: 86 MMHG | HEART RATE: 52 BPM

## 2024-02-28 LAB
ECHO AO ASC DIAM: 4.7 CM
ECHO AO ASCENDING AORTA INDEX: 2.36 CM/M2
ECHO BSA: 1.99 M2
ECHO LA DIAMETER INDEX: 2.31 CM/M2
ECHO LA DIAMETER: 4.6 CM
ECHO LV EDV A2C: 192 ML
ECHO LV EDV A4C: 152 ML
ECHO LV EDV BP: 168 ML (ref 67–155)
ECHO LV EDV INDEX A4C: 76 ML/M2
ECHO LV EDV INDEX BP: 84 ML/M2
ECHO LV EDV NDEX A2C: 96 ML/M2
ECHO LV EJECTION FRACTION A2C: 45 %
ECHO LV EJECTION FRACTION A4C: 16 %
ECHO LV ESV A2C: 106 ML
ECHO LV ESV A4C: 128 ML
ECHO LV ESV BP: 113 ML (ref 22–58)
ECHO LV ESV INDEX A2C: 53 ML/M2
ECHO LV ESV INDEX A4C: 64 ML/M2
ECHO LV ESV INDEX BP: 57 ML/M2
ECHO LV INTERNAL DIMENSION DIASTOLE INDEX: 3.17 CM/M2
ECHO LV INTERNAL DIMENSION DIASTOLIC: 6.3 CM (ref 4.2–5.9)
ECHO LV INTERNAL DIMENSION SYSTOLIC INDEX: 2.66 CM/M2
ECHO LV INTERNAL DIMENSION SYSTOLIC: 5.3 CM
ECHO LV IVSD: 0.8 CM (ref 0.6–1)
ECHO LV MASS 2D: 202.8 G (ref 88–224)
ECHO LV MASS INDEX 2D: 101.9 G/M2 (ref 49–115)
ECHO LV POSTERIOR WALL DIASTOLIC: 0.8 CM (ref 0.6–1)
ECHO LV RELATIVE WALL THICKNESS RATIO: 0.25
ECHO LVOT AREA: 5.7 CM2
ECHO LVOT DIAM: 2.7 CM

## 2024-02-28 PROCEDURE — 94761 N-INVAS EAR/PLS OXIMETRY MLT: CPT

## 2024-02-28 PROCEDURE — 6370000000 HC RX 637 (ALT 250 FOR IP): Performed by: NURSE PRACTITIONER

## 2024-02-28 PROCEDURE — 2580000003 HC RX 258: Performed by: HOSPITALIST

## 2024-02-28 PROCEDURE — 93308 TTE F-UP OR LMTD: CPT

## 2024-02-28 PROCEDURE — 6370000000 HC RX 637 (ALT 250 FOR IP): Performed by: HOSPITALIST

## 2024-02-28 PROCEDURE — 99232 SBSQ HOSP IP/OBS MODERATE 35: CPT | Performed by: INTERNAL MEDICINE

## 2024-02-28 PROCEDURE — APPSS30 APP SPLIT SHARED TIME 16-30 MINUTES: Performed by: NURSE PRACTITIONER

## 2024-02-28 RX ORDER — AMIODARONE HYDROCHLORIDE 200 MG/1
200 TABLET ORAL DAILY
Status: DISCONTINUED | OUTPATIENT
Start: 2024-02-29 | End: 2024-02-28 | Stop reason: HOSPADM

## 2024-02-28 RX ORDER — AMIODARONE HYDROCHLORIDE 200 MG/1
200 TABLET ORAL DAILY
Qty: 30 TABLET | Refills: 0 | Status: SHIPPED | OUTPATIENT
Start: 2024-02-29

## 2024-02-28 RX ORDER — LISINOPRIL 10 MG/1
10 TABLET ORAL DAILY
Qty: 30 TABLET | Refills: 0 | Status: SHIPPED | OUTPATIENT
Start: 2024-02-29

## 2024-02-28 RX ADMIN — AMIODARONE HYDROCHLORIDE 200 MG: 200 TABLET ORAL at 09:29

## 2024-02-28 RX ADMIN — LISINOPRIL 10 MG: 5 TABLET ORAL at 09:29

## 2024-02-28 RX ADMIN — APIXABAN 5 MG: 5 TABLET, FILM COATED ORAL at 20:36

## 2024-02-28 RX ADMIN — ROSUVASTATIN CALCIUM 10 MG: 10 TABLET, COATED ORAL at 09:29

## 2024-02-28 RX ADMIN — SODIUM CHLORIDE, PRESERVATIVE FREE 10 ML: 5 INJECTION INTRAVENOUS at 09:29

## 2024-02-28 RX ADMIN — METOPROLOL SUCCINATE 50 MG: 50 TABLET, EXTENDED RELEASE ORAL at 09:29

## 2024-02-28 RX ADMIN — APIXABAN 5 MG: 5 TABLET, FILM COATED ORAL at 09:29

## 2024-02-28 NOTE — PROGRESS NOTES
Attending cardiologist note:     Pt personally seen and examined. Chart reviewed.     Agree with advanced NP's history, exam and  A/P with changes/additons.     Patient is a 78-year-old male with past medical history significant for PAF s/p DCCV; s/p ablation, chronic HFrEF, history of NSVT, history of nonobstructive CAD, s/p mitral valve repair, hypertension, hyperlipidemia, GERD who is admitted for recurrent A-fib with RVR.  History of palpitations.  Has had ablation recently approximately a month ago by Dr. Simpson.  Started on amiodarone gtt.  Rate improved.  Patient states that he has been on amiodarone previously and he was taken off it because of bradycardia.  Patient also states that on recent PREM, his ejection fraction was noted to be around 20 to 25%.  However, previously his echocardiograms have shown that his EF was normal.  His primary cardiologist had scheduled a TTE which she was supposed to get done in the next couple of weeks.    No CP/Dyspnea     Blood pressure 118/79, pulse (!) 107, temperature 97.8 °F (36.6 °C), resp. rate 16, height 1.803 m (5' 11\"), weight 79.4 kg (175 lb), SpO2 99 %.       Alert  Not in acute distress  Laying comfortably in bed  Neck-no JVD  CVS-S1-S2 present, irregular, 2/6 systolic murmur present  RS-   CTAB     Abdomen- soft/NT  LE-   no edema    02/26/24    ECHO (TTE) LIMITED (PRN CONTRAST/BUBBLE/STRAIN/3D) 02/28/2024 12:32 PM (Final)    Interpretation Summary    Left Ventricle: Severely reduced left ventricular systolic function with a visually estimated EF of 20 - 25%. Left ventricle is moderately dilated. See diagram for wall motion findings.    Right Ventricle: Not well visualized. Severely reduced systolic function.    Mitral Valve: Valve repaired by annular ring 2012.    Aorta: Dilated ascending aorta. Ao ascending diameter is 4.7 cm.    Image quality is good. Procedure performed with the patient in a supine position.    Signed by: Tony Uriostegui DO on 2/28/2024 
    Patient's HR decreasing to 38 bpm, unsustained while asleep. HR returns to 50's.  Patient awaken easily and VSS, HR increases to 70-80's while awake.  Pacer pads placed on patient.  Patient verbalized having a history of bradycardia while on oral Amiodarone therapy previously.    Plan of care ongoing.     /78   Pulse 61   Temp 98.6 °F (37 °C) (Oral)   Resp 16   Ht 1.803 m (5' 11\")   Wt 79.4 kg (175 lb)   SpO2 98%   BMI 24.41 kg/m²     
  Physician Progress Note      PATIENT:               JACLYN ORDAZ  CSN #:                  [id number]  :                       1945  ADMIT DATE:       2024 2:16 PM  DISCH DATE:  RESPONDING  PROVIDER #:        Nadege Holt MD          QUERY TEXT:    Good Afternoon    This patient admitted with known A-fib    The patient is noted to have history of A-fib s/p ablation on 2024 and   is on Eliquis  Eliquis will be continued this admission.    If possible, please document in progress notes and discharge summary if you   are evaluating and/or treating any of the following:    The medical record reflects the following:  Risk Factors: Male , Age (72)- Chronic CHF, Hx of A-fib s/p ablation, HTN  Clinical Indicators: Per H&P admitted for \"A-fib\" known history of A-fib and   is s/p Ablation on 2024-  Treatment: Eliquis to be continued this admission    Thank you  MARYLOU Fernandes, RN, CPHQ, CCDS,  Options provided:  -- Secondary hypercoagulable state in a patient with atrial fibrillation  -- Other - I will add my own diagnosis  -- Disagree - Not applicable / Not valid  -- Disagree - Clinically unable to determine / Unknown  -- Refer to Clinical Documentation Reviewer    PROVIDER RESPONSE TEXT:    This patient has secondary hypercoagulable state in a patient with atrial   fibrillation.    Query created by: Sarai Rolle on 2024 2:10 PM      Electronically signed by:  Nadege Holt MD 2024 2:14 PM          
0715: Bedside and Verbal shift change report given to Brian RN (oncoming nurse) by Isabell RN (offgoing nurse). Report included the following information Adult Overview, Recent Results, Med Rec Status, and Cardiac Rhythm A.Fib with bout of PVCs causing HR 38 .      0845: messaged cardiology to see if they wanted the full 200 mg of amio to be given due to pt having period of hr at 38 last night. She responded and advised that she did want the full dose administered and that she would speak to him about the meds.     1642: messaged provider advised echo was read and pt wanted to see about discharge thoughts. She advised to walk the patient and see if HR was stable then we could possibly discharge per Radiology.     1823: notified proivder walked to end of banegas and back before walk hr was 64, highest during walk was 76 but did not stay there. Once back in bed HR 68. She advised would put in discharge orders soon.     1915: Bedside and Verbal shift change report given to Agnieszka RN (oncoming nurse) by Brian RN (offgoing nurse). Report included the following information Adult Overview, Recent Results, Med Rec Status, and Cardiac Rhythm SR but Afib most of the day  .    
[facility]  [address] [place] [address] [phone]    [facility]  [facility]ospitalist [facility]                                                                                          Hospitalist Progress Note  Nadege Massey MD        Date of Service:  2024  NAME:  Nick Hoff Sr.  :  1945  MRN:  [id number]      Admission Summary:   77 yo male is admitted with afib rvr    Interval history / Subjective:     No complaints      Assessment & Plan:     Afib RVR  -cont amiodarone gtt  -cont eliquis   -cardiology evaluated, plan to transition to PO amio at higher dose  -cont metoprolol  -repeat echo pending     Anxiety  -cont xanax    HTN  -cont lisinopril, metoprolol     HLD  -cont statin      Outisde Records, prior notes, labs, radiology, and medications reviewed     Code status: full  DVT prophylaxis: [facility] Problems             Last Modified POA    * (Principal) A-fib (HCC) 2024 Yes          Review of Systems:   Pertinent items are noted in HPI.       Vital Signs:    Last 24hrs VS reviewed since prior progress note. Most recent are:  Vitals:    24 0754   BP: 119/79   Pulse: 84   Resp: 19   Temp: 97.5 °F (36.4 °C)   SpO2: 100%         Intake/Output Summary (Last 24 hours) at 2024 0834  Last data filed at 2024 0740  Gross per 24 hour   Intake 334.19 ml   Output 1050 ml   Net -715.81 ml        Physical Examination:             Constitutional:  No acute distress, cooperative, pleasant    ENT:  Oral mucosa moist, oropharynx benign.    Resp:  CTA bilaterally. No wheezing/rhonchi/rales. No accessory muscle use   CV:  Regular rhythm, normal rate, no murmurs, gallops, rubs    GI:  Soft, non distended, non tender. normoactive bowel sounds, no hepatosplenomegaly     Musculoskeletal:  No edema, warm, 2+ pulses throughout    Neurologic:  Moves all extremities.  AAOx3, CN II-XII reviewed     Psych:  Good insight, Not 
[facility]  [address] [place] [address] [phone]    [facility]  Hospitalist [facility]                                                                                          Hospitalist Progress Note  Nadege Massey MD        Date of Service:  2024  NAME:  Nick Hoff Sr.  :  1945  MRN:  [id number]      Admission Summary:   79 yo male is admitted with afib rvr    Interval history / Subjective:     No compliants.      Assessment & Plan:     Afib RVR  -cont PO amio at higher dose, off amio gtt  -cont eliquis   -cardiology evaluated  -cont metoprolol  -repeat echo with EF 20-25%    Anxiety  -cont xanax    HTN  -cont lisinopril, metoprolol     HLD  -cont statin      Outisde Records, prior notes, labs, radiology, and medications reviewed     Code status: full  DVT prophylaxis: [facility] Problems             Last Modified POA    * (Principal) A-fib ([facility]) 2024 Yes    Acute on chronic systolic congestive heart failure ([facility]) 2024 Yes    Atrial fibrillation with rapid ventricular response ([facility]) 2024 Yes          Review of Systems:   Pertinent items are noted in HPI.       Vital Signs:    Last 24hrs VS reviewed since prior progress note. Most recent are:  Vitals:    24 1500   BP: 118/79   Pulse: (!) 107   Resp: 16   Temp: 97.8 °F (36.6 °C)   SpO2: 99%         Intake/Output Summary (Last 24 hours) at 2024 1536  Last data filed at 2024 1511  Gross per 24 hour   Intake --   Output 3900 ml   Net -3900 ml          Physical Examination:             Constitutional:  No acute distress, cooperative, pleasant    ENT:  Oral mucosa moist, oropharynx benign.    Resp:  CTA bilaterally. No wheezing/rhonchi/rales. No accessory muscle use   CV:  Regular rhythm, normal rate, no murmurs, gallops, rubs    GI:  Soft, non distended, non tender. normoactive bowel sounds, no hepatosplenomegaly     Musculoskeletal:  No 
Spiritual Care Assessment/Progress Note  [facility]    Name: Nick Hoff Sr. MRN: [id number]    Age: 78 y.o.     Sex: male   Language: English     Date: 2/27/2024            Total Time Calculated: 20 min              Spiritual Assessment begun in [facility] B3 INTERMEDIATE CARE UNIT  Service Provided For:: Patient  Referral/Consult From:: Clergy/  Encounter Overview/Reason : Rituals, Rites and Sacraments    Spiritual beliefs:      [x] Involved in a anabelle tradition/spiritual practice: [employer]ism     [x] Supported by a anabelle community: [employer]     [] Claims no spiritual orientation:      [] Seeking spiritual identity:           [] Adheres to an individual form of spirituality:      [] Not able to assess:                Identified resources for coping and support system:   Support System: Spouse, Family members, Alevism/anabelle community, Children       [x] Prayer                  [] Devotional reading               [x] Music                  [] Guided Imagery     [] Pet visits                                        [] Other: (COMMENT)     Specific area/focus of visit   Encounter:    Crisis:    Spiritual/Emotional needs: Type: Spiritual Support  Ritual, Rites and Sacraments: Type: [employer]ism Communion  Grief, Loss, and Adjustments:    Ethics/Mediation:    Behavioral Health:    Palliative Care:    Advance Care Planning:      Plan/Referrals: Continue to visit, (comment)    Narrative: Mr. Hoff was sitting up in bed finishing his lunch.  Family visitor with him.  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 and knows a lot people in the dioceses.  Prayer and communion offered to both who were deeply grateful.  Let him know about the Mass schedule at [facility] because Mr. Hoff attends daily Mass during Lent.       Sr. KERRI Pat, RN, ACSW, LCSW   Page:  [phone]    
Fibrillation  -- Permanent Atrial Fibrillation  -- Persistent Atrial Fibrillation  -- Chronic Atrial Fibrillation, unspecified  -- Other - I will add my own diagnosis  -- Disagree - Not applicable / Not valid  -- Disagree - Clinically unable to determine / Unknown  -- Refer to Clinical Documentation Reviewer    PROVIDER RESPONSE TEXT:    This patient has unspecified chronic atrial fibrillation.    Query created by: Sarai Rolle on 2/28/2024 8:04 AM      Electronically signed by:  Nadege Holt MD 2/28/2024 3:36 PM

## 2024-02-28 NOTE — DISCHARGE SUMMARY
[facility]  [address] [place] [address]  [phone]    [facility] Adult  Hospitalist Group     Discharge Summary       PATIENT ID: Nick Hoff Sr.  MRN: [id number]   YOB: 1945    DATE OF ADMISSION: 2/26/2024  2:16 PM    DATE OF DISCHARGE: 02/28/24   PRIMARY CARE PROVIDER: Huey Melchor MD     DISCHARGING PROVIDER: Nadege Massey MD      CONSULTATIONS: IP CONSULT TO CARDIOLOGY    PROCEDURES/SURGERIES: * No surgery found *    ADMITTING DIAGNOSES & HOSPITAL COURSE:   77 yo male is admitted with afib rvr.    Afib RVR  -cont amio po, off amio gtt.   -cont eliquis   -cardiology evaluated  -follow up with patient's own EP on discharge   -cont metoprolol  -repeat echo with EF 20-25%  Anxiety  -cont xanax     HTN  -cont lisinopril, metoprolol      HLD  -cont statin        PENDING TEST RESULTS:   At the time of discharge the following test results are still pending: none    FOLLOW UP APPOINTMENTS:    Guru VIC Simpson MD  [address] [id number] [phone]    Schedule an appointment as soon as possible for a visit in 1 week(s)  Discharge follow up         DIET: cardiac    ACTIVITY: as tolerated       DISCHARGE MEDICATIONS:     Medication List        CHANGE how you take these medications      amiodarone 200 MG tablet  Commonly known as: CORDARONE  Take 1 tablet by mouth daily  Start taking on: February 29, 2024  What changed: how much to take     lisinopril 10 MG tablet  Commonly known as: PRINIVIL;ZESTRIL  Take 1 tablet by mouth daily  Start taking on: February 29, 2024  What changed:   medication strength  how much to take            CONTINUE taking these medications      ALPRAZolam 1 MG tablet  Commonly known as: XANAX     dicyclomine 20 MG tablet  Commonly known as: BENTYL     Eliquis 5 MG Tabs tablet  Generic drug: apixaban     metoprolol succinate 50 MG extended release tablet  Commonly known as: TOPROL

## (undated) DEVICE — ROUND SHAPE BALLOON: Brand: PDB

## (undated) DEVICE — TUBING INSUFLTN 10FT LUER -- CONVERT TO ITEM 368568

## (undated) DEVICE — SYRINGE ANGIO 10ML RED FLAT GRP FIX M LUER CONN MEDALLION

## (undated) DEVICE — PACK PROCEDURE SURG HRT CATH

## (undated) DEVICE — INFECTION CONTROL KIT SYS

## (undated) DEVICE — NEEDLE HYPO 22GA L1.5IN BLK S STL HUB POLYPR SHLD REG BVL

## (undated) DEVICE — PROCEDURE KIT FLUID MGMT CUST MAINFOLD STRL

## (undated) DEVICE — (D)PREP SKN CHLRAPRP APPL 26ML -- CONVERT TO ITEM 371833

## (undated) DEVICE — DRAPE,REIN 53X77,STERILE: Brand: MEDLINE

## (undated) DEVICE — SUTURE MCRYL SZ 4-0 L18IN ABSRB UD L19MM PS-2 3/8 CIR PRIM Y496G

## (undated) DEVICE — TUBING PRSS MON L12IN PVC RIG NONEXPANDING M TO FEM CONN

## (undated) DEVICE — DEVON™ KNEE AND BODY STRAP 60" X 3" (1.5 M X 7.6 CM): Brand: DEVON

## (undated) DEVICE — STERILE POLYISOPRENE POWDER-FREE SURGICAL GLOVES: Brand: PROTEXIS

## (undated) DEVICE — 3M™ TEGADERM™ TRANSPARENT FILM DRESSING FRAME STYLE, 1624W, 2-3/8 IN X 2-3/4 IN (6 CM X 7 CM), 100/CT 4CT/CASE: Brand: 3M™ TEGADERM™

## (undated) DEVICE — TROCAR: Brand: KII SLEEVE

## (undated) DEVICE — GLIDESHEATH SLENDER STAINLESS STEEL KIT: Brand: GLIDESHEATH SLENDER

## (undated) DEVICE — PRESSURE MONITORING SET: Brand: TRUWAVE

## (undated) DEVICE — TR BAND RADIAL ARTERY COMPRESSION DEVICE: Brand: TR BAND

## (undated) DEVICE — SURGICAL PROCEDURE KIT GEN LAPAROSCOPY LF

## (undated) DEVICE — TELFA NON-ADHERENT ABSORBENT DRESSING: Brand: TELFA

## (undated) DEVICE — CLICKLINE SCISSORS INSERT: Brand: CLICKLINE

## (undated) DEVICE — KENDALL SCD EXPRESS SLEEVES, KNEE LENGTH, MEDIUM: Brand: KENDALL SCD

## (undated) DEVICE — REM POLYHESIVE ADULT PATIENT RETURN ELECTRODE: Brand: VALLEYLAB

## (undated) DEVICE — SYR 10ML LUER LOK 1/5ML GRAD --

## (undated) DEVICE — SUTURE SZ 0 27IN 5/8 CIR UR-6  TAPER PT VIOLET ABSRB VICRYL J603H

## (undated) DEVICE — TRAY CATH OD16FR SIL URIN M STATLOK STBL DEV SURSTP

## (undated) DEVICE — ANGIOGRAPHIC CATHETER: Brand: IMPULSE™

## (undated) DEVICE — SOL IRRIGATION INJ NACL 0.9% 500ML BTL

## (undated) DEVICE — LIGHT HANDLE: Brand: DEVON

## (undated) DEVICE — STRIP,CLOSURE,WOUND,MEDI-STRIP,1/2X4: Brand: MEDLINE

## (undated) DEVICE — TROCAR: Brand: KII FIOS FIRST ENTRY